# Patient Record
Sex: FEMALE | Race: WHITE | Employment: OTHER | URBAN - METROPOLITAN AREA
[De-identification: names, ages, dates, MRNs, and addresses within clinical notes are randomized per-mention and may not be internally consistent; named-entity substitution may affect disease eponyms.]

---

## 2017-02-02 RX ORDER — CLOPIDOGREL BISULFATE 75 MG/1
TABLET ORAL
Qty: 60 TAB | Refills: 0 | Status: SHIPPED | OUTPATIENT
Start: 2017-02-02 | End: 2017-04-01 | Stop reason: SDUPTHER

## 2017-03-02 RX ORDER — ISOSORBIDE MONONITRATE 60 MG/1
TABLET, EXTENDED RELEASE ORAL
Qty: 60 TAB | Refills: 0 | Status: SHIPPED | OUTPATIENT
Start: 2017-03-02 | End: 2017-05-08 | Stop reason: SDUPTHER

## 2017-03-06 ENCOUNTER — OFFICE VISIT (OUTPATIENT)
Dept: CARDIOLOGY CLINIC | Age: 67
End: 2017-03-06

## 2017-03-06 VITALS
BODY MASS INDEX: 24.17 KG/M2 | HEIGHT: 67 IN | HEART RATE: 74 BPM | WEIGHT: 154 LBS | SYSTOLIC BLOOD PRESSURE: 132 MMHG | RESPIRATION RATE: 16 BRPM | OXYGEN SATURATION: 95 % | DIASTOLIC BLOOD PRESSURE: 72 MMHG

## 2017-03-06 DIAGNOSIS — I25.119 ATHEROSCLEROSIS OF NATIVE CORONARY ARTERY OF NATIVE HEART WITH ANGINA PECTORIS (HCC): Primary | ICD-10-CM

## 2017-03-06 DIAGNOSIS — E10.8 TYPE 1 DIABETES MELLITUS WITH COMPLICATION (HCC): ICD-10-CM

## 2017-03-06 DIAGNOSIS — E03.9 HYPOTHYROIDISM, UNSPECIFIED TYPE: Chronic | ICD-10-CM

## 2017-03-06 DIAGNOSIS — Z95.5 S/P CORONARY ARTERY STENT PLACEMENT: ICD-10-CM

## 2017-03-06 DIAGNOSIS — Z98.62 PERIPHERAL VASCULAR ANGIOPLASTY STATUS: ICD-10-CM

## 2017-03-06 DIAGNOSIS — I10 ESSENTIAL HYPERTENSION: Chronic | ICD-10-CM

## 2017-03-06 DIAGNOSIS — I73.9 PAD (PERIPHERAL ARTERY DISEASE) (HCC): ICD-10-CM

## 2017-03-06 DIAGNOSIS — E78.2 MIXED HYPERLIPIDEMIA: Chronic | ICD-10-CM

## 2017-03-06 RX ORDER — NITROGLYCERIN 0.4 MG/1
0.4 TABLET SUBLINGUAL
Qty: 25 TAB | Refills: 1 | Status: SHIPPED | OUTPATIENT
Start: 2017-03-06

## 2017-03-06 RX ORDER — LOVASTATIN 20 MG/1
20 TABLET ORAL
COMMUNITY
End: 2019-04-08 | Stop reason: ALTCHOICE

## 2017-03-06 NOTE — MR AVS SNAPSHOT
Visit Information Date & Time Provider Department Dept. Phone Encounter #  
 3/6/2017 10:00 AM Di Jain, [address] [facility] [phone] [id number] Follow-up Instructions Return in about 6 months (around 9/6/2017). Upcoming Health Maintenance Date Due Hepatitis C Screening 1950 FOOT EXAM Q1 7/29/1961 EYE EXAM RETINAL OR DILATED Q1 7/29/1961 DTaP/Tdap/Td series (1 - Tdap) 7/29/1972 BREAST CANCER SCRN MAMMOGRAM 7/29/2001 FOBT Q 1 YEAR AGE 50-75 7/29/2001 MICROALBUMIN Q1 6/2/2010 ZOSTER VACCINE AGE 60> 7/29/2011 GLAUCOMA SCREENING Q2Y 7/29/2016 OSTEOPOROSIS SCREENING (DEXA) 7/29/2016 Pneumococcal 65+ Low/Medium Risk (1 of 2 - PCV13) 7/29/2016 MEDICARE YEARLY EXAM 7/29/2016 INFLUENZA AGE 9 TO ADULT 8/1/2016 HEMOGLOBIN A1C Q6M 8/29/2016 LIPID PANEL Q1 2/28/2017 Allergies as of 3/6/2017  Review Complete On: 3/6/2017 By: Jefferson Stewart NP Severity Noted Reaction Type Reactions Pcn [Penicillins] High 05/06/2010   Systemic Anaphylaxis Codeine  05/06/2010    Palpitations Ivp Dye [Fd And C Blue No.1]  05/06/2010    Nausea and Vomiting \"I was told [place] that it would stop my kidney up if I got it again. \" Current Immunizations  Reviewed on 6/11/2014 Name Date Pneumococcal Vaccine (Unspecified Type)  Deferred (Patient Refused) Not reviewed this visit You Were Diagnosed With   
  
 Codes Comments Atherosclerosis of native coronary artery of native heart with angina pectoris ([facility]    -  Primary ICD-10-CM: I25.119 ICD-9-CM: 414.01, 413.9 PAD (peripheral artery disease) (HCC)     ICD-10-CM: I73.9 ICD-9-CM: 443.9 Essential hypertension     ICD-10-CM: I10 
ICD-9-CM: 401.9 Mixed hyperlipidemia     ICD-10-CM: E78.2 ICD-9-CM: 272.2 Hypothyroidism, unspecified type     ICD-10-CM: E03.9 ICD-9-CM: 244.9 S/P coronary artery stent placement     ICD-10-CM: Z95.5 ICD-9-CM: V45.82 Peripheral vascular angioplasty status     ICD-10-CM: Z98.62 
ICD-9-CM: V45.89 Type 1 diabetes mellitus with complication (HCC)     RLY-78-AW: E10.8 ICD-9-CM: 250.91 Vitals BP Pulse Resp Height(growth percentile) Weight(growth percentile) SpO2  
 132/72 (BP 1 Location: Left arm, BP Patient Position: Sitting) 74 16 5' 7\" (1.702 m) 154 lb (69.9 kg) 95% BMI OB Status Smoking Status 24.12 kg/m2 Hysterectomy Former Smoker Vitals History BMI and BSA Data Body Mass Index Body Surface Area  
 24.12 kg/m 2 1.82 m 2 Preferred Pharmacy Pharmacy Name Phone [facility] [address], [address] [place] [address] Avenue [phone] Your Updated Medication List  
  
   
This list is accurate as of: 3/6/17 10:28 AM.  Always use your most recent med list.  
  
  
  
  
 aspirin 325 mg tablet Commonly known as:  ASPIRIN Take 1 Tab by mouth daily. calcium 500 mg Tab Take 1 Tab by mouth daily. CINNAMON PO Take 1,000 mg by mouth daily. clopidogrel 75 mg Tab Commonly known as:  PLAVIX TAKE ONE TABLET BY MOUTH ONCE DAILY HUMALOG SC  
6 Units by SubCUTAneous route three (3) times daily (with meals). * insulin  unit/mL (3 mL) Inpn Commonly known as:  HUMULIN N  
12 Units by SubCUTAneous route daily (with breakfast). * insulin  unit/mL (3 mL) Inpn Commonly known as:  HUMULIN N  
6 Units by SubCUTAneous route as needed (as needed with dinner). isosorbide mononitrate ER 60 mg CR tablet Commonly known as:  IMDUR  
TAKE ONE TABLET BY MOUTH ONCE DAILY  
  
 levothyroxine 50 mcg tablet Commonly known as:  SYNTHROID Take 50 mcg by mouth daily (before breakfast). lisinopril 40 mg tablet Commonly known as:  Corey Damon Take 1 tablet by mouth daily. lovastatin 20 mg tablet Commonly known as:  MEVACOR Take 20 mg by mouth nightly. magnesium oxide 400 mg tablet Commonly known as:  MAG-OX Take 400 mg by mouth daily. metoprolol tartrate 25 mg tablet Commonly known as:  LOPRESSOR  
TAKE ONE-HALF TABLET BY MOUTH TWICE DAILY  
  
 nitroglycerin 0.4 mg SL tablet Commonly known as:  NITROSTAT  
1 Tab by SubLINGual route every five (5) minutes as needed for Chest Pain. oxybutynin 5 mg tablet Commonly known as:  BLQKTNKF Take 5 mg by mouth daily. VITAMIN D3 4,000 unit Cap Generic drug:  cholecalciferol (vitamin D3) Take  by mouth daily. * Notice: This list has 2 medication(s) that are the same as other medications prescribed for you. Read the directions carefully, and ask your doctor or other care provider to review them with you. Prescriptions Sent to Pharmacy Refills  
 nitroglycerin (NITROSTAT) 0.4 mg SL tablet 1 Si Tab by SubLINGual route every five (5) minutes as needed for Chest Pain. Class: Normal  
 Pharmacy: [address] [place], [address] [place] [address] Ph #: [phone] Route: SubLINGual  
  
We Performed the Following AMB POC EKG ROUTINE W/ 12 LEADS, INTER & REP [62902 CPT(R)] Follow-up Instructions Return in about 6 months (around 2017). Introducing \[facility]\"" & [facility]! Dear Ivory Meier: Thank you for requesting a P[employer] account. Our records indicate that you already have an active Paxera account. You can access your account anytime at [email or web address] Did you know that you can access your hospital and ER discharge instructions at any time in Paxera? You can also review all of your test results from your hospital stay or ER visit. Additional Information If you have questions, please visit the Frequently Asked Questions section of the P[employer]era website at [email or web address] Remember, Paxera is NOT to be used for urgent needs. For medical emergencies, dial 911. Now available from your iPhone and Android! Please provide this summary of care documentation to your next provider. Your primary care clinician is listed as Aixa Shaw. If you have any questions after today's visit, please call [phone].

## 2017-03-06 NOTE — PROGRESS NOTES
Subjective/HPI:     Brandy Mcrae is a 73 y.o. female is here for f/u appt. She reports she went to [facility] this December and was able to walk around the park without having to stop d/t leg pain. Very pleased. She is having during stressful situations some discomfort in her chest, feels like she needs to belch but doesn't. She has not noticed it at other times. Denies similarity to symptoms prior to her  procedure. The patient denies shortness of breath, orthopnea, PND, LE edema, palpitations, syncope, presyncope or fatigue. PCP Provider  Francisco Willis MD  Past Medical History:   Diagnosis Date    Arthritis     BILAT HANDS    CAD (coronary artery disease)     high cholesterol; heart murmur    Cancer (HCC)     uterine    Chronic kidney disease     Diabetes ([facility]     Endocrine disease     hypothyroidism    Hypertension     Neurological disorder     TIA    Other ill-defined conditions(799.89)     PVD; benign right breast tumor removed    Peripheral vascular angioplasty status 8/14/2013 8/14/13 s/p angioplasty L RFA    Stroke ([facility]     TIA  X3 NO RESIDUAL    Thyroid disease     HYPOTHYROID      Past Surgical History:   Procedure Laterality Date    BREAST SURGERY PROCEDURE UNLISTED      tumor removed    CARDIAC SURG PROCEDURE UNLIST      CARDIAC STENT  X2    HX ANKLE FRACTURE TX      pins and plates right ankle    HX GYN      hysterectomy    HX OTHER SURGICAL      fem pop bypass on left     Allergies   Allergen Reactions    Pcn [Penicillins] Anaphylaxis    Codeine Palpitations    Ivp Dye [Fd And C Blue No.1] Nausea and Vomiting     \"I was told in [place] that it would stop my kidney up if I got it again. \"      Family History   Problem Relation Age of Onset   [facility] Mother     Hypertension Sister     Hypertension Sister     Heart Disease Brother 72    Stroke Sister      x2 sisters    Diabetes Father     Cancer Father       Current Outpatient Prescriptions Medication Sig    lovastatin (MEVACOR) 20 mg tablet Take 20 mg by mouth nightly.  isosorbide mononitrate ER (IMDUR) 60 mg CR tablet TAKE ONE TABLET BY MOUTH ONCE DAILY    clopidogrel (PLAVIX) 75 mg tab TAKE ONE TABLET BY MOUTH ONCE DAILY    metoprolol tartrate (LOPRESSOR) 25 mg tablet TAKE ONE-HALF TABLET BY MOUTH TWICE DAILY    magnesium oxide (MAG-OX) 400 mg tablet Take 400 mg by mouth daily.  calcium 500 mg tab Take 1 Tab by mouth daily.  nitroglycerin (NITROSTAT) 0.4 mg SL tablet 1 Tab by SubLINGual route every five (5) minutes as needed for Chest Pain.  oxybutynin (DITROPAN) 5 mg tablet Take 5 mg by mouth daily.  lisinopril (PRINIVIL, ZESTRIL) 40 mg tablet Take 1 tablet by mouth daily. (Patient taking differently: Take 25 mg by mouth daily.)    insulin NPH (HUMULIN N) 100 unit/mL (3 mL) pen 12 Units by SubCUTAneous route daily (with breakfast).  insulin NPH (HUMULIN N) 100 unit/mL (3 mL) pen 6 Units by SubCUTAneous route as needed (as needed with dinner).  cholecalciferol, vitamin D3, (VITAMIN D3) 4,000 unit cap Take  by mouth daily.  aspirin (ASPIRIN) 325 mg tablet Take 1 Tab by mouth daily.  levothyroxine (SYNTHROID) 50 mcg tablet Take 50 mcg by mouth daily (before breakfast).  INSULIN LISPRO (HUMALOG SC) 6 Units by SubCUTAneous route three (3) times daily (with meals).  CINNAMON BARK (CINNAMON PO) Take 1,000 mg by mouth daily. No current facility-administered medications for this visit.        Vitals:    03/06/17 0956 03/06/17 1008   BP: 130/74 132/72   Pulse: 74    Resp: 16    SpO2: 95%    Weight: 154 lb (69.9 kg)    Height: 5' 7\" (1.702 m)      Social History     Social History    Marital status:      Spouse name: N/A    Number of children: 11    Years of education: N/A     Occupational History     Not Employed     Social History Main Topics    Smoking status: Former Smoker     Packs/day: 5.00     Years: 30.00     Quit date: 9/1/2000    Smokeless tobacco: Never Used    Alcohol use No    Drug use: No    Sexual activity: Not on file     Other Topics Concern    Not on file     Social History Narrative       I have reviewed the nurses notes, vitals, problem list, allergy list, medical history, family, social history and medications. Review of Symptoms:    General: Pt denies excessive weight gain or loss. Pt is able to conduct ADL's  HEENT: Denies blurred vision, headaches, epistaxis and difficulty swallowing. Respiratory: Denies shortness of breath, SAHNI, wheezing or stridor. Cardiovascular: + precordial pain, denies palpitations, edema or PND  Gastrointestinal: Denies poor appetite, indigestion, abdominal pain or blood in stool  Urinary: Denies dysuria, pyuria  Musculoskeletal: Denies pain or swelling from muscles or joints  Neurologic: Denies tremor, paresthesias, or sensory motor disturbance  Skin: Denies rash, itching or texture change. Psych: Denies depression        Physical Exam:      General: Well developed, in no acute distress, cooperative and alert  HEENT: +left carotid bruits, no JVD, trach is midline. Neck Supple, PEERL, EOM intact. Heart:  Normal S1/S2 negative S3 or S4. Regular, no murmur, gallop or rub.   Respiratory: Clear bilaterally x 4, no wheezing or rales  Abdomen:   Soft, non-tender, no masses, bowel sounds are active.   Extremities:  No edema, normal cap refill, no cyanosis, atraumatic. Neuro: A&Ox3, speech clear, gait stable. Skin: Skin color is normal. No rashes or lesions. Non diaphoretic  Vascular: weak pulse in feet.      Cardiographics    ECG: SR    Results for orders placed or performed during the hospital encounter of 09/19/16   EKG, 12 LEAD, INITIAL   Result Value Ref Range    Ventricular Rate 59 BPM    Atrial Rate 59 BPM    P-R Interval 186 ms    QRS Duration 98 ms    Q-T Interval 436 ms    QTC Calculation (Bezet) 431 ms    Calculated P Axis 72 degrees    Calculated R Axis 44 degrees    Calculated T Axis 70 degrees Diagnosis       Sinus bradycardia  Minor nonspecific st&t changes , new  When compared with ECG of 19-SEP-2016 11:05,    Confirmed by Gurpreet Stevenson MD, Sonam Ghosh ([id number]) on 9/20/2016 4:48:21 PM           Cardiology Labs:  Lab Results   Component Value Date/Time    Cholesterol, total 172 02/29/2016 09:32 AM    HDL Cholesterol 74 02/29/2016 09:32 AM    LDL, calculated 83 02/29/2016 09:32 AM    Triglyceride 76 02/29/2016 09:32 AM    CHOL/HDL Ratio 1.9 09/07/2010 10:21 AM       Lab Results   Component Value Date/Time    Sodium 138 09/20/2016 04:41 AM    Potassium 4.0 09/20/2016 04:41 AM    Chloride 104 09/20/2016 04:41 AM    CO2 26 09/20/2016 04:41 AM    Anion gap 8 09/20/2016 04:41 AM    Glucose 216 09/20/2016 04:41 AM    BUN 21 09/20/2016 04:41 AM    Creatinine 1.06 09/20/2016 04:41 AM    BUN/Creatinine ratio 20 09/20/2016 04:41 AM    GFR est AA >60 09/20/2016 04:41 AM    GFR est non-AA 52 09/20/2016 04:41 AM    Calcium 8.5 09/20/2016 04:41 AM    AST (SGOT) 19 09/13/2016 09:52 AM    Alk. phosphatase 70 09/13/2016 09:52 AM    Protein, total 6.4 09/13/2016 09:52 AM    Albumin 4.1 09/13/2016 09:52 AM    Globulin 3.5 08/14/2013 08:44 AM    A-G Ratio 1.8 09/13/2016 09:52 AM    ALT (SGPT) 9 09/13/2016 09:52 AM           Assessment:     Assessment:     Carie Clayton was seen today for other. Diagnoses and all orders for this visit:    Atherosclerosis of native coronary artery of native heart with angina pectoris (HCC)    PAD (peripheral artery disease) ([facility]    Essential hypertension  -     AMB POC EKG ROUTINE W/ 12 LEADS, INTER & REP    Mixed hyperlipidemia    Hypothyroidism, unspecified type    S/P coronary artery stent placement    Peripheral vascular angioplasty status    Type 1 diabetes mellitus with complication ([facility]        ICD-10-CM ICD-9-CM    1. Atherosclerosis of native coronary artery of native heart with angina pectoris ([facility] I25.119 414.01      413.9    2. PAD (peripheral artery disease) (HCC) I73.9 443.9    3.  Essential hypertension I10 401.9 AMB POC EKG ROUTINE W/ 12 LEADS, INTER & REP   4. Mixed hyperlipidemia E78.2 272.2    5. Hypothyroidism, unspecified type E03.9 244.9    6. S/P coronary artery stent placement Z95.5 V45.82    7. Peripheral vascular angioplasty status Z98.62 V45.89    8. Type 1 diabetes mellitus with complication (HCC) B76.5 250.91      Orders Placed This Encounter    AMB POC EKG ROUTINE W/ 12 LEADS, INTER & REP     Order Specific Question:   Reason for Exam:     Answer:   routine    lovastatin (MEVACOR) 20 mg tablet     Sig: Take 20 mg by mouth nightly. Plan:     CAD s/p LCX ARCHANA 1/2012. S/p re intervention 10/2012. S/p RCA  PCI 09/2016. Pt reporting atypical chest discomfort during stress only, denies similarity to symptoms prior to stent placement. Should intensity change or being to feel similar to previous symptoms call to discuss.       ATHEROSCLEROSIS OF NATIVE ARTERIES OF THE EXTREMITIES WITH INTERMITTENT CLAUDICATION    S/p b/l iliac stents 7/2014.  s/p left SFA laser athetectomy and stent 9/2011, s/p left SFA angioplasty for ISR 1/2012. Significant stenosis Rt. SFA.- S/p left SFA redo laser atherectomy and PTA 10/2012. S/p left SFA angioplasty 8/2013- Cinically asymptomatic. No c/o brain  LE claudication. Continue current tx      HYPERLIPIDEMIA, MIXED    On statin. F/u with Endocrinology.        BENIGN ESSENTIAL HYPERTENSION    Controlled. Carotid bruit- asymptomatic. Carotid dopplers in 2011 without significant disease    Brandt Poster, NP       Pt seen and examined in details. Agree with NP A&P. D/w pt.      Nat Lopez MD

## 2017-03-06 NOTE — PROGRESS NOTES
Chief Complaint   Patient presents with    Other     6 month-chest pain, denies any cardiac symptoms

## 2017-04-03 RX ORDER — CLOPIDOGREL BISULFATE 75 MG/1
TABLET ORAL
Qty: 60 TAB | Refills: 0 | Status: SHIPPED | OUTPATIENT
Start: 2017-04-03 | End: 2017-06-05 | Stop reason: SDUPTHER

## 2017-04-13 ENCOUNTER — TELEPHONE (OUTPATIENT)
Dept: CARDIOLOGY CLINIC | Age: 67
End: 2017-04-13

## 2017-04-13 NOTE — TELEPHONE ENCOUNTER
Pt 's  left message on phone wanting to know if it is okay for pt to take the diet pill Hydroxycut black. Please advise, thank you! Left message to call me back. Left 2nd message to call me back. Left 3rd message to call me back. CALLED FOR 4TH TIME WITH NO ANSWER. DID NOT LEAVE A MESSAGE. WILL CLOSE OUT CHART. IF PT CONTACTS ME WILL TELL HER AT THAT TIME IT IS NOT ADVISED THAT SHE TAKE HYDROXYCUT.

## 2017-04-20 ENCOUNTER — TELEPHONE (OUTPATIENT)
Dept: CARDIOLOGY CLINIC | Age: 67
End: 2017-04-20

## 2017-04-20 NOTE — TELEPHONE ENCOUNTER
Pt returned my call,verified pt with two pt identifiers, told pt it is it is  advised that she not take Hydroxycut given her Hx of heart issues. Advised that she diet and exercise naturally. She verbalized that she understood everything.

## 2017-05-08 RX ORDER — ISOSORBIDE MONONITRATE 60 MG/1
TABLET, EXTENDED RELEASE ORAL
Qty: 60 TAB | Refills: 0 | Status: SHIPPED | OUTPATIENT
Start: 2017-05-08 | End: 2017-07-04 | Stop reason: SDUPTHER

## 2017-06-05 RX ORDER — CLOPIDOGREL BISULFATE 75 MG/1
TABLET ORAL
Qty: 60 TAB | Refills: 0 | Status: SHIPPED | OUTPATIENT
Start: 2017-06-05 | End: 2017-07-17 | Stop reason: SDUPTHER

## 2017-07-05 RX ORDER — ISOSORBIDE MONONITRATE 60 MG/1
TABLET, EXTENDED RELEASE ORAL
Qty: 60 TAB | Refills: 0 | Status: SHIPPED | OUTPATIENT
Start: 2017-07-05 | End: 2017-09-05 | Stop reason: SDUPTHER

## 2017-07-17 RX ORDER — CLOPIDOGREL BISULFATE 75 MG/1
TABLET ORAL
Qty: 60 TAB | Refills: 0 | Status: SHIPPED | OUTPATIENT
Start: 2017-07-17 | End: 2017-09-30 | Stop reason: SDUPTHER

## 2017-09-05 ENCOUNTER — OFFICE VISIT (OUTPATIENT)
Dept: CARDIOLOGY CLINIC | Age: 67
End: 2017-09-05

## 2017-09-05 VITALS
HEART RATE: 64 BPM | SYSTOLIC BLOOD PRESSURE: 150 MMHG | OXYGEN SATURATION: 95 % | RESPIRATION RATE: 16 BRPM | BODY MASS INDEX: 23.89 KG/M2 | DIASTOLIC BLOOD PRESSURE: 70 MMHG | WEIGHT: 152.2 LBS | HEIGHT: 67 IN

## 2017-09-05 DIAGNOSIS — E78.2 MIXED HYPERLIPIDEMIA: Chronic | ICD-10-CM

## 2017-09-05 DIAGNOSIS — I10 ESSENTIAL HYPERTENSION: Chronic | ICD-10-CM

## 2017-09-05 DIAGNOSIS — Z98.62 PERIPHERAL VASCULAR ANGIOPLASTY STATUS: ICD-10-CM

## 2017-09-05 DIAGNOSIS — I73.9 PAD (PERIPHERAL ARTERY DISEASE) (HCC): Primary | ICD-10-CM

## 2017-09-05 DIAGNOSIS — I25.10 ATHEROSCLEROSIS OF NATIVE CORONARY ARTERY OF NATIVE HEART WITHOUT ANGINA PECTORIS: ICD-10-CM

## 2017-09-05 DIAGNOSIS — I70.213 ATHEROSCLEROSIS OF NATIVE ARTERY OF BOTH LOWER EXTREMITIES WITH INTERMITTENT CLAUDICATION (HCC): ICD-10-CM

## 2017-09-05 RX ORDER — ISOSORBIDE MONONITRATE 60 MG/1
TABLET, EXTENDED RELEASE ORAL
Qty: 60 TAB | Refills: 3 | Status: SHIPPED | OUTPATIENT
Start: 2017-09-05 | End: 2019-10-08 | Stop reason: SDUPTHER

## 2017-09-05 NOTE — MR AVS SNAPSHOT
Visit Information Date & Time Provider Department Dept. Phone Encounter #  
 9/5/2017  9:45 AM Jessica Bledsoe, [address] [facility] [phone] [id number] Follow-up Instructions Return in about 6 months (around 3/5/2018). Follow-up and Disposition History Upcoming Health Maintenance Date Due Hepatitis C Screening 1950 FOOT EXAM Q1 7/29/1961 EYE EXAM RETINAL OR DILATED Q1 7/29/1961 DTaP/Tdap/Td series (1 - Tdap) 7/29/1972 BREAST CANCER SCRN MAMMOGRAM 7/29/2001 FOBT Q 1 YEAR AGE 50-75 7/29/2001 MICROALBUMIN Q1 6/2/2010 ZOSTER VACCINE AGE 60> 5/29/2011 GLAUCOMA SCREENING Q2Y 7/29/2016 OSTEOPOROSIS SCREENING (DEXA) 7/29/2016 Pneumococcal 65+ Low/Medium Risk (1 of 2 - PCV13) 7/29/2016 MEDICARE YEARLY EXAM 7/29/2016 HEMOGLOBIN A1C Q6M 8/29/2016 LIPID PANEL Q1 2/28/2017 INFLUENZA AGE 9 TO ADULT 8/1/2017 Allergies as of 9/5/2017  Review Complete On: 9/5/2017 By: Jessica Bledsoe MD  
  
 Severity Noted Reaction Type Reactions Pcn [Penicillins] High 05/06/2010   Systemic Anaphylaxis Codeine  05/06/2010    Palpitations Ivp Dye [Fd And C Blue No.1]  05/06/2010    Nausea and Vomiting \"I was told [place] that it would stop my kidney up if I got it again. \" Current Immunizations  Reviewed on 6/11/2014 Name Date ZZZ-RETIRED (DO NOT USE) Pneumococcal Vaccine (Unspecified Type)  Deferred (Patient Refused) Not reviewed this visit You Were Diagnosed With   
  
 Codes Comments PAD (peripheral artery disease) (HCC)    -  Primary ICD-10-CM: I73.9 ICD-9-CM: 443.9 Essential hypertension     ICD-10-CM: I10 
ICD-9-CM: 401.9 Atherosclerosis of native artery of both lower extremities with intermittent claudication ([facility]     ICD-10-CM: P86.901 ICD-9-CM: 440.21 Mixed hyperlipidemia     ICD-10-CM: E78.2 ICD-9-CM: 272.2 Peripheral vascular angioplasty status     ICD-10-CM: Z98.62 
ICD-9-CM: V45.89 Atherosclerosis of native coronary artery of native heart without angina pectoris     ICD-10-CM: I25.10 ICD-9-CM: 414.01 Vitals BP Pulse Resp Height(growth percentile) Weight(growth percentile) SpO2  
 150/70 (BP Patient Position: Sitting) 64 16 5' 7\" (1.702 m) 152 lb 3.2 oz (69 kg) 95% BMI OB Status Smoking Status 23.84 kg/m2 Hysterectomy Former Smoker BMI and BSA Data Body Mass Index Body Surface Area  
 23.84 kg/m 2 1.81 m 2 Preferred Pharmacy Pharmacy Name Phone [facility] [address] [phone] Your Updated Medication List  
  
   
This list is accurate as of: 9/5/17 10:24 AM.  Always use your most recent med list.  
  
  
  
  
 calcium 500 mg Tab Take 1 Tab by mouth daily. CINNAMON PO Take 1,000 mg by mouth daily. clopidogrel 75 mg Tab Commonly known as:  PLAVIX TAKE ONE TABLET BY MOUTH ONCE DAILY HUMALOG SC  
6 Units by SubCUTAneous route three (3) times daily (with meals). insulin  unit/mL (3 mL) Inpn Commonly known as:  HUMULIN N  
by SubCUTAneous route as needed (as needed with dinner). Sliding scale  
  
 isosorbide mononitrate ER 60 mg CR tablet Commonly known as:  IMDUR  
TAKE ONE TABLET BY MOUTH ONCE DAILY  
  
 levothyroxine 50 mcg tablet Commonly known as:  SYNTHROID Take 50 mcg by mouth daily (before breakfast). lisinopril 40 mg tablet Commonly known as:  Karn Desanctis Take 1 tablet by mouth daily. lovastatin 20 mg tablet Commonly known as:  MEVACOR Take 20 mg by mouth nightly.  
  
 magnesium oxide 400 mg tablet Commonly known as:  MAG-OX Take 400 mg by mouth daily. metoprolol tartrate 25 mg tablet Commonly known as:  LOPRESSOR  
TAKE ONE-HALF TABLET BY MOUTH TWICE DAILY  
  
 nitroglycerin 0.4 mg SL tablet Commonly known as:  NITROSTAT  
1 Tab by SubLINGual route every five (5) minutes as needed for Chest Pain. oxybutynin 5 mg tablet Commonly known as:  HVFIWATW Take 5 mg by mouth daily. VITAMIN D3 4,000 unit Cap Generic drug:  cholecalciferol (vitamin D3) Take  by mouth daily. Prescriptions Sent to Pharmacy Refills  
 isosorbide mononitrate ER (IMDUR) 60 mg CR tablet 3 Sig: TAKE ONE TABLET BY MOUTH ONCE DAILY Class: Normal  
 Pharmacy: [address] #: [phone] We Performed the Following AMB POC EKG ROUTINE W/ 12 LEADS, INTER & REP [88323 CPT(R)] Follow-up Instructions Return in about 6 months (around 3/5/2018). To-Do List   
 09/05/2017 Imaging:  ANKLE BRACHIAL INDEX Introducing [facility] SERVICES! Dear Romy Escoto: Thank you for requesting a [employer]Â­ account. Our records indicate that you already have an active [employer]Â­ account. You can access your account anytime at [email or web address] Did you know that you can access your hospital and ER discharge instructions at any time in [employer]? You can also review all of your test results from your hospital stay or ER visit. Additional Information If you have questions, please visit the Frequently Asked Questions section of the [employer] website at [email or web address] Remember, [employer] is NOT to be used for urgent needs. For medical emergencies, dial 911. Now available from your iPhone and Android! Please provide this summary of care documentation to your next provider. Your primary care clinician is listed as Vijaya Be. If you have any questions after today's visit, please call [phone].

## 2017-09-05 NOTE — PROGRESS NOTES
9/5/2017 10:11 AM      Subjective:     Omar Suárez is here for f/u visit. Main complaint is right LE claudication after walking < 1 block. She denies chest pain, chest pressure/discomfort, dyspnea, palpitations, irregular heart beats, near-syncope, syncope, fatigue, orthopnea, paroxysmal nocturnal dyspnea, exertional chest pressure/discomfort, lower extremity edema. Visit Vitals    /70 (BP Patient Position: Sitting)  Comment: lt/rt/lg    Pulse 64    Resp 16    Ht 5' 7\" (1.702 m)    Wt 152 lb 3.2 oz (69 kg)    SpO2 95%    BMI 23.84 kg/m2     Current Outpatient Prescriptions   Medication Sig    isosorbide mononitrate ER (IMDUR) 60 mg CR tablet TAKE ONE TABLET BY MOUTH ONCE DAILY    clopidogrel (PLAVIX) 75 mg tab TAKE ONE TABLET BY MOUTH ONCE DAILY    lovastatin (MEVACOR) 20 mg tablet Take 20 mg by mouth nightly.  nitroglycerin (NITROSTAT) 0.4 mg SL tablet 1 Tab by SubLINGual route every five (5) minutes as needed for Chest Pain.  metoprolol tartrate (LOPRESSOR) 25 mg tablet TAKE ONE-HALF TABLET BY MOUTH TWICE DAILY    magnesium oxide (MAG-OX) 400 mg tablet Take 400 mg by mouth daily.  calcium 500 mg tab Take 1 Tab by mouth daily.  oxybutynin (DITROPAN) 5 mg tablet Take 5 mg by mouth daily.  lisinopril (PRINIVIL, ZESTRIL) 40 mg tablet Take 1 tablet by mouth daily. (Patient taking differently: Take 25 mg by mouth daily.)    insulin NPH (HUMULIN N) 100 unit/mL (3 mL) pen by SubCUTAneous route as needed (as needed with dinner). Sliding scale    cholecalciferol, vitamin D3, (VITAMIN D3) 4,000 unit cap Take  by mouth daily.  levothyroxine (SYNTHROID) 50 mcg tablet Take 50 mcg by mouth daily (before breakfast).  INSULIN LISPRO (HUMALOG SC) 6 Units by SubCUTAneous route three (3) times daily (with meals).  CINNAMON BARK (CINNAMON PO) Take 1,000 mg by mouth daily. No current facility-administered medications for this visit.           Objective: Visit Vitals    /70 (BP Patient Position: Sitting)    Pulse 64    Resp 16    Ht 5' 7\" (1.702 m)    Wt 152 lb 3.2 oz (69 kg)    SpO2 95%    BMI 23.84 kg/m2       Data Review:    EKG: Normal sinus rhythm, no acute st/t changes    Reviewed and/or ordered active problem list, medication list tests    Past Medical History:   Diagnosis Date    Arthritis     BILAT HANDS    CAD (coronary artery disease)     high cholesterol; heart murmur    Cancer (HCC)     uterine    Chronic kidney disease     Diabetes [facility] [address] [facility]     Endocrine disease     hypothyroidism    Hypertension     Neurological disorder     TIA    Other ill-defined conditions     PVD; benign right breast tumor removed    Peripheral vascular angioplasty status 8/14/2013 8/14/13 s/p angioplasty L RFA    Stroke ([facility] [address]     TIA  X3 NO RESIDUAL    Thyroid disease     HYPOTHYROID      Past Surgical History:   Procedure Laterality Date    BREAST SURGERY PROCEDURE UNLISTED      tumor removed    CARDIAC SURG PROCEDURE UNLIST      CARDIAC STENT  X2    HX ANKLE FRACTURE TX      pins and plates right ankle    HX GYN      hysterectomy    HX OTHER SURGICAL      fem pop bypass on left     Allergies   Allergen Reactions    Pcn [Penicillins] Anaphylaxis    Codeine Palpitations    Ivp Dye [Fd And C Blue No.1] Nausea and Vomiting     \"I was told [place] that it would stop my kidney up if I got it again. \"      Family History   Problem Relation Age of Onset    Cancer Mother     Hypertension Sister     Hypertension Sister     Heart Disease Brother 72    Stroke Sister      x2 sisters    Diabetes Father     Cancer Father       Social History     Social History    Marital status:      Spouse name: N/A    Number of children: 11    Years of education: N/A     Occupational History     Not Employed     Social History Main Topics    Smoking status: Former Smoker     Packs/day: 5.00     Years: 30.00     Quit date: 9/1/2000    Smokeless tobacco: Never Used    Alcohol use No    Drug use: No    Sexual activity: Not on file     Other Topics Concern    Not on file     Social History Narrative         Review of Systems     General: Not Present- Anorexia, Chills, Dietary Changes, Fatigue, Fever, Medication Changes, Night Sweats, Weight Gain > 10lbs. and Weight Loss > 10lbs. .  Skin: Not Present- Bruising and Excessive Sweating. HEENT: Not Present- Headache, Visual Loss and Vertigo. Respiratory: Not Present- Cough, Decreased Exercise Tolerance, Difficulty Breathing, Snoring and Wheezing. Cardiovascular: Not Present- Abnormal Blood Pressure, Chest Pain, Difficulty Breathing On Exertion, Edema, Fainting / Blacking Out, Irregular Heart Beat, Night Cramps, Orthopnea, Palpitations, Paroxysmal Nocturnal Dyspnea, Rapid Heart Rate, Shortness of Breath and Swelling of Extremities. Gastrointestinal: Not Present- Black, Tarry Stool, Bloody Stool, Diarrhea, Hematemesis, Rectal Bleeding and Vomiting. Musculoskeletal: Not Present- Muscle Pain and Muscle Weakness. Neurological: Not Present- Dizziness. Psychiatric: Not Present- Depression. Endocrine: Not Present- Cold Intolerance, Heat Intolerance and Thyroid Problems. Hematology: Not Present- Abnormal Bleeding, Anemia, Blood Clots and Easy Bruising.       Physical Exam   The physical exam findings are as follows:       General   Mental Status - Alert. General Appearance - Not in acute distress. Chest and Lung Exam   Inspection: Accessory muscles - No use of accessory muscles in breathing. Auscultation:   Breath sounds: - Normal.      Cardiovascular   Inspection: Jugular vein - Bilateral - Inspection Normal.  Palpation/Percussion:   Apical Impulse: - Normal.  Auscultation: Rhythm - Regular. Heart Sounds - S1 WNL and S2 WNL. No S3 or S4. Murmurs & Other Heart Sounds: Auscultation of the heart reveals - No Murmurs. Carotid arteries - No Carotid bruit.       Peripheral Vascular   Upper Extremity: Inspection - Bilateral - No Cyanotic nailbeds or Digital clubbing. Lower Extremity:   Palpation: Dorsalis pedis pulse - Bilateral - weak. Posterior tibia pulse - Bilateral - weak. Edema - Bilateral - No edema. Assessment:       ICD-10-CM ICD-9-CM    1. PAD (peripheral artery disease) ([facility]) I73.9 443. 9 ANKLE BRACHIAL INDEX   2. Essential hypertension I10 401.9 AMB POC EKG ROUTINE W/ 12 LEADS, INTER & REP      ANKLE BRACHIAL INDEX   3. Atherosclerosis of native artery of both lower extremities with intermittent claudication ([facility]) I70.213 440.21 ANKLE BRACHIAL INDEX   4. Mixed hyperlipidemia E78.2 272.2    5. Peripheral vascular angioplasty status Z98.62 V45.89 ANKLE BRACHIAL INDEX   6. Atherosclerosis of native coronary artery of native heart without angina pectoris I25.10 414.01        Plan:     CAD s/p LCX ARCHANA 1/2012. S/p re intervention 10/2012. S/p RCA  PCI 09/2016. Stable. Can stop DAPT. Due to h/o PAD will keep her on plavix. Nose bleed stopped after stopping aspirin.         ATHEROSCLEROSIS OF NATIVE ARTERIES OF THE EXTREMITIES WITH INTERMITTENT CLAUDICATION    S/p b/l iliac stents 7/2014.  s/p left SFA laser athetectomy and stent 9/2011, s/p left SFA angioplasty for ISR 1/2012. Significant stenosis Rt. SFA.- S/p left SFA redo laser atherectomy and PTA 10/2012. S/p left SFA angioplasty 8/2013    Now having right LE claudication. Check VENECIA.       HYPERLIPIDEMIA, MIXED    On statin.        BENIGN ESSENTIAL HYPERTENSION    Controlled.      Carotid bruit- asymptomatic.  Carotid dopplers in 2011 without significant disease

## 2017-09-05 NOTE — PROGRESS NOTES
Patient C/O right leg pain and cramping relieved with rest.Patient has been taking  ASA every other day due to nose bleeds.

## 2017-09-08 RX ORDER — ISOSORBIDE MONONITRATE 60 MG/1
TABLET, EXTENDED RELEASE ORAL
Qty: 60 TAB | Refills: 0 | Status: SHIPPED | OUTPATIENT
Start: 2017-09-08 | End: 2017-10-24 | Stop reason: SDUPTHER

## 2017-09-18 ENCOUNTER — OFFICE VISIT (OUTPATIENT)
Dept: CARDIOLOGY CLINIC | Age: 67
End: 2017-09-18

## 2017-09-18 ENCOUNTER — TELEPHONE (OUTPATIENT)
Dept: CARDIOLOGY CLINIC | Age: 67
End: 2017-09-18

## 2017-09-18 DIAGNOSIS — I73.9 PAD (PERIPHERAL ARTERY DISEASE) (HCC): Primary | ICD-10-CM

## 2017-09-18 DIAGNOSIS — Z98.62 PERIPHERAL VASCULAR ANGIOPLASTY STATUS: ICD-10-CM

## 2017-09-18 NOTE — TELEPHONE ENCOUNTER
Verified patient with two identifiers. Pt informed of VENECIA study results. Arterial doppler scheduled per Dr Vincente Sicard.

## 2017-09-30 RX ORDER — CLOPIDOGREL BISULFATE 75 MG/1
TABLET ORAL
Qty: 60 TAB | Refills: 0 | Status: SHIPPED | OUTPATIENT
Start: 2017-09-30 | End: 2017-11-29 | Stop reason: SDUPTHER

## 2017-10-09 ENCOUNTER — CLINICAL SUPPORT (OUTPATIENT)
Dept: CARDIOLOGY CLINIC | Age: 67
End: 2017-10-09

## 2017-10-09 DIAGNOSIS — I10 ESSENTIAL HYPERTENSION: Primary | Chronic | ICD-10-CM

## 2017-10-09 DIAGNOSIS — I70.213 ATHEROSCLEROSIS OF NATIVE ARTERY OF BOTH LOWER EXTREMITIES WITH INTERMITTENT CLAUDICATION (HCC): ICD-10-CM

## 2017-10-09 DIAGNOSIS — Z98.62 PERIPHERAL VASCULAR ANGIOPLASTY STATUS: ICD-10-CM

## 2017-10-09 NOTE — PROCEDURES
[facility]  *** FINAL REPORT ***    Name: Nubia Méndez  MRN: [id number]       Outpatient  : 1950  HIS Order #: [id number]  [id number] [facility] Visit #: [id number]  Date: 09 Oct 2017    TYPE OF TEST: Extremity Arterial Duplex    REASON FOR TEST  Claudication (right side)                            Right                     Left  Artery               PSV   Finding             PSV   Finding  ------------------  -----  ---------------    -----  ---------------  External iliac:  Common femoral:     165.0  Mild stenosis      117.0  Mild stenosis  Profunda femoris:    79.0  Mild stenosis       89.0  Mild stenosis  Proximal SFA:       161.0                     238.0  Mid SFA:            320.0  >75% stenosis      129.0  >50% stenosis  Distal SFA:          83.0                      72.0  Popliteal:          109.0  Mild stenosis       97.0  Mild stenosis  Anterior tibial:     69.0  Mild stenosis       64.0  Mild stenosis  Posterior tibial:          Occluded                  Occluded    Pressures               Right     Left               -----     -----     Brachial:           DP:           PT:            VENECIA:            Toe: INTERPRETATION/FINDINGS  Right leg :  1. <50% stenosis of the common femoral, profunda femoris, popliteal  (ak), anterior tibial and peroneal arteries. 2. >75% stenosis of the superficial femoral artery. 3. Occlusion of the posterior tibial artery. 4. A monophasic signal is demonstrated in the anterior tibial and  peroneal arteries. Left leg :  1. <50% stenosis of the common femoral, profunda femoris, popliteal  (ak), anterior tibial and peroneal arteries. 2. >50% stenosis of the superficial femoral artery. 3. Occlusion of the posterior tibial artery. 4. A monophasic signal is demonstrated in the anterior tibial and  peroneal arteries. ADDITIONAL COMMENTS    I have personally reviewed the data relevant to the interpretation of  this  study.     TECHNOLOGIST: Catarino Holcomb ISAAC Garcia  Signed: 10/09/2017 03:33 PM    PHYSICIAN: Aby Hurtado.  Estela Murrell MD  Signed: 10/10/2017 05:07 PM

## 2017-10-24 ENCOUNTER — HOSPITAL ENCOUNTER (OUTPATIENT)
Dept: LAB | Age: 67
Discharge: HOME | End: 2017-10-24
Payer: MEDICARE

## 2017-10-24 ENCOUNTER — OFFICE VISIT (OUTPATIENT)
Dept: CARDIOLOGY CLINIC | Age: 67
End: 2017-10-24

## 2017-10-24 VITALS
WEIGHT: 150.9 LBS | OXYGEN SATURATION: 95 % | SYSTOLIC BLOOD PRESSURE: 112 MMHG | HEIGHT: 67 IN | DIASTOLIC BLOOD PRESSURE: 58 MMHG | BODY MASS INDEX: 23.69 KG/M2 | RESPIRATION RATE: 16 BRPM | HEART RATE: 80 BPM

## 2017-10-24 DIAGNOSIS — I73.9 PAD (PERIPHERAL ARTERY DISEASE) (HCC): Primary | ICD-10-CM

## 2017-10-24 DIAGNOSIS — Z95.5 S/P CORONARY ARTERY STENT PLACEMENT: ICD-10-CM

## 2017-10-24 DIAGNOSIS — E78.2 MIXED HYPERLIPIDEMIA: Chronic | ICD-10-CM

## 2017-10-24 DIAGNOSIS — I25.10 ATHEROSCLEROSIS OF NATIVE CORONARY ARTERY OF NATIVE HEART WITHOUT ANGINA PECTORIS: ICD-10-CM

## 2017-10-24 DIAGNOSIS — I10 ESSENTIAL HYPERTENSION: Chronic | ICD-10-CM

## 2017-10-24 DIAGNOSIS — I70.213 ATHEROSCLEROSIS OF NATIVE ARTERY OF BOTH LOWER EXTREMITIES WITH INTERMITTENT CLAUDICATION (HCC): ICD-10-CM

## 2017-10-24 PROCEDURE — 36415 COLL VENOUS BLD VENIPUNCTURE: CPT

## 2017-10-24 PROCEDURE — 85610 PROTHROMBIN TIME: CPT

## 2017-10-24 PROCEDURE — 85027 COMPLETE CBC AUTOMATED: CPT

## 2017-10-24 PROCEDURE — 80053 COMPREHEN METABOLIC PANEL: CPT

## 2017-10-24 NOTE — MR AVS SNAPSHOT
Visit Information Date & Time Provider Department Dept. Phone Encounter #  
 10/24/2017  2:45 PM Josemanuel Hartmann, [address] [place] [address] [facility] [phone] [id number] Your Appointments 3/6/2018  9:45 AM  
6 MONTH with Josemanuel Hartmann MD  
[facility] [address] [facility]) Appt Note: Dr. Evan Ballesteros [address]  
[phone] [address] [facility] [address] Upcoming Health Maintenance Date Due Hepatitis C Screening 1950 FOOT EXAM Q1 7/29/1961 EYE EXAM RETINAL OR DILATED Q1 7/29/1961 DTaP/Tdap/Td series (1 - Tdap) 7/29/1972 BREAST CANCER SCRN MAMMOGRAM 7/29/2001 FOBT Q 1 YEAR AGE 50-75 7/29/2001 MICROALBUMIN Q1 6/2/2010 ZOSTER VACCINE AGE 60> 5/29/2011 GLAUCOMA SCREENING Q2Y 7/29/2016 OSTEOPOROSIS SCREENING (DEXA) 7/29/2016 Pneumococcal 65+ Low/Medium Risk (1 of 2 - PCV13) 7/29/2016 MEDICARE YEARLY EXAM 7/29/2016 HEMOGLOBIN A1C Q6M 8/29/2016 LIPID PANEL Q1 2/28/2017 INFLUENZA AGE 9 TO ADULT 8/1/2017 Allergies as of 10/24/2017  Review Complete On: 10/24/2017 By: Josemanuel Hartmann MD  
  
 Severity Noted Reaction Type Reactions Pcn [Penicillins] High 05/06/2010   Systemic Anaphylaxis Codeine  05/06/2010    Palpitations Ivp Dye [Fd And C Blue No.1]  05/06/2010    Nausea and Vomiting \"I was told in [place] that it would stop my kidney up if I got it again. \" Current Immunizations  Reviewed on 6/11/2014 Name Date ZZZ-RETIRED (DO NOT USE) Pneumococcal Vaccine (Unspecified Type)  Deferred (Patient Refused) Not reviewed this visit You Were Diagnosed With   
  
 Codes Comments PAD (peripheral artery disease) (HCC)    -  Primary ICD-10-CM: I73.9 ICD-9-CM: 443.9 Atherosclerosis of native artery of both lower extremities with intermittent claudication ([facility] [address]     ICD-10-CM: G91.419 ICD-9-CM: 440.21 S/P coronary artery stent placement     ICD-10-CM: Z95.5 ICD-9-CM: V45.82 Atherosclerosis of native coronary artery of native heart without angina pectoris     ICD-10-CM: I25.10 ICD-9-CM: 414.01 Mixed hyperlipidemia     ICD-10-CM: E78.2 ICD-9-CM: 272.2 Essential hypertension     ICD-10-CM: I10 
ICD-9-CM: 401.9 Vitals BP Pulse Resp Height(growth percentile) Weight(growth percentile) SpO2  
 112/58 (BP 1 Location: Left arm, BP Patient Position: Standing) 80 16 5' 7\" (1.702 m) 150 lb 14.4 oz (68.4 kg) 95% BMI OB Status Smoking Status 23.63 kg/m2 Hysterectomy Former Smoker Vitals History BMI and BSA Data Body Mass Index Body Surface Area  
 23.63 kg/m 2 1.8 m 2 Preferred Pharmacy Pharmacy Name Phone [facility] [address] [phone] Your Updated Medication List  
  
   
This list is accurate as of: 10/24/17  3:19 PM.  Always use your most recent med list.  
  
  
  
  
 calcium 500 mg Tab Take 1 Tab by mouth daily. CINNAMON PO Take 1,000 mg by mouth daily. clopidogrel 75 mg Tab Commonly known as:  PLAVIX TAKE ONE TABLET BY MOUTH ONCE DAILY HUMALOG SC  
6 Units by SubCUTAneous route three (3) times daily (with meals). insulin  unit/mL (3 mL) Inpn Commonly known as:  HUMULIN N  
by SubCUTAneous route as needed (as needed with dinner). Sliding scale  
  
 isosorbide mononitrate ER 60 mg CR tablet Commonly known as:  IMDUR  
TAKE ONE TABLET BY MOUTH ONCE DAILY  
  
 levothyroxine 50 mcg tablet Commonly known as:  SYNTHROID Take 50 mcg by mouth daily (before breakfast). lisinopril 40 mg tablet Commonly known as:  Samanta Maria Del Rosario Take 1 tablet by mouth daily. lovastatin 20 mg tablet Commonly known as:  MEVACOR Take 20 mg by mouth nightly.  
  
 magnesium oxide 400 mg tablet Commonly known as:  MAG-OX Take 400 mg by mouth daily. metoprolol tartrate 25 mg tablet Commonly known as:  LOPRESSOR  
TAKE ONE-HALF TABLET BY MOUTH TWICE DAILY  
  
 nitroglycerin 0.4 mg SL tablet Commonly known as:  NITROSTAT  
1 Tab by SubLINGual route every five (5) minutes as needed for Chest Pain. oxybutynin 5 mg tablet Commonly known as:  YMAIOAKU Take 5 mg by mouth daily. VITAMIN D3 4,000 unit Cap Generic drug:  cholecalciferol (vitamin D3) Take  by mouth daily. We Performed the Following CBC W/O DIFF [39355 CPT(R)] METABOLIC PANEL, COMPREHENSIVE [90400 CPT(R)] PROTHROMBIN TIME + INR [35486 CPT(R)] To-Do List   
 10/24/2017 Imaging:  IR ANGIO EXT LOWER BI [facility] SERVICES! Dear Yashira Mai: Thank you for requesting a AYLIEN account. Our records indicate that you already have an active AYLIEN account. You can access your account anytime at [email or web address] Did you know that you can access your hospital and ER discharge instructions at any time in [employer]YLIEN? You can also review all of your test results from your hospital stay or ER visit. Additional Information If you have questions, please visit the Frequently Asked Questions section of the AYLIEN website at [email or web address] Remember, AYLIEN is NOT to be used for urgent needs. For medical emergencies, dial 911. Now available from your iPhone and Android! Please provide this summary of care documentation to your next provider. Your primary care clinician is listed as Renato Olivares. If you have any questions after today's visit, please call [phone].

## 2017-10-24 NOTE — PROGRESS NOTES
10/24/2017 3:15 PM      Subjective:     Mery Figueroa is here for f/u. Continue to have limiting claudication of right LE. On non invasive evaluation significant stenosis noted. She denies chest pain, chest pressure/discomfort, dyspnea, palpitations, irregular heart beats, near-syncope, syncope, fatigue, orthopnea, paroxysmal nocturnal dyspnea, lower extremity edema. Visit Vitals    /58 (BP 1 Location: Left arm, BP Patient Position: Standing)    Pulse 80    Resp 16    Ht 5' 7\" (1.702 m)    Wt 150 lb 14.4 oz (68.4 kg)    SpO2 95%    BMI 23.63 kg/m2     Current Outpatient Prescriptions   Medication Sig    clopidogrel (PLAVIX) 75 mg tab TAKE ONE TABLET BY MOUTH ONCE DAILY    isosorbide mononitrate ER (IMDUR) 60 mg CR tablet TAKE ONE TABLET BY MOUTH ONCE DAILY    lovastatin (MEVACOR) 20 mg tablet Take 20 mg by mouth nightly.  nitroglycerin (NITROSTAT) 0.4 mg SL tablet 1 Tab by SubLINGual route every five (5) minutes as needed for Chest Pain.  metoprolol tartrate (LOPRESSOR) 25 mg tablet TAKE ONE-HALF TABLET BY MOUTH TWICE DAILY    magnesium oxide (MAG-OX) 400 mg tablet Take 400 mg by mouth daily.  calcium 500 mg tab Take 1 Tab by mouth daily.  oxybutynin (DITROPAN) 5 mg tablet Take 5 mg by mouth daily.  lisinopril (PRINIVIL, ZESTRIL) 40 mg tablet Take 1 tablet by mouth daily. (Patient taking differently: Take 25 mg by mouth daily.)    insulin NPH (HUMULIN N) 100 unit/mL (3 mL) pen by SubCUTAneous route as needed (as needed with dinner). Sliding scale    cholecalciferol, vitamin D3, (VITAMIN D3) 4,000 unit cap Take  by mouth daily.  levothyroxine (SYNTHROID) 50 mcg tablet Take 50 mcg by mouth daily (before breakfast).  INSULIN LISPRO (HUMALOG SC) 6 Units by SubCUTAneous route three (3) times daily (with meals).  CINNAMON BARK (CINNAMON PO) Take 1,000 mg by mouth daily. No current facility-administered medications for this visit. Objective:      Visit Vitals    /58 (BP 1 Location: Left arm, BP Patient Position: Standing)    Pulse 80    Resp 16    Ht 5' 7\" (1.702 m)    Wt 150 lb 14.4 oz (68.4 kg)    SpO2 95%    BMI 23.63 kg/m2       Data Review:     Reviewed and/or ordered active problem list, medication list tests    Past Medical History:   Diagnosis Date    Arthritis     BILAT HANDS    CAD (coronary artery disease)     high cholesterol; heart murmur    Cancer (HCC)     uterine    Chronic kidney disease     Diabetes ([facility] [address]     Endocrine disease     hypothyroidism    Hypertension     Neurological disorder     TIA    Other ill-defined conditions(799.89)     PVD; benign right breast tumor removed    Peripheral vascular angioplasty status 8/14/2013 8/14/13 s/p angioplasty L RFA    Stroke ([facility] [address]     TIA  X3 NO RESIDUAL    Thyroid disease     HYPOTHYROID      Past Surgical History:   Procedure Laterality Date    BREAST SURGERY PROCEDURE UNLISTED      tumor removed    CARDIAC SURG PROCEDURE UNLIST      CARDIAC STENT  X2    HX ANKLE FRACTURE TX      pins and plates right ankle    HX GYN      hysterectomy    HX OTHER SURGICAL      fem pop bypass on left     Allergies   Allergen Reactions    Pcn [Penicillins] Anaphylaxis    Codeine Palpitations    Ivp Dye [Fd And C Blue No.1] Nausea and Vomiting     \"I was told in [place] that it would stop my kidney up if I got it again. \"      Family History   Problem Relation Age of Onset    Cancer Mother     Hypertension Sister     Hypertension Sister     Heart Disease Brother 72    Stroke Sister      x2 sisters    Diabetes Father     Cancer Father       Social History     Social History    Marital status:      Spouse name: N/A    Number of children: 11    Years of education: N/A     Occupational History     Not Employed     Social History Main Topics    Smoking status: Former Smoker     Packs/day: 5.00     Years: 30.00     Quit date: 9/1/2000    Smokeless tobacco: Never Used    Alcohol use No    Drug use: No    Sexual activity: Not on file     Other Topics Concern    Not on file     Social History Narrative         Review of Systems     General: Not Present- Anorexia, Chills, Dietary Changes, Fatigue, Fever, Medication Changes, Night Sweats, Weight Gain > 10lbs. and Weight Loss > 10lbs. .  Skin: Not Present- Bruising and Excessive Sweating. HEENT: Not Present- Headache, Visual Loss and Vertigo. Respiratory: Not Present- Cough, Decreased Exercise Tolerance, Difficulty Breathing, Snoring and Wheezing. Cardiovascular: Not Present- Abnormal Blood Pressure, Chest Pain, Difficulty Breathing On Exertion, Edema, Fainting / Blacking Out, Irregular Heart Beat, Orthopnea, Palpitations, Paroxysmal Nocturnal Dyspnea, Rapid Heart Rate, Shortness of Breath and Swelling of Extremities. Gastrointestinal: Not Present- Black, Tarry Stool, Bloody Stool, Diarrhea, Hematemesis, Rectal Bleeding and Vomiting. Musculoskeletal: Not Present- Muscle Pain and Muscle Weakness. Neurological: Not Present- Dizziness. Psychiatric: Not Present- Depression. Endocrine: Not Present- Cold Intolerance, Heat Intolerance and Thyroid Problems. Hematology: Not Present- Abnormal Bleeding, Anemia, Blood Clots and Easy Bruising.       Physical Exam   The physical exam findings are as follows:       General   Mental Status - Alert. General Appearance - Not in acute distress. Chest and Lung Exam   Inspection: Accessory muscles - No use of accessory muscles in breathing. Auscultation:   Breath sounds: - Normal.      Cardiovascular   Inspection: Jugular vein - Bilateral - Inspection Normal.  Palpation/Percussion:   Apical Impulse: - Normal.  Auscultation: Rhythm - Regular. Heart Sounds - S1 WNL and S2 WNL. No S3 or S4. Murmurs & Other Heart Sounds: Auscultation of the heart reveals - No Murmurs. Carotid arteries - No Carotid bruit.       Peripheral Vascular   Upper Extremity: Inspection - Bilateral - No Cyanotic nailbeds or Digital clubbing. Lower Extremity:   Palpation: Dorsalis pedis pulse - Bilateral - NP. Posterior tibia pulse - Bilateral - NP. Edema - Bilateral - No edema. Assessment:       ICD-10-CM ICD-9-CM    1. PAD (peripheral artery disease) ([facility]) I73.9 443.9 CBC W/O DIFF      METABOLIC PANEL, COMPREHENSIVE      PROTHROMBIN TIME + INR      IR ANGIO EXT LOWER BI   2. Atherosclerosis of native artery of both lower extremities with intermittent claudication ([facility]) I70.213 440.21 CBC W/O DIFF      METABOLIC PANEL, COMPREHENSIVE      PROTHROMBIN TIME + INR      IR ANGIO EXT LOWER BI   3. S/P coronary artery stent placement Z95.5 V45.82 CBC W/O DIFF      METABOLIC PANEL, COMPREHENSIVE      PROTHROMBIN TIME + INR      IR ANGIO EXT LOWER BI   4. Atherosclerosis of native coronary artery of native heart without angina pectoris I25.10 414.01 CBC W/O DIFF      METABOLIC PANEL, COMPREHENSIVE      PROTHROMBIN TIME + INR      IR ANGIO EXT LOWER BI   5. Mixed hyperlipidemia E78.2 272.2 CBC W/O DIFF      METABOLIC PANEL, COMPREHENSIVE      PROTHROMBIN TIME + INR      IR ANGIO EXT LOWER BI   6. Essential hypertension I10 401.9 CBC W/O DIFF      METABOLIC PANEL, COMPREHENSIVE      PROTHROMBIN TIME + INR      IR ANGIO EXT LOWER BI       Plan:     ATHEROSCLEROSIS OF NATIVE ARTERIES OF THE EXTREMITIES WITH INTERMITTENT CLAUDICATION    S/p b/l iliac stents 7/2014.  s/p left SFA laser athetectomy and stent 9/2011, s/p left SFA angioplasty for ISR 1/2012. Significant stenosis Rt. SFA.- S/p left SFA redo laser atherectomy and PTA 10/2012. S/p left SFA angioplasty 8/2013     Now having right LE claudication. Non invasive evaluation noted. Anthony class 3. Recommend LE angio. I discussed the risks/benefits/alternatives of the procedure with the patient. The patient understands and agrees to proceed. CAD s/p LCX ARCHANA 1/2012. S/p re intervention 10/2012. S/p RCA  PCI 09/2016. Stable. Can stop DAPT.  Due to h/o PAD will keep her on plavix. Nose bleed stopped after stopping aspirin.           HYPERLIPIDEMIA, MIXED    On statin.        BENIGN ESSENTIAL HYPERTENSION    Controlled.       Carotid bruit- asymptomatic.  Carotid dopplers in 2011 without significant disease

## 2017-10-24 NOTE — PROGRESS NOTES
Chief Complaint   Patient presents with    Other     vacular consult to discuss le arterial doppler result-dizziness

## 2017-10-25 LAB
ALBUMIN SERPL-MCNC: 4.4 G/DL (ref 3.6–4.8)
ALBUMIN/GLOB SERPL: 1.9 {RATIO} (ref 1.2–2.2)
ALP SERPL-CCNC: 84 IU/L (ref 39–117)
ALT SERPL-CCNC: 11 IU/L (ref 0–32)
AST SERPL-CCNC: 20 IU/L (ref 0–40)
BILIRUB SERPL-MCNC: 0.2 MG/DL (ref 0–1.2)
BUN SERPL-MCNC: 21 MG/DL (ref 8–27)
BUN/CREAT SERPL: 21 (ref 12–28)
CALCIUM SERPL-MCNC: 9.4 MG/DL (ref 8.7–10.3)
CHLORIDE SERPL-SCNC: 101 MMOL/L (ref 96–106)
CO2 SERPL-SCNC: 28 MMOL/L (ref 18–29)
CREAT SERPL-MCNC: 1.01 MG/DL (ref 0.57–1)
ERYTHROCYTE [DISTWIDTH] IN BLOOD BY AUTOMATED COUNT: 13.3 % (ref 12.3–15.4)
GFR SERPLBLD CREATININE-BSD FMLA CKD-EPI: 58 ML/MIN/1.73
GFR SERPLBLD CREATININE-BSD FMLA CKD-EPI: 67 ML/MIN/1.73
GLOBULIN SER CALC-MCNC: 2.3 G/DL (ref 1.5–4.5)
GLUCOSE SERPL-MCNC: 143 MG/DL (ref 65–99)
HCT VFR BLD AUTO: 37.7 % (ref 34–46.6)
HGB BLD-MCNC: 12.6 G/DL (ref 11.1–15.9)
INR PPP: 1 (ref 0.8–1.2)
INTERPRETATION: NORMAL
MCH RBC QN AUTO: 30.1 PG (ref 26.6–33)
MCHC RBC AUTO-ENTMCNC: 33.4 G/DL (ref 31.5–35.7)
MCV RBC AUTO: 90 FL (ref 79–97)
PLATELET # BLD AUTO: 187 X10E3/UL (ref 150–379)
POTASSIUM SERPL-SCNC: 5.2 MMOL/L (ref 3.5–5.2)
PROT SERPL-MCNC: 6.7 G/DL (ref 6–8.5)
PROTHROMBIN TIME: 10.6 SEC (ref 9.1–12)
RBC # BLD AUTO: 4.19 X10E6/UL (ref 3.77–5.28)
SODIUM SERPL-SCNC: 144 MMOL/L (ref 134–144)
WBC # BLD AUTO: 4.4 X10E3/UL (ref 3.4–10.8)

## 2017-10-30 ENCOUNTER — HOSPITAL ENCOUNTER (OUTPATIENT)
Dept: CARDIAC CATH/INVASIVE PROCEDURES | Age: 67
Discharge: HOME | End: 2017-10-30
Attending: INTERNAL MEDICINE | Admitting: INTERNAL MEDICINE
Payer: MEDICARE

## 2017-10-30 VITALS
HEIGHT: 66 IN | BODY MASS INDEX: 22.5 KG/M2 | WEIGHT: 140 LBS | OXYGEN SATURATION: 95 % | DIASTOLIC BLOOD PRESSURE: 53 MMHG | TEMPERATURE: 96.9 F | HEART RATE: 83 BPM | RESPIRATION RATE: 14 BRPM | SYSTOLIC BLOOD PRESSURE: 145 MMHG

## 2017-10-30 DIAGNOSIS — I25.10 ATHEROSCLEROSIS OF NATIVE CORONARY ARTERY OF NATIVE HEART WITHOUT ANGINA PECTORIS: ICD-10-CM

## 2017-10-30 DIAGNOSIS — Z95.5 S/P CORONARY ARTERY STENT PLACEMENT: ICD-10-CM

## 2017-10-30 DIAGNOSIS — I10 ESSENTIAL HYPERTENSION: Chronic | ICD-10-CM

## 2017-10-30 DIAGNOSIS — E78.2 MIXED HYPERLIPIDEMIA: Chronic | ICD-10-CM

## 2017-10-30 DIAGNOSIS — I70.213 ATHEROSCLEROSIS OF NATIVE ARTERY OF BOTH LOWER EXTREMITIES WITH INTERMITTENT CLAUDICATION (HCC): ICD-10-CM

## 2017-10-30 DIAGNOSIS — I73.9 PAD (PERIPHERAL ARTERY DISEASE) (HCC): ICD-10-CM

## 2017-10-30 LAB
ACT BLD: 114 SECS (ref 79–138)
GLUCOSE BLD STRIP.AUTO-MCNC: 256 MG/DL (ref 65–100)
GLUCOSE BLD STRIP.AUTO-MCNC: 284 MG/DL (ref 65–100)
SERVICE CMNT-IMP: ABNORMAL
SERVICE CMNT-IMP: ABNORMAL

## 2017-10-30 PROCEDURE — C1894 INTRO/SHEATH, NON-LASER: HCPCS

## 2017-10-30 PROCEDURE — 75716 ARTERY X-RAYS ARMS/LEGS: CPT

## 2017-10-30 PROCEDURE — 74011250636 HC RX REV CODE- 250/636: Performed by: INTERNAL MEDICINE

## 2017-10-30 PROCEDURE — C1884 EMBOLIZATION PROTECT SYST: HCPCS

## 2017-10-30 PROCEDURE — C1887 CATHETER, GUIDING: HCPCS

## 2017-10-30 PROCEDURE — 82962 GLUCOSE BLOOD TEST: CPT

## 2017-10-30 PROCEDURE — 99152 MOD SED SAME PHYS/QHP 5/>YRS: CPT

## 2017-10-30 PROCEDURE — 74011250636 HC RX REV CODE- 250/636

## 2017-10-30 PROCEDURE — C1714 CATH, TRANS ATHERECTOMY, DIR: HCPCS

## 2017-10-30 PROCEDURE — 85347 COAGULATION TIME ACTIVATED: CPT

## 2017-10-30 PROCEDURE — C1769 GUIDE WIRE: HCPCS

## 2017-10-30 PROCEDURE — C1725 CATH, TRANSLUMIN NON-LASER: HCPCS

## 2017-10-30 PROCEDURE — C2623 CATH, TRANSLUMIN, DRUG-COAT: HCPCS

## 2017-10-30 PROCEDURE — 77030029065 HC DRSG HEMO QCLOT ZMED -B

## 2017-10-30 PROCEDURE — 77030019697 HC SYR ANGI INFL MRTM -B

## 2017-10-30 PROCEDURE — 77030010852 HC CATH NB ADVNTG COOK -B

## 2017-10-30 PROCEDURE — 74011636320 HC RX REV CODE- 636/320

## 2017-10-30 PROCEDURE — 77030021533 HC CATH ANGI DX PRFMA MRTM -A

## 2017-10-30 PROCEDURE — 99153 MOD SED SAME PHYS/QHP EA: CPT

## 2017-10-30 PROCEDURE — 74011000250 HC RX REV CODE- 250

## 2017-10-30 RX ORDER — MIDAZOLAM HYDROCHLORIDE 1 MG/ML
INJECTION, SOLUTION INTRAMUSCULAR; INTRAVENOUS
Status: COMPLETED
Start: 2017-10-30 | End: 2017-10-30

## 2017-10-30 RX ORDER — SODIUM CHLORIDE 9 MG/ML
100 INJECTION, SOLUTION INTRAVENOUS CONTINUOUS
Status: DISCONTINUED | OUTPATIENT
Start: 2017-10-30 | End: 2017-10-31 | Stop reason: HOSPADM

## 2017-10-30 RX ORDER — DIPHENHYDRAMINE HYDROCHLORIDE 50 MG/ML
25-50 INJECTION, SOLUTION INTRAMUSCULAR; INTRAVENOUS ONCE
Status: COMPLETED | OUTPATIENT
Start: 2017-10-30 | End: 2017-10-30

## 2017-10-30 RX ORDER — LIDOCAINE HYDROCHLORIDE 10 MG/ML
INJECTION INFILTRATION; PERINEURAL
Status: COMPLETED
Start: 2017-10-30 | End: 2017-10-30

## 2017-10-30 RX ORDER — FENTANYL CITRATE 50 UG/ML
INJECTION, SOLUTION INTRAMUSCULAR; INTRAVENOUS
Status: COMPLETED
Start: 2017-10-30 | End: 2017-10-30

## 2017-10-30 RX ORDER — HYDROCORTISONE SODIUM SUCCINATE 100 MG/2ML
INJECTION, POWDER, FOR SOLUTION INTRAMUSCULAR; INTRAVENOUS
Status: COMPLETED
Start: 2017-10-30 | End: 2017-10-30

## 2017-10-30 RX ORDER — HEPARIN SODIUM 200 [USP'U]/100ML
500 INJECTION, SOLUTION INTRAVENOUS ONCE
Status: COMPLETED | OUTPATIENT
Start: 2017-10-30 | End: 2017-10-30

## 2017-10-30 RX ORDER — IODIXANOL 320 MG/ML
INJECTION, SOLUTION INTRAVASCULAR
Status: COMPLETED
Start: 2017-10-30 | End: 2017-10-30

## 2017-10-30 RX ORDER — DIPHENHYDRAMINE HYDROCHLORIDE 50 MG/ML
INJECTION, SOLUTION INTRAMUSCULAR; INTRAVENOUS
Status: COMPLETED
Start: 2017-10-30 | End: 2017-10-30

## 2017-10-30 RX ORDER — MIDAZOLAM HYDROCHLORIDE 1 MG/ML
.5-2 INJECTION, SOLUTION INTRAMUSCULAR; INTRAVENOUS
Status: DISCONTINUED | OUTPATIENT
Start: 2017-10-30 | End: 2017-10-30

## 2017-10-30 RX ORDER — FENTANYL CITRATE 50 UG/ML
25-50 INJECTION, SOLUTION INTRAMUSCULAR; INTRAVENOUS
Status: DISCONTINUED | OUTPATIENT
Start: 2017-10-30 | End: 2017-10-30

## 2017-10-30 RX ORDER — HEPARIN SODIUM 1000 [USP'U]/ML
INJECTION, SOLUTION INTRAVENOUS; SUBCUTANEOUS
Status: COMPLETED
Start: 2017-10-30 | End: 2017-10-30

## 2017-10-30 RX ORDER — HYDROCORTISONE SODIUM SUCCINATE 100 MG/2ML
100 INJECTION, POWDER, FOR SOLUTION INTRAMUSCULAR; INTRAVENOUS
Status: COMPLETED | OUTPATIENT
Start: 2017-10-30 | End: 2017-10-30

## 2017-10-30 RX ORDER — LIDOCAINE HYDROCHLORIDE 10 MG/ML
1-20 INJECTION INFILTRATION; PERINEURAL
Status: DISCONTINUED | OUTPATIENT
Start: 2017-10-30 | End: 2017-10-30

## 2017-10-30 RX ORDER — PROTAMINE SULFATE 10 MG/ML
40 INJECTION, SOLUTION INTRAVENOUS
Status: COMPLETED | OUTPATIENT
Start: 2017-10-30 | End: 2017-10-30

## 2017-10-30 RX ORDER — HEPARIN SODIUM 1000 [USP'U]/ML
1000-10000 INJECTION, SOLUTION INTRAVENOUS; SUBCUTANEOUS
Status: DISCONTINUED | OUTPATIENT
Start: 2017-10-30 | End: 2017-10-30

## 2017-10-30 RX ORDER — PROTAMINE SULFATE 10 MG/ML
INJECTION, SOLUTION INTRAVENOUS
Status: DISCONTINUED
Start: 2017-10-30 | End: 2017-10-30

## 2017-10-30 RX ORDER — HEPARIN SODIUM 200 [USP'U]/100ML
INJECTION, SOLUTION INTRAVENOUS
Status: COMPLETED
Start: 2017-10-30 | End: 2017-10-30

## 2017-10-30 RX ADMIN — HEPARIN SODIUM 1000 UNITS: 200 INJECTION, SOLUTION INTRAVENOUS at 14:53

## 2017-10-30 RX ADMIN — MIDAZOLAM HYDROCHLORIDE 1 MG: 1 INJECTION, SOLUTION INTRAMUSCULAR; INTRAVENOUS at 14:37

## 2017-10-30 RX ADMIN — MIDAZOLAM HYDROCHLORIDE 1.5 MG: 1 INJECTION, SOLUTION INTRAMUSCULAR; INTRAVENOUS at 15:05

## 2017-10-30 RX ADMIN — LIDOCAINE HYDROCHLORIDE 13 ML: 10 INJECTION INFILTRATION; PERINEURAL at 15:10

## 2017-10-30 RX ADMIN — LIDOCAINE HYDROCHLORIDE 13 ML: 10 INJECTION, SOLUTION INFILTRATION; PERINEURAL at 15:10

## 2017-10-30 RX ADMIN — HEPARIN SODIUM 3000 UNITS: 1000 INJECTION, SOLUTION INTRAVENOUS; SUBCUTANEOUS at 15:53

## 2017-10-30 RX ADMIN — LIDOCAINE HYDROCHLORIDE 7 ML: 10 INJECTION INFILTRATION; PERINEURAL at 14:45

## 2017-10-30 RX ADMIN — NITROGLYCERIN 200 MCG: 5 INJECTION, SOLUTION INTRAVENOUS at 15:59

## 2017-10-30 RX ADMIN — IODIXANOL 40 ML: 320 INJECTION, SOLUTION INTRAVASCULAR at 16:05

## 2017-10-30 RX ADMIN — MIDAZOLAM HYDROCHLORIDE 1 MG: 1 INJECTION, SOLUTION INTRAMUSCULAR; INTRAVENOUS at 15:59

## 2017-10-30 RX ADMIN — DIPHENHYDRAMINE HYDROCHLORIDE 50 MG: 50 INJECTION, SOLUTION INTRAMUSCULAR; INTRAVENOUS at 12:58

## 2017-10-30 RX ADMIN — IODIXANOL 24 ML: 320 INJECTION, SOLUTION INTRAVASCULAR at 14:51

## 2017-10-30 RX ADMIN — IODIXANOL 80 ML: 320 INJECTION, SOLUTION INTRAVASCULAR at 15:37

## 2017-10-30 RX ADMIN — FENTANYL CITRATE 25 MCG: 50 INJECTION, SOLUTION INTRAMUSCULAR; INTRAVENOUS at 15:09

## 2017-10-30 RX ADMIN — HYDROCORTISONE SODIUM SUCCINATE 100 MG: 100 INJECTION, POWDER, FOR SOLUTION INTRAMUSCULAR; INTRAVENOUS at 12:58

## 2017-10-30 RX ADMIN — FENTANYL CITRATE 25 MCG: 50 INJECTION, SOLUTION INTRAMUSCULAR; INTRAVENOUS at 15:59

## 2017-10-30 RX ADMIN — FENTANYL CITRATE 25 MCG: 50 INJECTION, SOLUTION INTRAMUSCULAR; INTRAVENOUS at 14:37

## 2017-10-30 RX ADMIN — MIDAZOLAM HYDROCHLORIDE 0.5 MG: 1 INJECTION, SOLUTION INTRAMUSCULAR; INTRAVENOUS at 15:09

## 2017-10-30 RX ADMIN — MIDAZOLAM HYDROCHLORIDE 1 MG: 1 INJECTION, SOLUTION INTRAMUSCULAR; INTRAVENOUS at 14:46

## 2017-10-30 RX ADMIN — HEPARIN SODIUM 6000 UNITS: 1000 INJECTION, SOLUTION INTRAVENOUS; SUBCUTANEOUS at 15:30

## 2017-10-30 RX ADMIN — LIDOCAINE HYDROCHLORIDE 7 ML: 10 INJECTION, SOLUTION INFILTRATION; PERINEURAL at 14:45

## 2017-10-30 RX ADMIN — FENTANYL CITRATE 25 MCG: 50 INJECTION, SOLUTION INTRAMUSCULAR; INTRAVENOUS at 14:56

## 2017-10-30 RX ADMIN — FENTANYL CITRATE 25 MCG: 50 INJECTION, SOLUTION INTRAMUSCULAR; INTRAVENOUS at 15:05

## 2017-10-30 RX ADMIN — PROTAMINE SULFATE 40 MG: 10 INJECTION, SOLUTION INTRAVENOUS at 16:03

## 2017-10-30 NOTE — DISCHARGE INSTRUCTIONS
[address] [place] [address]  [phone]      CARDIOLOGY DISCHARGE INSTRUCTIONS      Patient ID:  Yuriy Mehta  [id number]  79 y.o.  1950    Admit Date: 10/30/2017    Discharge Date: 10/30/2017     Admitting Physician: Anastasiia Gunter MD     Discharge Physician: Anastasiia Gunter MD    Admission Diagnoses:   PAD (peripheral artery disease) [facility]) [I73.9]  Atherosclerosis of native artery of both lower extremities with intermittent claudication ([address] [I70.213]  S/P coronary artery stent placement [Z95.5]  Atherosclerosis of native coronary artery of native heart without angina pectoris [I25.10]  Mixed hyperlipidemia [E78.2]  Essential hypertension [I10]    Discharge Diagnoses: Active Problems:    * No active hospital problems. *      Discharge Condition: Good    Cardiology Procedures this Admission:  Rt. SFA atherectomy and DCB PTA    Disposition: home    Reference discharge instructions provided by nursing for diet and activity. Signed: Anastasiia Gunter MD  10/30/2017  4:15 PM  PERIPHERAL CATHETERIZATION/PCI DISCHARGE INSTRUCTIONS    It is normal to feel tired the first couple days. Take it easy and follow the physicians instructions. CHECK THE CATHETER INSERTION SITE DAILY:    You may shower 24 hours after the procedure, remove the bandage during showering. Wash with soap and water and pat dry. Gentle cleaning of the site with soap and water is sufficient, cover with a dry clean dressing or bandage. Do not apply creams or powders to the area. Do not sit in a bathtub or pool of water for 7 days or until wound has completely healed. Temporary bruising and discomfort is normal and may last a few weeks. You may have a  formation of a small lump at the site which may last up to 6 weeks.     CALL THE PHYSICIANS:    If the site becomes red, swollen or feels warm to the touch  If there is bleeding or drainage or if there is unusual pain at the groin or down the leg. If there is any bleeding, lie down, apply pressure or have someone apply pressure with a clean cloth until the bleeding stops. If the bleeding continues, call 911 to be transported to the hospital.  DO NOT DRIVE YOURSELF, [address] ACTIVITY:    For the first 24-48 hours or as instructed by the physician:  No lifting, pushing or pulling over 10 pounds and no straining the insertion site. Do not life grocery bags or the garbage can, do not run the vacuum  or  for 7 days. Start with short walks as in the hospital and gradually increase as tolerated each day. It is recommended to walk 30 minutes 5-7 days per week. Follow your physicians instructions on activity. Avoid walking outside in extremes of heat or cold. Walk inside when it is cold and windy or hot and humid. Things to keep in mind:  No driving for at least 24 hours, or as designated by your physician. Limit the number of times you go up and down the stairs  Take rests and pace yourself with activity. Be careful and do not strain with bowel movements. MEDICATIONS:    Take all medications as prescribed  Call your physician if you have any questions  Keep an updated list of your medications with you at all times and give a list to your physician and pharmacist      AFTER CARE:    Follow up with your physician as instructed. Follow a heart healthy diet with proper portion control, daily stress management, daily exercise, blood pressure and cholesterol control , and smoking cessation.

## 2017-10-30 NOTE — PROGRESS NOTES
SHEATH PULL NOTE:    Patient informed of procedure with questions answered with review. Sheath site prepped with Chloraprep swab. 7 fr sheath in RFA pulled by Avani Simon RN. Hand hold and quick clot, with manual compression to site. No bleeding, no hematoma, no pain at site. Hemostasis obtained with hand hold/manual compression at site. Patient tolerated well. No change in status. Handhold for 15 minutes. No change at site. Dry sterile dressing applied to site. No bleeding, no hematoma, no pain/discomfort at site. Groin instructions provided with review. Continue to monitor procedure site and patient status. *Advised patient to keep head flat and extremity flat to decrease risk of bleeding. *Recommended that patient not drink for ONE HOUR post sheath pull completion. *Recommended that patient not eat for TWO HOURS post sheath pull completion. *Instructed patient on rationale for delay of PO products to decrease risk for aspiration and if additional treatment to procedure site is required. Patient verbalized understanding of instructions with review.

## 2017-10-30 NOTE — PROGRESS NOTES
SHEATH PULL NOTE:    Patient informed of procedure with questions answered with review. Sheath site prepped with Chloraprep swab. 5 fr sheath in LFA pulled by Anisa Lizama RN. Hand hold and quick clot, with manual compression to site. No bleeding, no hematoma, no pain at site. Hemostasis obtained with hand hold/manual compression at site. Patient tolerated well. No change in status. Handhold for 15 minutes. No change at site. Dry sterile dressing applied to site. No bleeding, no hematoma, no pain/discomfort at site. Groin instructions provided with review. Continue to monitor procedure site and patient status. *Advised patient to keep head flat and extremity flat to decrease risk of bleeding. *Recommended that patient not drink for ONE HOUR post sheath pull completion. *Recommended that patient not eat for TWO HOURS post sheath pull completion. *Instructed patient on rationale for delay of PO products to decrease risk for aspiration and if additional treatment to procedure site is required. Patient verbalized understanding of instructions with review.

## 2017-10-30 NOTE — PROGRESS NOTES
Cardiac Cath Lab Recovery Arrival Note:      Cesar Seymour arrived to Cardiac Cath Lab, Recovery Area. Staff introduced to patient. Patient identifiers verified with NAME and DATE OF BIRTH. Procedure verified with patient. Consent forms reviewed and signed by patient or authorized representative and verified. Allergies verified. Patient and family oriented to department. Patient and family informed of procedure and plan of care. Questions answered with review. Patient prepped for procedure, per orders from physician, prior to arrival.    Patient on cardiac monitor, non-invasive blood pressure, SPO2 monitor. On room air. Patient is A&Ox 3. Patient reports no c/o. Patient in stretcher, in low position, with side rails up, call bell within reach, patient instructed to call if assistance as needed. Patient prep in: [address], [place] [address]. Patient family has pager # none  Family in: [facility] waiting area.    Prep by: Lauren Garrett, SREEKANTH and Mak Isbell RN

## 2017-10-30 NOTE — IP AVS SNAPSHOT
[address] [facility] 
[phone] Patient: Gianfranco Mancera MRN: [id number] KKK:[id number] About your hospitalization You were admitted on:  October 30, 2017 You last received care in the:  [facility] 2 INTRVNTNL CARDIO You were discharged on:  October 30, 2017 Why you were hospitalized Your primary diagnosis was:  Not on File Things You Need To Do (next 8 weeks) Follow up with Scarlett Menchaca MD  
  
Phone:  [phone] Where:  [address] [place], [address] Tuesday Nov 14, 2017 Vascular Consult with Monik Lord MD at  3:15 PM  
Where:  [facility] [address] [place] [address]) Discharge Orders None A check peewee indicates which time of day the medication should be taken. My Medications TAKE these medications as instructed Instructions Each Dose to Equal  
 Morning Noon Evening Bedtime  
 calcium 500 mg Tab Your last dose was: Your next dose is: Take 1 Tab by mouth daily. 1 Tab CINNAMON PO Your last dose was: Your next dose is: Take 1,000 mg by mouth daily. 1000 mg  
    
   
   
   
  
 clopidogrel 75 mg Tab Commonly known as:  PLAVIX Your last dose was: Your next dose is: TAKE ONE TABLET BY MOUTH ONCE DAILY HUMALOG SC Your last dose was: Your next dose is:    
   
   
 6 Units by SubCUTAneous route three (3) times daily (with meals). 6 Units  
    
   
   
   
  
 insulin  unit/mL (3 mL) Inpn Commonly known as:  HUMULIN N Your last dose was: Your next dose is:    
   
   
 by SubCUTAneous route as needed (as needed with dinner). Sliding scale  
     
   
   
   
  
 isosorbide mononitrate ER 60 mg CR tablet Commonly known as:  IMDUR  
   
 Your last dose was: Your next dose is: TAKE ONE TABLET BY MOUTH ONCE DAILY  
     
   
   
   
  
 levothyroxine 50 mcg tablet Commonly known as:  SYNTHROID Your last dose was: Your next dose is: Take 50 mcg by mouth daily (before breakfast). 50 mcg  
    
   
   
   
  
 lisinopril 40 mg tablet Commonly known as:  Moncho Shutters Your last dose was: Your next dose is: Take 1 tablet by mouth daily. 40 mg  
    
   
   
   
  
 lovastatin 20 mg tablet Commonly known as:  MEVACOR Your last dose was: Your next dose is: Take 20 mg by mouth nightly. 20 mg  
    
   
   
   
  
 magnesium oxide 400 mg tablet Commonly known as:  MAG-OX Your last dose was: Your next dose is: Take 400 mg by mouth daily. 400 mg  
    
   
   
   
  
 metoprolol tartrate 25 mg tablet Commonly known as:  LOPRESSOR Your last dose was: Your next dose is: TAKE ONE-HALF TABLET BY MOUTH TWICE DAILY  
     
   
   
   
  
 nitroglycerin 0.4 mg SL tablet Commonly known as:  NITROSTAT Your last dose was: Your next dose is:    
   
   
 1 Tab by SubLINGual route every five (5) minutes as needed for Chest Pain. 0.4 mg  
    
   
   
   
  
 oxybutynin 5 mg tablet Commonly known as:  ZGCJHZDQ Your last dose was: Your next dose is: Take 5 mg by mouth daily. 5 mg VITAMIN D3 4,000 unit Cap Generic drug:  cholecalciferol (vitamin D3) Your last dose was: Your next dose is: Take  by mouth daily. Discharge Instructions [address]  [phone] CARDIOLOGY DISCHARGE INSTRUCTIONS Patient ID: 
Davi Pineda [id number] 
16 y.o. 
1950 Admit Date: 10/30/2017 Discharge Date: 10/30/2017 Admitting Physician: Justin Wilson MD  
 
Discharge Physician: Justin Wilson MD 
 
Admission Diagnoses:  
PAD (peripheral artery disease) ([facility] [I73.9] Atherosclerosis of native artery of both lower extremities with intermittent claudication ([facility] [I70.213] S/P coronary artery stent placement [Z95.5] Atherosclerosis of native coronary artery of native heart without angina pectoris [I25.10] Mixed hyperlipidemia [E78.2] Essential hypertension [I10] Discharge Diagnoses: Active Problems: * No active hospital problems. * Discharge Condition: Good Cardiology Procedures this Admission:  Rt. SFA atherectomy and DCB PTA Disposition: home Reference discharge instructions provided by nursing for diet and activity. Signed: Justin Wilson MD 
10/30/2017 4:15 PM 
PERIPHERAL CATHETERIZATION/PCI DISCHARGE INSTRUCTIONS It is normal to feel tired the first couple days. Take it easy and follow the physicians instructions. CHECK THE CATHETER INSERTION SITE DAILY: 
 
You may shower 24 hours after the procedure, remove the bandage during showering. Wash with soap and water and pat dry. Gentle cleaning of the site with soap and water is sufficient, cover with a dry clean dressing or bandage. Do not apply creams or powders to the area. Do not sit in a bathtub or pool of water for 7 days or until wound has completely healed. Temporary bruising and discomfort is normal and may last a few weeks. You may have a  formation of a small lump at the site which may last up to 6 weeks. CALL THE PHYSICIANS: 
 
If the site becomes red, swollen or feels warm to the touch If there is bleeding or drainage or if there is unusual pain at the groin or down the leg. If there is any bleeding, lie down, apply pressure or have someone apply pressure with a clean cloth until the bleeding stops.   
If the bleeding continues, call 911 to be transported to the hospital. 
 DO NOT DRIVE YOURSELF, OR HAVE ANYONE ELSE DRIVE YOU  CALL [phone]. ACTIVITY: 
 
For the first 24-48 hours or as instructed by the physician: No lifting, pushing or pulling over 10 pounds and no straining the insertion site. Do not life grocery bags or the garbage can, do not run the vacuum  or  for 7 days. Start with short walks as in the hospital and gradually increase as tolerated each day. It is recommended to walk 30 minutes 5-7 days per week. Follow your physicians instructions on activity. Avoid walking outside in extremes of heat or cold. Walk inside when it is cold and windy or hot and humid. Things to keep in mind: 
No driving for at least 24 hours, or as designated by your physician. Limit the number of times you go up and down the stairs Take rests and pace yourself with activity. Be careful and do not strain with bowel movements. MEDICATIONS: 
 
Take all medications as prescribed Call your physician if you have any questions Keep an updated list of your medications with you at all times and give a list to your physician and pharmacist 
 
 
AFTER CARE: 
 
Follow up with your physician as instructed. Follow a heart healthy diet with proper portion control, daily stress management, daily exercise, blood pressure and cholesterol control , and smoking cessation. Introducing [facility] & HEALTH SERVICES! Dear Epifania Hashimoto: Thank you for requesting a [employer] account. Our records indicate that you already have an active [employer] account. You can access your account anytime at [email or web address] Did you know that you can access your hospital and ER discharge instructions at any time in [employer]? You can also review all of your test results from your hospital stay or ER visit. Additional Information If you have questions, please visit the Frequently Asked Questions section of the [employer] website at [email or web address] Remember, [email or web address] is NOT to be used for urgent needs. For medical emergencies, dial 911. Now available from your iPhone and Android! Providers Seen During Your Hospitalization Provider Specialty Primary office phone Monik Lord MD Cardiology [phone] Your Primary Care Physician (PCP) Primary Care Physician Office Phone Office Fax Joshua Galeana [phone] You are allergic to the following Allergen Reactions Pcn (Penicillins) Anaphylaxis Codeine Palpitations Ivp Dye (Fd And C Blue No.1) Nausea and Vomiting \"I was told in [place] that it would stop my kidney up if I got it again. \" Recent Documentation Height Weight Breastfeeding? BMI OB Status Smoking Status 1.676 m 63.5 kg No 22.6 kg/m2 Hysterectomy Former Smoker Emergency Contacts Name Discharge Info Relation Home Work Mobile Jamarcus Lorenzana DISCHARGE CAREGIVER [3] Spouse [3] [phone] Patient Belongings The following personal items are in your possession at time of discharge: 
  Dental Appliances: None  Visual Aid: Glasses      Home Medications: None   Jewelry: None  Clothing: At bedside    Other Valuables: None  Personal Items Sent to Safe: none Please provide this summary of care documentation to your next provider. Signatures-by signing, you are acknowledging that this After Visit Summary has been reviewed with you and you have received a copy. Patient Signature:  ____________________________________________________________ Date:  ____________________________________________________________  
  
[facility] Provider Signature:  ____________________________________________________________ Date:  ____________________________________________________________

## 2017-10-31 LAB — ACT BLD: 219 SECS (ref 79–138)

## 2017-10-31 NOTE — PROGRESS NOTES
Patient ambulating in hallway without difficulty. Bilateral groin sites remain C/D/I.  VSS. Tolerated PO intake. Patient voiding without difficulty.

## 2017-10-31 NOTE — PROGRESS NOTES
Discharge instructions reviewed with patient and spouse. Opportunity for questions and clarification was provided. VSS. Patient wheeled down for discharge home with .

## 2017-10-31 NOTE — PROCEDURES
[address]   CORONARY ANGIOGRAPHY       Name:  Claria Meigs   MR#:  [id number]   :  1950   Account #:  [de-identified]        Date of Adm:  10/30/2017       DATE OF PROCEDURE: 10/30/2017     PROCEDURE    1. Abdominal aortogram.    2. Bilateral lower extremity angiography. 3. First order catheterization from an antegrade direction from right   common femoral into right superficial femoral artery. 4. Spider filter placement right popliteal artery. 5. Right superficial femoral artery excision atherectomy and drug-  coated balloon dilatation. 6. Moderate sedation for 1 hour 30 minutes. ESTIMATED BLOOD LOSS: Minimal.    SPECIMENS REMOVED: None. FINDINGS    1. Abdominal aorta: Mild atherosclerosis, no evidence of any aortic   aneurysm or aortic stenosis. Bilateral renal arteries   are angiographically patent. 2. Right common iliac artery: Mild disease. Common iliac artery stent is   angiographically apparent. 3. Right external iliac artery: Mild disease. 4. Right internal iliac artery: Mild disease. 5. Right common femoral artery: No significant stenosis. 6. Right profunda femoris artery: Mild disease. 7. Right superficial femoral artery: An area of about 20% to 30%   stenosis in the proximal section and the mid SFA has 80% focal   stenosis. Distally mild SFA disease. 8. Right popliteal artery: Mild disease. 9. Right infrapopliteal vessels: Right anterior tibial artery is    angiographically patent in the proximal segment, distally   anterior tibial artery appears to be occluded, however, one vessel distal run off. Post   tibial artery is patent. 10. Left common iliac artery: Mild disease. Left common iliac artery stent   is angiographically patent. 11. Left external iliac artery: Mild disease. Ext iliac artery: Stent is angiographically apparent.     13. Left internal iliac artery: Mild disease. 14. Left common femoral artery: Mild disease. 15. Left profunda femoris artery: Mild disease. 16. Left superficial femoral artery: Mild disease. Previously placed   stents are angiographically patent with mild in-stent restenosis. An   area of about focal 40% to 50% stenosis is noted in the mid SFA   between the previously placed SFA stents. 17. Left popliteal artery: Mild disease. 18. Left infrapopliteal vessels: Posterior tibial, tibioperoneal trunk   is angiographically patent. Left anterior tibial artery   appears to be 100% occluded. One vessel distal runoff in the left plantar  arch is noted. BRIEF PROCEDURE NOTE: The left groin was prepped. Left femoral   artery access was obtained using ultrasound guidance and fluoro   guidance. A 5-Latvian femoral artery sheath was placed. A 5-Latvian   pigtail guide was advanced to the abdominal aorta. Abdominal   aortogram was performed. Dedicated angiogram of left LE was   performed through the left common femoral artery sheath. Afterwards,   micropuncture wire and micropuncture sheath was used to access   right common femoral artery in antegrade fashion. A dedicated   angiogram of right lower extremity  was performed. The patient   was given IV heparin for anticoagulation. 25 cm 7-Latvian  sheath   was placed over a Versicore wire in right common femoral artery. Long   prowater used to cross lesion. A   #5 spider filer was placed into the popliteal artery. Afterwards, LM [place]   catheter was used for excisional atherectomy of the mid SFA lesion. A   couple of runs were performed. Afterwards 5 x 20  balloon was used   for predilatation. Afterwards 5 x 60 lutonix balloon was placed across the   lesion and was inflated for 2 minutes at 6 mmHg. Final imaging that   was performed revealed excellent angiographic result with residual   stenosis of 0% and excellent flow. No distal embolization.  Patient was   given Protamine for reversal of anticoagulation. COMPLICATIONS: None. ANESTHESIA: IV conscious sedation.          Kim Austin MD      [facility] / Agustin Smith   D:  10/30/2017   16:11   T:  10/31/2017   13:28   Job #:  [id number]

## 2017-11-14 ENCOUNTER — OFFICE VISIT (OUTPATIENT)
Dept: CARDIOLOGY CLINIC | Age: 67
End: 2017-11-14

## 2017-11-14 VITALS
WEIGHT: 152.7 LBS | BODY MASS INDEX: 24.54 KG/M2 | OXYGEN SATURATION: 95 % | DIASTOLIC BLOOD PRESSURE: 60 MMHG | RESPIRATION RATE: 16 BRPM | HEART RATE: 68 BPM | HEIGHT: 66 IN | SYSTOLIC BLOOD PRESSURE: 150 MMHG

## 2017-11-14 DIAGNOSIS — Z98.62 PERIPHERAL VASCULAR ANGIOPLASTY STATUS: ICD-10-CM

## 2017-11-14 DIAGNOSIS — I73.9 PAD (PERIPHERAL ARTERY DISEASE) (HCC): Primary | ICD-10-CM

## 2017-11-14 DIAGNOSIS — Z98.61 S/P PTCA (PERCUTANEOUS TRANSLUMINAL CORONARY ANGIOPLASTY): Chronic | ICD-10-CM

## 2017-11-14 DIAGNOSIS — I10 ESSENTIAL HYPERTENSION: Chronic | ICD-10-CM

## 2017-11-14 DIAGNOSIS — I25.10 ATHEROSCLEROSIS OF NATIVE CORONARY ARTERY OF NATIVE HEART WITHOUT ANGINA PECTORIS: ICD-10-CM

## 2017-11-14 DIAGNOSIS — E78.2 MIXED HYPERLIPIDEMIA: Chronic | ICD-10-CM

## 2017-11-14 NOTE — PROGRESS NOTES
Chief Complaint   Patient presents with    Other     vascular consult-2 wk hospital f/u-legs feel better

## 2017-11-14 NOTE — PROGRESS NOTES
11/14/2017 3:13 PM      Subjective:     Doreen Uribe is here for f/u visit after undergoing right SFA intervention. Feels much better in legs. No LE symptoms now. She denies chest pain, chest pressure/discomfort, dyspnea, palpitations, irregular heart beats. Visit Vitals    /60 (BP 1 Location: Left arm, BP Patient Position: Sitting)    Pulse 68    Resp 16    Ht 5' 6\" (1.676 m)    Wt 152 lb 11.2 oz (69.3 kg)    SpO2 95%    BMI 24.65 kg/m2     Current Outpatient Prescriptions   Medication Sig    clopidogrel (PLAVIX) 75 mg tab TAKE ONE TABLET BY MOUTH ONCE DAILY    isosorbide mononitrate ER (IMDUR) 60 mg CR tablet TAKE ONE TABLET BY MOUTH ONCE DAILY    lovastatin (MEVACOR) 20 mg tablet Take 20 mg by mouth nightly.  nitroglycerin (NITROSTAT) 0.4 mg SL tablet 1 Tab by SubLINGual route every five (5) minutes as needed for Chest Pain.  metoprolol tartrate (LOPRESSOR) 25 mg tablet TAKE ONE-HALF TABLET BY MOUTH TWICE DAILY    magnesium oxide (MAG-OX) 400 mg tablet Take 400 mg by mouth daily.  calcium 500 mg tab Take 1 Tab by mouth daily.  oxybutynin (DITROPAN) 5 mg tablet Take 5 mg by mouth three (3) times daily.  lisinopril (PRINIVIL, ZESTRIL) 40 mg tablet Take 1 tablet by mouth daily.  insulin NPH (HUMULIN N) 100 unit/mL (3 mL) pen by SubCUTAneous route as needed (as needed with dinner). Sliding scale    cholecalciferol, vitamin D3, (VITAMIN D3) 4,000 unit cap Take  by mouth daily.  levothyroxine (SYNTHROID) 50 mcg tablet Take 50 mcg by mouth daily (before breakfast).  INSULIN LISPRO (HUMALOG SC) 6 Units by SubCUTAneous route three (3) times daily (with meals).  CINNAMON BARK (CINNAMON PO) Take 1,000 mg by mouth daily. No current facility-administered medications for this visit.           Objective:      Visit Vitals    /60 (BP 1 Location: Left arm, BP Patient Position: Sitting)    Pulse 68    Resp 16    Ht 5' 6\" (1.676 m)    Wt 152 lb 11.2 oz (69.3 kg)    SpO2 95%    BMI 24.65 kg/m2       Data Review:     Reviewed and/or ordered active problem list, medication list tests    Past Medical History:   Diagnosis Date    Arthritis     BILAT HANDS    CAD (coronary artery disease)     high cholesterol; heart murmur    Cancer (HCC)     uterine    Chronic kidney disease     [facility]     Endocrine disease     hypothyroidism    Hypertension     Neurological disorder     TIA    Other ill-defined conditions(799.89)     PVD; benign right breast tumor removed    Peripheral vascular angioplasty status 8/14/2013 8/14/13 s/p angioplasty L RFA    Stroke ([facility]     TIA  X3 NO RESIDUAL    Thyroid disease     HYPOTHYROID      Past Surgical History:   Procedure Laterality Date    BREAST SURGERY PROCEDURE UNLISTED      tumor removed    CARDIAC SURG PROCEDURE UNLIST      CARDIAC STENT  X2    HX ANKLE FRACTURE TX      pins and plates right ankle    HX GYN      hysterectomy    HX OTHER SURGICAL      fem pop bypass on left     Allergies   Allergen Reactions    Pcn [Penicillins] Anaphylaxis    Codeine Palpitations    Ivp Dye [Fd And C Blue No.1] Nausea and Vomiting     \"I was told in [place] that it would stop my kidney up if I got it again. \"      Family History   Problem Relation Age of Onset   [place] Cancer Mother     Hypertension Sister     Hypertension Sister     Heart Disease Brother 72    Stroke Sister      x2 sisters    Diabetes Father     Cancer Father       Social History     Social History    Marital status:      Spouse name: N/A    Number of children: 11    Years of education: N/A     Occupational History     Not Employed     Social History Main Topics    Smoking status: Former Smoker     Packs/day: 5.00     Years: 30.00     Quit date: 9/1/2000    Smokeless tobacco: Never Used    Alcohol use No    Drug use: No    Sexual activity: Not on file     Other Topics Concern    Not on file     Social History Narrative Review of Systems     General: Not Present- Anorexia, Chills, Dietary Changes, Fever, Medication Changes, Night Sweats, Weight Gain > 10lbs. and Weight Loss > 10lbs. .  Skin: Not Present- Bruising and Excessive Sweating. HEENT: Not Present- Headache, Visual Loss and Vertigo. Respiratory: Not Present- Cough, Decreased Exercise Tolerance, Difficulty Breathing, Snoring and Wheezing. Cardiovascular: Not Present- Abnormal Blood Pressure, Chest Pain, Claudications, Difficulty Breathing On Exertion, Edema, Fainting / Blacking Out, Irregular Heart Beat, Night Cramps, Orthopnea, Palpitations, Paroxysmal Nocturnal Dyspnea, Rapid Heart Rate, Shortness of Breath and Swelling of Extremities. Gastrointestinal: Not Present- Black, Tarry Stool, Bloody Stool, Diarrhea, Hematemesis, Rectal Bleeding and Vomiting. Musculoskeletal: Not Present- Muscle Pain and Muscle Weakness. Neurological: Not Present- Dizziness. Psychiatric: Not Present- Depression. Endocrine: Not Present- Cold Intolerance, Heat Intolerance and Thyroid Problems. Hematology: Not Present- Abnormal Bleeding, Anemia, Blood Clots and Easy Bruising.       Physical Exam   The physical exam findings are as follows:       General   Mental Status - Alert. General Appearance - Not in acute distress. Chest and Lung Exam   Inspection: Accessory muscles - No use of accessory muscles in breathing. Auscultation:   Breath sounds: - Normal.      Cardiovascular   Inspection: Jugular vein - Bilateral - Inspection Normal.  Palpation/Percussion:   Apical Impulse: - Normal.  Auscultation: Rhythm - Regular. Heart Sounds - S1 WNL and S2 WNL. No S3 or S4. Murmurs & Other Heart Sounds: Auscultation of the heart reveals - No Murmurs. Carotid arteries - No Carotid bruit. Peripheral Vascular   Upper Extremity: Inspection - Bilateral - No Cyanotic nailbeds or Digital clubbing.   Lower Extremity:   Palpation: Dorsalis pedis pulse - Bilateral - weak Posterior tibia pulse - Bilateral - weak. Edema - Bilateral - No edema. Assessment:       ICD-10-CM ICD-9-CM    1. PAD (peripheral artery disease) ([facility]) I73.9 443.9    2. Atherosclerosis of native coronary artery of native heart without angina pectoris I25.10 414.01    3. Mixed hyperlipidemia E78.2 272.2    4. Essential hypertension I10 401.9    5. S/P PTCA (percutaneous transluminal coronary angioplasty) Z98.61 V45.82    6. Peripheral vascular angioplasty status Z98.62 V45.89        Plan:     ATHEROSCLEROSIS OF NATIVE ARTERIES OF THE EXTREMITIES WITH INTERMITTENT CLAUDICATION    S/p right SFA atherectomy and DCB PTA. Now feels much better. Continue current meds. S/p b/l iliac stents 7/2014.    s/p left SFA laser athetectomy and stent 9/2011  s/p left SFA angioplasty for ISR 1/2012.   s/p left SFA redo laser atherectomy and PTA 10/2012  S/p left SFA angioplasty 8/2013      CAD s/p LCX ARCHANA 1/2012. S/p re intervention 10/2012. S/p RCA  PCI 09/2016. Stable.    Continue current meds.       HYPERLIPIDEMIA, MIXED    On statin.        BENIGN ESSENTIAL HYPERTENSION    Controlled.

## 2017-11-14 NOTE — MR AVS SNAPSHOT
Visit Information Date & Time Provider Department Dept. Phone Encounter #  
 11/14/2017  3:15 PM Aakash Roca, [address] [facility] [phone] [id number] Follow-up Instructions Return in about 6 months (around 5/14/2018). Follow-up and Disposition History Your Appointments 3/6/2018  9:45 AM  
6 MONTH with Aakash Roca MD  
[facility] Appt Note: Dr. Shelley [address]  
[phone] [address] [facility] Upcoming Health Maintenance Date Due Hepatitis C Screening 1950 FOOT EXAM Q1 7/29/1961 EYE EXAM RETINAL OR DILATED Q1 7/29/1961 DTaP/Tdap/Td series (1 - Tdap) 7/29/1972 BREAST CANCER SCRN MAMMOGRAM 7/29/2001 FOBT Q 1 YEAR AGE 50-75 7/29/2001 MICROALBUMIN Q1 6/2/2010 ZOSTER VACCINE AGE 60> 5/29/2011 GLAUCOMA SCREENING Q2Y 7/29/2016 OSTEOPOROSIS SCREENING (DEXA) 7/29/2016 Pneumococcal 65+ Low/Medium Risk (1 of 2 - PCV13) 7/29/2016 MEDICARE YEARLY EXAM 7/29/2016 HEMOGLOBIN A1C Q6M 8/29/2016 LIPID PANEL Q1 2/28/2017 Influenza Age 5 to Adult 8/1/2017 Allergies as of 11/14/2017  Review Complete On: 11/14/2017 By: Aakash Roca MD  
  
 Severity Noted Reaction Type Reactions Pcn [Penicillins] High 05/06/2010   Systemic Anaphylaxis Codeine  05/06/2010    Palpitations Ivp Dye [Fd And C Blue No.1]  05/06/2010    Nausea and Vomiting \"I was told in [place] that it would stop my kidney up if I got it again. \" Current Immunizations  Reviewed on 6/11/2014 Name Date ZZZ-RETIRED (DO NOT USE) Pneumococcal Vaccine (Unspecified Type)  Deferred (Patient Refused) Not reviewed this visit You Were Diagnosed With   
  
 Codes Comments PAD (peripheral artery disease) (HCC)    -  Primary ICD-10-CM: I73.9 ICD-9-CM: 443.9 Atherosclerosis of native coronary artery of native heart without angina pectoris     ICD-10-CM: I25.10 ICD-9-CM: 414.01 Mixed hyperlipidemia     ICD-10-CM: E78.2 ICD-9-CM: 272.2 Essential hypertension     ICD-10-CM: I10 
ICD-9-CM: 401.9 S/P PTCA (percutaneous transluminal coronary angioplasty)     ICD-10-CM: Z98.61 ICD-9-CM: V45.82 Peripheral vascular angioplasty status     ICD-10-CM: Z98.62 
ICD-9-CM: V45.89 Vitals BP Pulse Resp Height(growth percentile) Weight(growth percentile) SpO2  
 150/60 (BP 1 Location: Left arm, BP Patient Position: Sitting) 68 16 5' 6\" (1.676 m) 152 lb 11.2 oz (69.3 kg) 95% BMI OB Status Smoking Status 24.65 kg/m2 Hysterectomy Former Smoker Vitals History BMI and BSA Data Body Mass Index Body Surface Area  
 24.65 kg/m 2 1.8 m 2 Preferred Pharmacy Pharmacy Name Phone [facility] [address] [phone] Your Updated Medication List  
  
   
This list is accurate as of: 11/14/17  3:19 PM.  Always use your most recent med list.  
  
  
  
  
 calcium 500 mg Tab Take 1 Tab by mouth daily. CINNAMON PO Take 1,000 mg by mouth daily. clopidogrel 75 mg Tab Commonly known as:  PLAVIX TAKE ONE TABLET BY MOUTH ONCE DAILY HUMALOG SC  
6 Units by SubCUTAneous route three (3) times daily (with meals). insulin  unit/mL (3 mL) Inpn Commonly known as:  HUMULIN N  
by SubCUTAneous route as needed (as needed with dinner). Sliding scale  
  
 isosorbide mononitrate ER 60 mg CR tablet Commonly known as:  IMDUR  
TAKE ONE TABLET BY MOUTH ONCE DAILY  
  
 levothyroxine 50 mcg tablet Commonly known as:  SYNTHROID Take 50 mcg by mouth daily (before breakfast). lisinopril 40 mg tablet Commonly known as:  Farideh Primmer Take 1 tablet by mouth daily. lovastatin 20 mg tablet Commonly known as:  MEVACOR  
 Take 20 mg by mouth nightly.  
  
 magnesium oxide 400 mg tablet Commonly known as:  MAG-OX Take 400 mg by mouth daily. metoprolol tartrate 25 mg tablet Commonly known as:  LOPRESSOR  
TAKE ONE-HALF TABLET BY MOUTH TWICE DAILY  
  
 nitroglycerin 0.4 mg SL tablet Commonly known as:  NITROSTAT  
1 Tab by SubLINGual route every five (5) minutes as needed for Chest Pain. oxybutynin 5 mg tablet Commonly known as:  NATMSXEY Take 5 mg by mouth three (3) times daily. VITAMIN D3 4,000 unit Cap Generic drug:  cholecalciferol (vitamin D3) Take  by mouth daily. Follow-up Instructions Return in about 6 months (around 5/14/2018). Introducing [facility] SERVICES! Dear Epifania Hashimoto: Thank you for requesting a [employer] account. Our records indicate that you already have an active [employer] account. You can access your account anytime at [email or web address] Did you know that you can access your hospital and ER discharge instructions at any time in [employer]? You can also review all of your test results from your hospital stay or ER visit. Additional Information If you have questions, please visit the Frequently Asked Questions section of the [employer] website at [email or web address] Remember, [employer] is NOT to be used for urgent needs. For medical emergencies, dial 911. Now available from your iPhone and Android! Please provide this summary of care documentation to your next provider. Your primary care clinician is listed as Yasmine Vega. If you have any questions after today's visit, please call [phone].

## 2017-11-30 RX ORDER — CLOPIDOGREL BISULFATE 75 MG/1
TABLET ORAL
Qty: 60 TAB | Refills: 0 | Status: SHIPPED | OUTPATIENT
Start: 2017-11-30 | End: 2018-01-13 | Stop reason: SDUPTHER

## 2018-01-14 RX ORDER — CLOPIDOGREL BISULFATE 75 MG/1
TABLET ORAL
Qty: 60 TAB | Refills: 0 | Status: SHIPPED | OUTPATIENT
Start: 2018-01-14 | End: 2018-04-04 | Stop reason: SDUPTHER

## 2018-01-22 RX ORDER — ISOSORBIDE MONONITRATE 60 MG/1
TABLET, EXTENDED RELEASE ORAL
Qty: 60 TAB | Refills: 0 | Status: SHIPPED | OUTPATIENT
Start: 2018-01-22 | End: 2018-03-26 | Stop reason: SDUPTHER

## 2018-03-26 RX ORDER — ISOSORBIDE MONONITRATE 60 MG/1
TABLET, EXTENDED RELEASE ORAL
Qty: 60 TAB | Refills: 0 | Status: SHIPPED | OUTPATIENT
Start: 2018-03-26 | End: 2018-05-15 | Stop reason: SDUPTHER

## 2018-04-03 ENCOUNTER — TELEPHONE (OUTPATIENT)
Dept: CARDIOLOGY CLINIC | Age: 68
End: 2018-04-03

## 2018-04-03 NOTE — TELEPHONE ENCOUNTER
Returned  Liz Hurley call, on HIPPA form, and left message advising I do not know what pharmacy to send it to-as [facility] closed. Advised to call back so I can send in refill. Received fax request from [employer] on [facility] and updated the pharmacy in our system. Sent the refill for Plavix to NP for approval and to be sent to pharmacy. Called pt,verified pt with two pt identifiers, told pt that I had received the fax from [facility] and sent to NP for approval. Advised that she sent to the pharmacy and for her to call the pharmacy as to when it will be ready. She verbalized that she understood everything.

## 2018-04-03 NOTE — TELEPHONE ENCOUNTER
Pt's  called about her Plavix. He went to [facility] to pick it up but its not there. Pt is completely out of meds  Please call pt to advise when submited. Pharmacy faxed request yesterday.      Thanks      Willy Mcneal

## 2018-04-04 RX ORDER — CLOPIDOGREL BISULFATE 75 MG/1
TABLET ORAL
Qty: 90 TAB | Refills: 0 | Status: SHIPPED | OUTPATIENT
Start: 2018-04-04 | End: 2018-06-29 | Stop reason: SDUPTHER

## 2018-05-15 ENCOUNTER — OFFICE VISIT (OUTPATIENT)
Dept: CARDIOLOGY CLINIC | Age: 68
End: 2018-05-15

## 2018-05-15 VITALS
DIASTOLIC BLOOD PRESSURE: 56 MMHG | BODY MASS INDEX: 24.28 KG/M2 | HEART RATE: 78 BPM | OXYGEN SATURATION: 95 % | HEIGHT: 66 IN | RESPIRATION RATE: 16 BRPM | SYSTOLIC BLOOD PRESSURE: 118 MMHG | WEIGHT: 151.1 LBS

## 2018-05-15 DIAGNOSIS — Z98.61 S/P PTCA (PERCUTANEOUS TRANSLUMINAL CORONARY ANGIOPLASTY): Chronic | ICD-10-CM

## 2018-05-15 DIAGNOSIS — E78.5 HYPERLIPIDEMIA, UNSPECIFIED HYPERLIPIDEMIA TYPE: Primary | Chronic | ICD-10-CM

## 2018-05-15 DIAGNOSIS — I10 ESSENTIAL HYPERTENSION: Chronic | ICD-10-CM

## 2018-05-15 DIAGNOSIS — I25.10 ATHEROSCLEROSIS OF NATIVE CORONARY ARTERY OF NATIVE HEART WITHOUT ANGINA PECTORIS: ICD-10-CM

## 2018-05-15 DIAGNOSIS — I73.9 PAD (PERIPHERAL ARTERY DISEASE) (HCC): ICD-10-CM

## 2018-05-15 DIAGNOSIS — Z95.5 S/P CORONARY ARTERY STENT PLACEMENT: ICD-10-CM

## 2018-05-15 RX ORDER — INSULIN ASPART 100 [IU]/ML
INJECTION, SOLUTION INTRAVENOUS; SUBCUTANEOUS
Refills: 3 | COMMUNITY
Start: 2018-03-14 | End: 2019-10-08 | Stop reason: ALTCHOICE

## 2018-05-15 RX ORDER — LISINOPRIL 20 MG/1
TABLET ORAL DAILY
COMMUNITY
Start: 2018-05-09 | End: 2019-12-23

## 2018-05-15 RX ORDER — TROSPIUM CHLORIDE ER 60 MG/1
CAPSULE ORAL DAILY
COMMUNITY
Start: 2018-05-10 | End: 2019-10-08

## 2018-05-15 NOTE — PROGRESS NOTES
1. Have you been to the ER, urgent care clinic since your last visit? Hospitalized since your last visit? No    2. Have you seen or consulted any other health care providers outside of the [address] [place] [address] since your last visit? Include any pap smears or colon screening.  No    Chief Complaint   Patient presents with    Cholesterol Problem     6 mo f/u    Hypertension     \"    Rapid Heart Rate     pt reports occasional fast heart rate

## 2018-05-15 NOTE — PROGRESS NOTES
5/15/2018 9:35 AM      Subjective:     Eren Zaidi is here for f/u visit. She denies chest pain, chest pressure/discomfort, dyspnea, palpitations, irregular heart beats, near-syncope, syncope, fatigue, orthopnea, paroxysmal nocturnal dyspnea, exertional chest pressure/discomfort, claudication, lower extremity edema, tachypnea. Visit Vitals    /56 (BP 1 Location: Left arm, BP Patient Position: Sitting)    Pulse 78    Resp 16    Ht 5' 6\" (1.676 m)    Wt 151 lb 1.6 oz (68.5 kg)    SpO2 95%    BMI 24.39 kg/m2     Current Outpatient Prescriptions   Medication Sig    NOVOLOG FLEXPEN U-100 INSULIN 100 unit/mL inpn 10 units once a day    trospium (SANCTURA XL) 60 mg capsule     lisinopril (PRINIVIL, ZESTRIL) 20 mg tablet     clopidogrel (PLAVIX) 75 mg tab TAKE ONE TABLET BY MOUTH ONCE DAILY    isosorbide mononitrate ER (IMDUR) 60 mg CR tablet TAKE ONE TABLET BY MOUTH ONCE DAILY    lovastatin (MEVACOR) 20 mg tablet Take 20 mg by mouth nightly.  nitroglycerin (NITROSTAT) 0.4 mg SL tablet 1 Tab by SubLINGual route every five (5) minutes as needed for Chest Pain.  metoprolol tartrate (LOPRESSOR) 25 mg tablet TAKE ONE-HALF TABLET BY MOUTH TWICE DAILY    magnesium oxide (MAG-OX) 400 mg tablet Take 400 mg by mouth daily.  calcium 500 mg tab Take 1 Tab by mouth daily.  oxybutynin (DITROPAN) 5 mg tablet Take 5 mg by mouth three (3) times daily.  insulin NPH (HUMULIN N) 100 unit/mL (3 mL) pen by SubCUTAneous route as needed (as needed with dinner). Sliding scale    cholecalciferol, vitamin D3, (VITAMIN D3) 4,000 unit cap Take  by mouth daily.  levothyroxine (SYNTHROID) 50 mcg tablet Take 50 mcg by mouth daily (before breakfast).  CINNAMON BARK (CINNAMON PO) Take 1,000 mg by mouth daily. No current facility-administered medications for this visit.           Objective:      Visit Vitals    /56 (BP 1 Location: Left arm, BP Patient Position: Sitting)    Pulse 78    Resp 16    Ht 5' 6\" (1.676 m)    Wt 151 lb 1.6 oz (68.5 kg)    SpO2 95%    BMI 24.39 kg/m2       Data Review:     EKG: Normal sinus rhythm, no acute st/t changes    Reviewed and/or ordered active problem list, medication list tests    Past Medical History:   Diagnosis Date    Arthritis     BILAT HANDS    CAD (coronary artery disease)     high cholesterol; heart murmur    Cancer (HCC)     uterine    Chronic kidney disease     Diabetes ([address]     Endocrine disease     hypothyroidism    Hypertension     Neurological disorder     TIA    Other ill-defined conditions(799.89)     PVD; benign right breast tumor removed    Peripheral vascular angioplasty status 8/14/2013 8/14/13 s/p angioplasty L RFA    Stroke ([address]     TIA  X3 NO RESIDUAL    Thyroid disease     HYPOTHYROID      Past Surgical History:   Procedure Laterality Date    BREAST SURGERY PROCEDURE UNLISTED      tumor removed    CARDIAC SURG PROCEDURE UNLIST      CARDIAC STENT  X2    HX ANKLE FRACTURE TX      pins and plates right ankle    HX GYN      hysterectomy    HX OTHER SURGICAL      fem pop bypass on left     Allergies   Allergen Reactions    Pcn [Penicillins] Anaphylaxis    Codeine Palpitations    Ivp Dye [Fd And C Blue No.1] Nausea and Vomiting     \"I was told in [place] that it would stop my kidney up if I got it again. \"      Family History   Problem Relation Age of Onset    Cancer Mother     Hypertension Sister     Hypertension Sister     Heart Disease Brother 72    Stroke Sister      x2 sisters    Diabetes Father     Cancer Father       Social History     Social History    Marital status:      Spouse name: N/A    Number of children: 11    Years of education: N/A     Occupational History     Not Employed     Social History Main Topics    Smoking status: Former Smoker     Packs/day: 5.00     Years: 30.00     Quit date: 9/1/2000    Smokeless tobacco: Never Used    Alcohol use No    Drug use: No    Sexual activity: Not on file     Other Topics Concern    Not on file     Social History Narrative         Review of Systems     General: Not Present- Anorexia, Chills, Dietary Changes, Fever, Medication Changes, Night Sweats, Weight Gain > 10lbs. and Weight Loss > 10lbs. .  Skin: Not Present- Bruising and Excessive Sweating. HEENT: Not Present- Headache, Visual Loss and Vertigo. Respiratory: Not Present- Cough, Decreased Exercise Tolerance, Difficulty Breathing, Snoring and Wheezing. Cardiovascular: Not Present- Abnormal Blood Pressure, Chest Pain, Claudications, Difficulty Breathing On Exertion, Edema, Fainting / Blacking Out, Irregular Heart Beat, Night Cramps, Orthopnea, Palpitations, Paroxysmal Nocturnal Dyspnea, Rapid Heart Rate, Shortness of Breath and Swelling of Extremities. Gastrointestinal: Not Present- Black, Tarry Stool, Bloody Stool, Diarrhea, Hematemesis, Rectal Bleeding and Vomiting. Musculoskeletal: Not Present- Muscle Pain and Muscle Weakness. Neurological: Not Present- Dizziness. Psychiatric: Not Present- Depression. Endocrine: Not Present- Cold Intolerance, Heat Intolerance and Thyroid Problems. Hematology: Not Present- Abnormal Bleeding, Anemia, Blood Clots and Easy Bruising.       Physical Exam   The physical exam findings are as follows:       General   Mental Status - Alert. General Appearance - Not in acute distress. Chest and Lung Exam   Inspection: Accessory muscles - No use of accessory muscles in breathing. Auscultation:   Breath sounds: - Normal.      Cardiovascular   Inspection: Jugular vein - Bilateral - Inspection Normal.  Palpation/Percussion:   Apical Impulse: - Normal.  Auscultation: Rhythm - Regular. Heart Sounds - S1 WNL and S2 WNL. No S3 or S4. Murmurs & Other Heart Sounds: Auscultation of the heart reveals - No Murmurs. Carotid arteries - No Carotid bruit.       Peripheral Vascular   Upper Extremity: Inspection - Bilateral - No Cyanotic nailbeds or Digital clubbing. Lower Extremity:   Palpation: Dorsalis pedis pulse - Bilateral - weak. Posterior tibia pulse - Bilateral - weak. Edema - Bilateral - No edema. Assessment:       ICD-10-CM ICD-9-CM    1. Hyperlipidemia, unspecified hyperlipidemia type E78.5 272.4 AMB POC EKG ROUTINE W/ 12 LEADS, INTER & REP   2. Essential hypertension I10 401.9    3. Atherosclerosis of native coronary artery of native heart without angina pectoris I25.10 414.01    4. S/P PTCA (percutaneous transluminal coronary angioplasty) Z98.61 V45.82    5. PAD (peripheral artery disease) ([facility]) I73.9 443.9    6. S/P coronary artery stent placement Z95.5 V45.82        Plan:     ATHEROSCLEROSIS OF NATIVE ARTERIES OF THE EXTREMITIES WITH INTERMITTENT CLAUDICATION    S/p right SFA atherectomy and DCB PTA 10/2017. stable. Continue current meds. S/p b/l iliac stents 7/2014.    s/p left SFA laser athetectomy and stent 9/2011  s/p left SFA angioplasty for ISR 1/2012.   s/p left SFA redo laser atherectomy and PTA 10/2012  S/p left SFA angioplasty 8/2013      CAD s/p LCX ARCHANA 1/2012. S/p re intervention 10/2012. S/p RCA  PCI 09/2016. Stable. Continue current meds.       HYPERLIPIDEMIA, MIXED    On statin. Will get copy of recent labs from endo office.       BENIGN ESSENTIAL HYPERTENSION    Controlled.

## 2018-05-15 NOTE — MR AVS SNAPSHOT
[address] [facility]. 
[phone] Patient: Jermaine Lawler MRN: [id number] QMP:1/99/8005 Visit Information Date & Time Provider Department Dept. Phone Encounter #  
 5/15/2018  9:00 AM Fabien Ceballos [facility] [phone] [id number] Follow-up Instructions Return in about 6 months (around 11/15/2018). Upcoming Health Maintenance Date Due Hepatitis C Screening 1950 FOOT EXAM Q1 7/29/1961 EYE EXAM RETINAL OR DILATED Q1 7/29/1961 DTaP/Tdap/Td series (1 - Tdap) 7/29/1972 BREAST CANCER SCRN MAMMOGRAM 7/29/2001 FOBT Q 1 YEAR AGE 50-75 7/29/2001 MICROALBUMIN Q1 6/2/2010 ZOSTER VACCINE AGE 60> 5/29/2011 GLAUCOMA SCREENING Q2Y 7/29/2016 Bone Densitometry (Dexa) Screening 7/29/2016 Pneumococcal 65+ Low/Medium Risk (1 of 2 - PCV13) 7/29/2016 HEMOGLOBIN A1C Q6M 8/29/2016 LIPID PANEL Q1 2/28/2017 MEDICARE YEARLY EXAM 3/14/2018 Influenza Age 5 to Adult 8/1/2018 Allergies as of 5/15/2018  Review Complete On: 5/15/2018 By: Dorian Bob MD  
  
 Severity Noted Reaction Type Reactions Pcn [Penicillins] High 05/06/2010   Systemic Anaphylaxis Codeine  05/06/2010    Palpitations Ivp Dye [Fd And C Blue No.1]  05/06/2010    Nausea and Vomiting \"I was told in [place] that it would stop my kidney up if I got it again. \" Current Immunizations  Reviewed on 6/11/2014 Name Date ZZZ-RETIRED (DO NOT USE) Pneumococcal Vaccine (Unspecified Type)  Deferred (Patient Refused) Not reviewed this visit You Were Diagnosed With   
  
 Codes Comments Hyperlipidemia, unspecified hyperlipidemia type    -  Primary ICD-10-CM: E78.5 ICD-9-CM: 272.4 Essential hypertension     ICD-10-CM: I10 
ICD-9-CM: 401.9 Atherosclerosis of native coronary artery of native heart without angina pectoris     ICD-10-CM: I25.10 ICD-9-CM: 414.01 S/P PTCA (percutaneous transluminal coronary angioplasty)     ICD-10-CM: Z98.61 ICD-9-CM: V45.82 PAD (peripheral artery disease) ([facility])     ICD-10-CM: I73.9 ICD-9-CM: 443.9 S/P coronary artery stent placement     ICD-10-CM: Z95.5 ICD-9-CM: V45.82 Vitals BP Pulse Resp Height(growth percentile) Weight(growth percentile) SpO2  
 118/56 (BP 1 Location: Left arm, BP Patient Position: Sitting) 78 16 5' 6\" (1.676 m) 151 lb 1.6 oz (68.5 kg) 95% BMI OB Status Smoking Status 24.39 kg/m2 Hysterectomy Former Smoker Vitals History BMI and BSA Data Body Mass Index Body Surface Area  
 24.39 kg/m 2 1.79 m 2 Preferred Pharmacy Pharmacy Name Phone [facility] [phone] Your Updated Medication List  
  
   
This list is accurate as of 5/15/18  9:36 AM.  Always use your most recent med list.  
  
  
  
  
 calcium 500 mg Tab Take 1 Tab by mouth daily. CINNAMON PO Take 1,000 mg by mouth daily. clopidogrel 75 mg Tab Commonly known as:  PLAVIX TAKE ONE TABLET BY MOUTH ONCE DAILY  
  
 insulin  unit/mL (3 mL) Inpn Commonly known as:  HUMULIN N  
by SubCUTAneous route as needed (as needed with dinner). Sliding scale  
  
 isosorbide mononitrate ER 60 mg CR tablet Commonly known as:  IMDUR  
TAKE ONE TABLET BY MOUTH ONCE DAILY  
  
 levothyroxine 50 mcg tablet Commonly known as:  SYNTHROID Take 50 mcg by mouth daily (before breakfast). lisinopril 20 mg tablet Commonly known as:  PRINIVIL, ZESTRIL  
  
 lovastatin 20 mg tablet Commonly known as:  MEVACOR Take 20 mg by mouth nightly.  
  
 magnesium oxide 400 mg tablet Commonly known as:  MAG-OX Take 400 mg by mouth daily. metoprolol tartrate 25 mg tablet Commonly known as:  LOPRESSOR  
TAKE ONE-HALF TABLET BY MOUTH TWICE DAILY  
  
 nitroglycerin 0.4 mg SL tablet Commonly known as:  NITROSTAT  
1 Tab by SubLINGual route every five (5) minutes as needed for Chest Pain. NovoLOG Flexpen U-100 Insulin 100 unit/mL Inpn Generic drug:  insulin aspart U-100  
10 units once a day  
  
 oxybutynin 5 mg tablet Commonly known as:  MYUPHVPG Take 5 mg by mouth three (3) times daily. trospium 60 mg capsule Commonly known as:  SANCTURA XL  
  
 VITAMIN D3 4,000 unit Cap Generic drug:  cholecalciferol (vitamin D3) Take  by mouth daily. We Performed the Following AMB POC EKG ROUTINE W/ 12 LEADS, INTER & REP [82556 CPT(R)] Follow-up Instructions Return in about 6 months (around 11/15/2018). Introducing [facility]! Dear Sherry Wheeler: Thank you for requesting a [employer] account. Our records indicate that you already have an active [employer] account. You can access your account anytime at [email or web address] Did you know that you can access your hospital and ER discharge instructions at any time in [employer]? You can also review all of your test results from your hospital stay or ER visit. Additional Information If you have questions, please visit the Frequently Asked Questions section of the [employer] website at [email or web address] Remember, [employer] is NOT to be used for urgent needs. For medical emergencies, dial 911. Now available from your iPhone and Android! Please provide this summary of care documentation to your next provider. Your primary care clinician is listed as Aravind Banks. If you have any questions after today's visit, please call [phone].

## 2018-05-17 ENCOUNTER — TELEPHONE (OUTPATIENT)
Dept: CARDIOLOGY CLINIC | Age: 68
End: 2018-05-17

## 2018-05-17 NOTE — TELEPHONE ENCOUNTER
----- Message from Jason Stearns MD sent at 5/15/2018  9:36 AM EDT -----  Can you get copy of her recent labs from her Endo Dr. Chucho Ashton office. Called  office to get recent labs faxed to our office. She advised that she would get that faxed over to me. Received labs from [facility] office and placed in 's mail box for review.

## 2018-05-25 ENCOUNTER — TELEPHONE (OUTPATIENT)
Dept: CARDIOLOGY CLINIC | Age: 68
End: 2018-05-25

## 2018-05-25 RX ORDER — ISOSORBIDE MONONITRATE 60 MG/1
TABLET, EXTENDED RELEASE ORAL
Qty: 60 TAB | Refills: 0 | Status: SHIPPED | OUTPATIENT
Start: 2018-05-25 | End: 2018-07-25 | Stop reason: SDUPTHER

## 2018-05-25 NOTE — TELEPHONE ENCOUNTER
----- Message from Silvina Hernandez MD sent at 5/22/2018 12:58 PM EDT -----  I have reviewed labs. Check with her if her statin dose was adjusted after recent blood work. LDL is still elevated. If no changes were made then we may have to add zetia    Called pt,verified pt with two pt identifiers, told pt that  reviewed her recent labs from 's office and her LDL was elevated. Pt advised that she has not followed up with  yet. She advised she has an appt in June. Pt advised she would rather f/u with  before adding or changing her medication. Pt wanted to know if she should double up on her cholesterol medication until then, advised pt DO NOT DOUBLE UP ON ANY MEDICATION. Pt advised she would call back and let us know what  says in regards to medications. She verbalized that she understood everything.

## 2018-07-02 RX ORDER — CLOPIDOGREL BISULFATE 75 MG/1
TABLET ORAL
Qty: 90 TAB | Refills: 0 | Status: SHIPPED | OUTPATIENT
Start: 2018-07-02 | End: 2018-10-02 | Stop reason: SDUPTHER

## 2018-07-30 RX ORDER — ISOSORBIDE MONONITRATE 60 MG/1
TABLET, EXTENDED RELEASE ORAL
Qty: 60 TAB | Refills: 0 | Status: SHIPPED | OUTPATIENT
Start: 2018-07-30 | End: 2018-10-02 | Stop reason: SDUPTHER

## 2018-10-02 RX ORDER — ISOSORBIDE MONONITRATE 60 MG/1
TABLET, EXTENDED RELEASE ORAL
Qty: 60 TAB | Refills: 0 | Status: SHIPPED | OUTPATIENT
Start: 2018-10-02 | End: 2018-11-13 | Stop reason: SDUPTHER

## 2018-11-13 ENCOUNTER — OFFICE VISIT (OUTPATIENT)
Dept: CARDIOLOGY CLINIC | Age: 68
End: 2018-11-13

## 2018-11-13 VITALS
SYSTOLIC BLOOD PRESSURE: 140 MMHG | DIASTOLIC BLOOD PRESSURE: 58 MMHG | HEART RATE: 85 BPM | WEIGHT: 153 LBS | OXYGEN SATURATION: 95 % | HEIGHT: 66 IN | BODY MASS INDEX: 24.59 KG/M2

## 2018-11-13 DIAGNOSIS — Z98.62 PERIPHERAL VASCULAR ANGIOPLASTY STATUS: ICD-10-CM

## 2018-11-13 DIAGNOSIS — E78.2 MIXED HYPERLIPIDEMIA: ICD-10-CM

## 2018-11-13 DIAGNOSIS — I10 ESSENTIAL HYPERTENSION: Chronic | ICD-10-CM

## 2018-11-13 DIAGNOSIS — I25.10 ATHEROSCLEROSIS OF NATIVE CORONARY ARTERY OF NATIVE HEART WITHOUT ANGINA PECTORIS: ICD-10-CM

## 2018-11-13 DIAGNOSIS — Z98.61 S/P PTCA (PERCUTANEOUS TRANSLUMINAL CORONARY ANGIOPLASTY): ICD-10-CM

## 2018-11-13 DIAGNOSIS — Z95.5 S/P CORONARY ARTERY STENT PLACEMENT: ICD-10-CM

## 2018-11-13 DIAGNOSIS — I73.9 PAD (PERIPHERAL ARTERY DISEASE) (HCC): ICD-10-CM

## 2018-11-13 DIAGNOSIS — I25.10 ASHD (ARTERIOSCLEROTIC HEART DISEASE): Primary | ICD-10-CM

## 2018-11-13 RX ORDER — INSULIN LISPRO 100 [IU]/ML
INJECTION, SOLUTION INTRAVENOUS; SUBCUTANEOUS
COMMUNITY

## 2018-11-13 RX ORDER — GABAPENTIN 300 MG/1
300 CAPSULE ORAL 3 TIMES DAILY
COMMUNITY
Start: 2018-11-01

## 2018-11-13 NOTE — PROGRESS NOTES
Cade Shin DNP, ANP-BC  Subjective/HPI:     Annalise Cespedes is a 80 y.o. female is here for routine f/u. She reports after prolonged amounts of walking she has neuropathic diabetic peripheral neuropathy discomfort in her legs, denies burning or cramping sensations. Denies dyspnea on exertion or exertional chest pain. PCP Provider  Mer Ceron MD  Past Medical History:   Diagnosis Date    Arthritis     BILAT HANDS    CAD (coronary artery disease)     high cholesterol; heart murmur    Cancer ([address]     uterine    Chronic kidney disease     Diabetes ([address]     Endocrine disease     hypothyroidism    Hypertension     Neurological disorder     TIA    Other ill-defined conditions(799.89)     PVD; benign right breast tumor removed    Peripheral vascular angioplasty status 8/14/2013 8/14/13 s/p angioplasty L RFA    Stroke ([address]     TIA  X3 NO RESIDUAL    Thyroid disease     HYPOTHYROID      Past Surgical History:   Procedure Laterality Date    BREAST SURGERY PROCEDURE UNLISTED      tumor removed    CARDIAC SURG PROCEDURE UNLIST      CARDIAC STENT  X2    HX ANKLE FRACTURE TX      pins and plates right ankle    HX GYN      hysterectomy    HX OTHER SURGICAL      fem pop bypass on left     Allergies   Allergen Reactions    Pcn [Penicillins] Anaphylaxis    Codeine Palpitations    Ivp Dye [Fd And C Blue No.1] Nausea and Vomiting     \"I was told in [place] that it would stop my kidney up if I got it again. \"      Family History   Problem Relation Age of Onset   [facility] Mother     Hypertension Sister     Hypertension Sister     Heart Disease Brother 72    Stroke Sister         x2 sisters    Diabetes Father     Cancer Father       Current Outpatient Medications   Medication Sig    gabapentin (NEURONTIN) 300 mg capsule two (2) times a day.  insulin lispro (HUMALOG U-100 INSULIN) 100 unit/mL injection by SubCUTAneous route.  INSULIN PUMP    clopidogrel (PLAVIX) 75 mg tab TAKE 1 TABLET BY MOUTH ONCE DAILY    trospium (SANCTURA XL) 60 mg capsule daily.  lisinopril (PRINIVIL, ZESTRIL) 20 mg tablet daily.  isosorbide mononitrate ER (IMDUR) 60 mg CR tablet TAKE ONE TABLET BY MOUTH ONCE DAILY    lovastatin (MEVACOR) 20 mg tablet Take 20 mg by mouth nightly.  nitroglycerin (NITROSTAT) 0.4 mg SL tablet 1 Tab by SubLINGual route every five (5) minutes as needed for Chest Pain.  metoprolol tartrate (LOPRESSOR) 25 mg tablet TAKE ONE-HALF TABLET BY MOUTH TWICE DAILY    magnesium oxide (MAG-OX) 400 mg tablet Take 400 mg by mouth daily.  calcium 500 mg tab Take 1 Tab by mouth daily.  oxybutynin (DITROPAN) 5 mg tablet Take 5 mg by mouth three (3) times daily.  cholecalciferol, vitamin D3, (VITAMIN D3) 4,000 unit cap Take  by mouth daily.  levothyroxine (SYNTHROID) 50 mcg tablet Take 50 mcg by mouth daily (before breakfast).  CINNAMON BARK (CINNAMON PO) Take 1,000 mg by mouth daily.  NOVOLOG FLEXPEN U-100 INSULIN 100 unit/mL inpn 10 units once a day    insulin NPH (HUMULIN N) 100 unit/mL (3 mL) pen by SubCUTAneous route as needed (as needed with dinner). Sliding scale     No current facility-administered medications for this visit.        Vitals:    11/13/18 0949 11/13/18 0956   BP: 144/60 140/58   Pulse: 85    SpO2: 95%    Weight: 153 lb (69.4 kg)    Height: 5' 6\" (1.676 m)      Social History     Socioeconomic History    Marital status:      Spouse name: Not on file    Number of children: 5    Years of education: Not on file    Highest education level: Not on file   Social Needs    Financial resource strain: Not on file    Food insecurity - worry: Not on file    Food insecurity - inability: Not on file   "[employer] needs - medical: Not on file   [employer] needs - non-medical: Not on file   Occupational History     Employer: NOT EMPLOYED   Tobacco Use    Smoking status: Former Smoker     Packs/day: 5.00     Years: 30.00     Pack years: 150.00     Last attempt to quit: 2000     Years since quittin.2    Smokeless tobacco: Never Used   Substance and Sexual Activity    Alcohol use: No     Alcohol/week: 0.0 oz    Drug use: No    Sexual activity: Not on file   Other Topics Concern    Not on file   Social History Narrative    Not on file       I have reviewed the nurses notes, vitals, problem list, allergy list, medical history, family, social history and medications. Review of Symptoms:    General: Pt denies excessive weight gain or loss. Pt is able to conduct ADL's  HEENT: Denies blurred vision, headaches, epistaxis and difficulty swallowing. Respiratory: Denies shortness of breath, SAHNI, wheezing or stridor. Cardiovascular: Denies precordial pain, palpitations, edema or PND  Gastrointestinal: Denies poor appetite, indigestion, abdominal pain or blood in stool  Musculoskeletal: Diffuse lower extremity pain  Neurologic: Denies tremor, + bilateral feet neuropathy  Skin: Denies rash, itching or texture change. Physical Exam:      General: Well developed, in no acute distress, cooperative and alert  HEENT: No carotid bruits, no JVD, trach is midline. Neck Supple, PEERL, EOM intact. Heart:  Normal S1/S2 negative S3 or S4. Regular, no murmur, gallop or rub.   Respiratory: Clear bilaterally x 4, no wheezing or rales  Abdomen:   Soft, non-tender, no masses, bowel sounds are active.   Extremities:  No edema, normal cap refill, no cyanosis, atraumatic. Neuro: A&Ox3, speech clear, gait stable. Skin: Skin color is normal. No rashes or lesions. Non diaphoretic  Vascular: Barely palpable right dorsalis pedis, no posterior tibialis on right.   Left no palpable dorsalis pedis or posterior tibialis pulse, +1 popliteal.    Cardiographics    ECG: Sinus rhythm  Results for orders placed or performed during the hospital encounter of 16   EKG, 12 LEAD, INITIAL   Result Value Ref Range    Ventricular Rate 59 BPM    Atrial Rate 59 BPM    P-R Interval 186 ms QRS Duration 98 ms    Q-T Interval 436 ms    QTC Calculation (Bezet) 431 ms    Calculated P Axis 72 degrees    Calculated R Axis 44 degrees    Calculated T Axis 70 degrees    Diagnosis       Sinus bradycardia  Minor nonspecific st&t changes , new  When compared with ECG of 19-SEP-2016 11:05,    Confirmed by Fady Hollis MD, Juan Lopez ([id number]) on 9/20/2016 4:48:21 PM           Cardiology Labs:  Lab Results   Component Value Date/Time    Cholesterol, total 172 02/29/2016 09:32 AM    HDL Cholesterol 74 02/29/2016 09:32 AM    LDL, calculated 83 02/29/2016 09:32 AM    Triglyceride 76 02/29/2016 09:32 AM    CHOL/HDL Ratio 1.9 09/07/2010 10:21 AM       Lab Results   Component Value Date/Time    Sodium 144 10/24/2017 03:41 PM    Potassium 5.2 10/24/2017 03:41 PM    Chloride 101 10/24/2017 03:41 PM    CO2 28 10/24/2017 03:41 PM    Anion gap 8 09/20/2016 04:41 AM    Glucose 143 (H) 10/24/2017 03:41 PM    BUN 21 10/24/2017 03:41 PM    Creatinine 1.01 (H) 10/24/2017 03:41 PM    BUN/Creatinine ratio 21 10/24/2017 03:41 PM    GFR est AA 67 10/24/2017 03:41 PM    GFR est non-AA 58 (L) 10/24/2017 03:41 PM    Calcium 9.4 10/24/2017 03:41 PM    Bilirubin, total 0.2 10/24/2017 03:41 PM    AST (SGOT) 20 10/24/2017 03:41 PM    Alk. phosphatase 84 10/24/2017 03:41 PM    Protein, total 6.7 10/24/2017 03:41 PM    Albumin 4.4 10/24/2017 03:41 PM    Globulin 3.5 08/14/2013 08:44 AM    A-G Ratio 1.9 10/24/2017 03:41 PM    ALT (SGPT) 11 10/24/2017 03:41 PM           Assessment:     Assessment:     Diagnoses and all orders for this visit:    1. ASHD (arteriosclerotic heart disease)    2. Essential hypertension  -     AMB POC EKG ROUTINE W/ 12 LEADS, INTER & REP    3. Peripheral vascular angioplasty status    4. S/P PTCA (percutaneous transluminal coronary angioplasty)    5. S/P coronary artery stent placement    6. Mixed hyperlipidemia        ICD-10-CM ICD-9-CM    1. ASHD (arteriosclerotic heart disease) I25.10 414.00    2.  Essential hypertension I10 401.9 AMB POC EKG ROUTINE W/ 12 LEADS, INTER & REP   3. Peripheral vascular angioplasty status Z98.62 V45.89    4. S/P PTCA (percutaneous transluminal coronary angioplasty) Z98.61 V45.82    5. S/P coronary artery stent placement Z95.5 V45.82    6. Mixed hyperlipidemia E78.2 272.2      Orders Placed This Encounter    AMB POC EKG ROUTINE W/ 12 LEADS, INTER & REP     Order Specific Question:   Reason for Exam:     Answer:   ROUTINE    gabapentin (NEURONTIN) 300 mg capsule     Sig: two (2) times a day.  insulin lispro (HUMALOG U-100 INSULIN) 100 unit/mL injection     Sig: by SubCUTAneous route. INSULIN PUMP        Plan:       ATHEROSCLEROSIS OF NATIVE ARTERIES OF THE EXTREMITIES WITH INTERMITTENT CLAUDICATION    S/p right SFA atherectomy and DCB PTA 10/2017. S/p b/l iliac stents 7/2014.    s/p left SFA laser athetectomy and stent 9/2011  s/p left SFA angioplasty for ISR 1/2012.   s/p left SFA redo laser atherectomy and PTA 10/2012  S/p left SFA angioplasty 8/2013      CAD s/p LCX ARCHANA 1/2012. S/p re intervention 10/2012. S/p RCA  PCI 09/2016. Stable. Continue current meds.       HYPERLIPIDEMIA, MIXED    On statin. We will get recent lipid panel drawn 2 weeks ago. Over the summer her LDL was 156 she will be a PSK 9 therapy candidate.       BENIGN ESSENTIAL HYPERTENSION    Controlled. Jamie Sawant NP       11/21/2018: Lipid panel from Endocrine reviewed. LDL 90 HDL 70. Normal CMP, normal CBC, A1c 8%    This note was created using voice recognition software. Despite editing, there may be syntax errors. Patient seen and examined by me with nurse practitioner. Heber Torres personally performed all components of the history, physical, and medical decision making and agree with the assessment and plan with minor modifications as noted. PVD: now having recurrent symptoms. Check VENECIA. Refer to rehab. D/w pt. F/u in 3 months. Due to easy bruising will continue with plavix and avoid aspirin/xarelto combo. Muriel Wright MD

## 2018-11-13 NOTE — PROGRESS NOTES
Chief Complaint   Patient presents with    Shortness of Breath     6 MO. F/U    Chest Pain     C/O LT. SIDED CHEST PAIN     1. Have you been to the ER, urgent care clinic since your last visit? Hospitalized since your last visit? NO    2. Have you seen or consulted any other health care providers outside of the [address] [place] [address] since your last visit? Include any pap smears or colon screening.  NO

## 2018-11-16 ENCOUNTER — TELEPHONE (OUTPATIENT)
Dept: CARDIOLOGY CLINIC | Age: 68
End: 2018-11-16

## 2018-11-16 NOTE — TELEPHONE ENCOUNTER
----- Message from Marciano Woody NP sent at 11/13/2018 10:33 AM EST -----  Regarding: Labs  Myesha, continue call endocrinology [facility] get her recent labs. Specifically lipid panel. Most likely will be starting her on [address] [place] OFFICE BUT THE OFFICE IS CLOSED. WILL TRY ON Monday. Called  office to get recent labs on pt faxed to us. She advised she would get that faxed over. Received recent labs from [facility]. Will give to Marciano Woody for review.

## 2018-11-20 ENCOUNTER — DOCUMENTATION ONLY (OUTPATIENT)
Dept: CARDIOLOGY CLINIC | Age: 68
End: 2018-11-20

## 2018-11-20 NOTE — PROGRESS NOTES
Received PAD order form from [facility], had  sign and faxed back to cardiac PAD rehab at [phone] and received fax confirmation.

## 2018-12-12 ENCOUNTER — HOSPITAL ENCOUNTER (OUTPATIENT)
Dept: CARDIAC REHAB | Age: 68
Discharge: HOME | End: 2018-12-12
Payer: MEDICARE

## 2018-12-12 VITALS — HEIGHT: 67 IN | WEIGHT: 154.8 LBS | BODY MASS INDEX: 24.3 KG/M2

## 2018-12-12 PROCEDURE — 93668 PERIPHERAL VASCULAR REHAB: CPT

## 2018-12-12 NOTE — CARDIO/PULMONARY
The six-item Vascular Quality of Life (VascuQoL-6) health-related quality of life (HRQoL) instrument      Underline the best answer:      Because of the poor circulation in my legs, the range of activities that I would have liked to do in the past two weeks has been    1. Severely limitedmost activities not done  2. Very limited  3. Very slightly limited  4. Not limited at [facility] done all the activities that I wanted to       During the past two weeks, my legs felt tired or weak    1. All of the time  2. Some of the time  3. A little of the time  4. None of the time     During the past two weeks, because of the poor circulation in my legs, my ability to walk has been    1. Totally limited, couldn't walk at all  2. Very limited  3. A little limited  4. Not at all limited     During the past two weeks, I have been concerned about having poor circulation in my legs    1. All of the time  2. Some of the time  3. A little of the time  4. None of the time    During the past two weeks, because of the poor circulation in my legs, my ability to participate in social activities has been  1. Totally limited, couldn't socialize at all  2. Very limited  3. A little limited  4. Not at all limited     During the past two weeks, when I have had pain in the leg (or foot) it has given me    1. A great deal of discomfort or distress  2. A moderate amount of discomfort or distress  3. Very little discomfort or distress  4.  No discomfort or distress    Total ____8_________

## 2018-12-12 NOTE — CARDIO/PULMONARY
Cardiopulmonary Rehab Orientation:      Patient is a 80year old patient of Dr. Darío Zambrano who presents to rehab for bilateral PAD. History includes CAD, PCI, prior upper leg stents,TIA, Type I Diabetes, angina, and dizziness. Pt's VS were as follows: BP on R arm 131/62,  L arm 135/63, HR 72, oxygen saturation 95% RA, Lungs are clear to auscultation. Pt denies cough. No apparent lower extremity edema noted. Bilateral lower legs are warm, dry and intact. Bilateral feet warm, dry, and intact with toes slightly cool to touch. Foot pain is rated as a 5/10 on the right and a 4/10 on the left. BMI 24.3    Heart rhythm on monitor showed NS. Smoking history assessed and patient is a former smoker. Quit in 2000  Pt immunizations, medications, and allergies were reviewed and are up to date. Limitations: Patient has dizziness from side effects of medications and an orthopedic problem with right foot that causes her pain when walking distances. Patient completed the Modified [place]- Mac protocol. She reached stage 5 which is a speed of 2.0 MPH and elevation of 2.0. Her intermittent claudication time was 9:40; pain rating of 2 per protocol. No other s/s reported during test.    Pt reported goals are:  1. Walk on the TM 5 days per week for 30 min  2. Maintain HH diet and be consistent with intake of fruits and vegetables to 5 servings daily  3. Learn coping mechanisms for short temper and stress     PSYCHOSOCIAL:  Patient has a supportive  and five children that are also part of her life. She admits to having a short temper but states she does not have any substantial stress. She enjoys reading for relaxation and would like to work on coping mechanisms for anger.

## 2018-12-12 NOTE — CARDIO/PULMONARY
Koehler-Mac Protocol, 2 minute stages. Stage: Speed (MPH): Elevation (%): Pain:   1 2.0 0.0    2 2.0 2.0    3 2.0 3.0    4 2.0 6.0    5 2.0 8.0    6 2.0 10.0    7 2.0 12.0    8 2.0 14.0      Koehler-Mac modified Protocol, 2 minute stages.   Stage: Speed (MPH): Elevation (%): Pain:   1 0.5 0    2 1.0 0    3 1.5 0    4 2.0 0    5 2.0 2    6 2.0 4    7 2.0 6    8 2.0 8    9 2.0 10      Comments:      Initial IC time:             Peak IC           Total walk time      Lungs                  Edema                     Cough                    Ht                      Wt                         Waist                        Hips

## 2018-12-13 RX ORDER — ISOSORBIDE MONONITRATE 60 MG/1
TABLET, EXTENDED RELEASE ORAL
Qty: 60 TAB | Refills: 0 | Status: SHIPPED | OUTPATIENT
Start: 2018-12-13 | End: 2019-02-20 | Stop reason: SDUPTHER

## 2018-12-14 ENCOUNTER — HOSPITAL ENCOUNTER (OUTPATIENT)
Dept: CARDIAC REHAB | Age: 68
Discharge: HOME | End: 2018-12-14
Payer: MEDICARE

## 2018-12-14 VITALS — BODY MASS INDEX: 24.5 KG/M2 | WEIGHT: 156.4 LBS

## 2018-12-14 PROCEDURE — 93668 PERIPHERAL VASCULAR REHAB: CPT

## 2018-12-20 ENCOUNTER — HOSPITAL ENCOUNTER (OUTPATIENT)
Dept: CARDIAC REHAB | Age: 68
Discharge: HOME | End: 2018-12-20
Payer: MEDICARE

## 2018-12-20 VITALS — BODY MASS INDEX: 24.28 KG/M2 | WEIGHT: 155 LBS

## 2018-12-20 PROCEDURE — 93668 PERIPHERAL VASCULAR REHAB: CPT

## 2018-12-21 ENCOUNTER — APPOINTMENT (OUTPATIENT)
Dept: CARDIAC REHAB | Age: 68
End: 2018-12-21
Payer: MEDICARE

## 2018-12-27 ENCOUNTER — HOSPITAL ENCOUNTER (OUTPATIENT)
Dept: CARDIAC REHAB | Age: 68
Discharge: HOME | End: 2018-12-27
Payer: MEDICARE

## 2018-12-27 VITALS — BODY MASS INDEX: 23.93 KG/M2 | WEIGHT: 152.8 LBS

## 2018-12-27 PROCEDURE — 93668 PERIPHERAL VASCULAR REHAB: CPT

## 2018-12-31 ENCOUNTER — HOSPITAL ENCOUNTER (OUTPATIENT)
Dept: CARDIAC REHAB | Age: 68
Discharge: HOME | End: 2018-12-31
Payer: MEDICARE

## 2018-12-31 VITALS — BODY MASS INDEX: 24.03 KG/M2 | WEIGHT: 153.4 LBS

## 2018-12-31 PROCEDURE — 93668 PERIPHERAL VASCULAR REHAB: CPT

## 2019-01-02 ENCOUNTER — HOSPITAL ENCOUNTER (OUTPATIENT)
Dept: CARDIAC REHAB | Age: 69
Discharge: HOME | End: 2019-01-02
Payer: MEDICARE

## 2019-01-02 VITALS — BODY MASS INDEX: 24.06 KG/M2 | WEIGHT: 153.6 LBS

## 2019-01-02 PROCEDURE — 93668 PERIPHERAL VASCULAR REHAB: CPT

## 2019-01-07 ENCOUNTER — HOSPITAL ENCOUNTER (OUTPATIENT)
Dept: CARDIAC REHAB | Age: 69
Discharge: HOME | End: 2019-01-07
Payer: MEDICARE

## 2019-01-07 VITALS — WEIGHT: 153.6 LBS | BODY MASS INDEX: 24.06 KG/M2

## 2019-01-07 PROCEDURE — 93668 PERIPHERAL VASCULAR REHAB: CPT

## 2019-01-09 ENCOUNTER — HOSPITAL ENCOUNTER (OUTPATIENT)
Dept: CARDIAC REHAB | Age: 69
Discharge: HOME | End: 2019-01-09
Payer: MEDICARE

## 2019-01-09 VITALS — WEIGHT: 154.8 LBS | BODY MASS INDEX: 24.25 KG/M2

## 2019-01-09 PROCEDURE — 93668 PERIPHERAL VASCULAR REHAB: CPT

## 2019-01-11 ENCOUNTER — HOSPITAL ENCOUNTER (OUTPATIENT)
Dept: CARDIAC REHAB | Age: 69
Discharge: HOME | End: 2019-01-11
Payer: MEDICARE

## 2019-01-11 ENCOUNTER — HOSPITAL ENCOUNTER (OUTPATIENT)
Dept: GENERAL RADIOLOGY | Age: 69
Discharge: HOME | End: 2019-01-11
Payer: MEDICARE

## 2019-01-11 DIAGNOSIS — R10.9 ABDOMINAL PAIN: ICD-10-CM

## 2019-01-11 PROCEDURE — 93668 PERIPHERAL VASCULAR REHAB: CPT

## 2019-01-11 PROCEDURE — 74022 RADEX COMPL AQT ABD SERIES: CPT

## 2019-01-16 ENCOUNTER — HOSPITAL ENCOUNTER (OUTPATIENT)
Dept: CARDIAC REHAB | Age: 69
End: 2019-01-16
Payer: MEDICARE

## 2019-01-21 ENCOUNTER — HOSPITAL ENCOUNTER (OUTPATIENT)
Dept: CARDIAC REHAB | Age: 69
Discharge: HOME | End: 2019-01-21
Payer: MEDICARE

## 2019-01-21 VITALS — WEIGHT: 154 LBS | BODY MASS INDEX: 24.12 KG/M2

## 2019-01-21 PROCEDURE — 93668 PERIPHERAL VASCULAR REHAB: CPT

## 2019-01-28 ENCOUNTER — HOSPITAL ENCOUNTER (OUTPATIENT)
Dept: CARDIAC REHAB | Age: 69
Discharge: HOME | End: 2019-01-28
Payer: MEDICARE

## 2019-01-28 VITALS — WEIGHT: 154 LBS | BODY MASS INDEX: 24.12 KG/M2

## 2019-01-28 PROCEDURE — 93668 PERIPHERAL VASCULAR REHAB: CPT

## 2019-01-30 ENCOUNTER — HOSPITAL ENCOUNTER (OUTPATIENT)
Dept: CARDIAC REHAB | Age: 69
Discharge: HOME | End: 2019-01-30
Payer: MEDICARE

## 2019-01-30 PROCEDURE — 93668 PERIPHERAL VASCULAR REHAB: CPT

## 2019-02-06 ENCOUNTER — HOSPITAL ENCOUNTER (OUTPATIENT)
Dept: CARDIAC REHAB | Age: 69
Discharge: HOME | End: 2019-02-06
Payer: MEDICARE

## 2019-02-06 VITALS — WEIGHT: 155.4 LBS | BODY MASS INDEX: 24.34 KG/M2

## 2019-02-06 PROCEDURE — 93668 PERIPHERAL VASCULAR REHAB: CPT

## 2019-02-12 RX ORDER — CLOPIDOGREL BISULFATE 75 MG/1
TABLET ORAL
Qty: 90 TAB | Refills: 0 | Status: SHIPPED | OUTPATIENT
Start: 2019-02-12 | End: 2019-08-17 | Stop reason: SDUPTHER

## 2019-02-13 ENCOUNTER — DOCUMENTATION ONLY (OUTPATIENT)
Dept: CARDIOLOGY CLINIC | Age: 69
End: 2019-02-13

## 2019-02-20 RX ORDER — ISOSORBIDE MONONITRATE 60 MG/1
TABLET, EXTENDED RELEASE ORAL
Qty: 60 TAB | Refills: 0 | Status: SHIPPED | OUTPATIENT
Start: 2019-02-20 | End: 2019-04-08 | Stop reason: SDUPTHER

## 2019-02-26 ENCOUNTER — HOSPITAL ENCOUNTER (OUTPATIENT)
Dept: MAMMOGRAPHY | Age: 69
Discharge: HOME | End: 2019-02-26
Payer: MEDICARE

## 2019-02-26 ENCOUNTER — HOSPITAL ENCOUNTER (OUTPATIENT)
Dept: BONE DENSITY | Age: 69
Discharge: HOME | End: 2019-02-26
Payer: MEDICARE

## 2019-02-26 DIAGNOSIS — M81.0 AGE-RELATED OSTEOPOROSIS WITHOUT CURRENT PATHOLOGICAL FRACTURE: ICD-10-CM

## 2019-02-26 DIAGNOSIS — Z12.39 SCREENING BREAST EXAMINATION: ICD-10-CM

## 2019-02-26 PROCEDURE — 77067 SCR MAMMO BI INCL CAD: CPT

## 2019-02-26 PROCEDURE — 77080 DXA BONE DENSITY AXIAL: CPT

## 2019-04-08 ENCOUNTER — OFFICE VISIT (OUTPATIENT)
Dept: CARDIOLOGY CLINIC | Age: 69
End: 2019-04-08

## 2019-04-08 VITALS
HEART RATE: 78 BPM | DIASTOLIC BLOOD PRESSURE: 64 MMHG | OXYGEN SATURATION: 93 % | RESPIRATION RATE: 16 BRPM | BODY MASS INDEX: 23.2 KG/M2 | WEIGHT: 147.8 LBS | HEIGHT: 67 IN | SYSTOLIC BLOOD PRESSURE: 118 MMHG

## 2019-04-08 DIAGNOSIS — I25.10 ATHEROSCLEROSIS OF NATIVE CORONARY ARTERY OF NATIVE HEART WITHOUT ANGINA PECTORIS: Primary | ICD-10-CM

## 2019-04-08 DIAGNOSIS — Z95.5 S/P CORONARY ARTERY STENT PLACEMENT: ICD-10-CM

## 2019-04-08 DIAGNOSIS — I73.9 PAD (PERIPHERAL ARTERY DISEASE) (HCC): ICD-10-CM

## 2019-04-08 DIAGNOSIS — E78.5 HYPERLIPIDEMIA, UNSPECIFIED HYPERLIPIDEMIA TYPE: Chronic | ICD-10-CM

## 2019-04-08 DIAGNOSIS — I10 ESSENTIAL HYPERTENSION: Chronic | ICD-10-CM

## 2019-04-08 RX ORDER — SIMVASTATIN 40 MG/1
40 TABLET, FILM COATED ORAL DAILY
COMMUNITY
Start: 2019-01-29

## 2019-04-08 RX ORDER — ALENDRONATE SODIUM 10 MG/1
10 TABLET ORAL DAILY
Status: ON HOLD | COMMUNITY
Start: 2019-03-18 | End: 2021-07-09

## 2019-04-08 NOTE — PROGRESS NOTES
Yvon Pierce DNP, ANP-BC  Subjective/HPI:     Niall Dalton is a 80 y.o. female is here for routine f/u. The patient denies chest pain/ resting shortness of breath, orthopnea, PND, LE edema, palpitations, syncope, presyncope or fatigue. SInce last visit had collapse of right foot arch, current in brace, this limited her PAD exercise program at [address] She is able to walk w/o difficulty. Has intermittent SAHNI quickly resolves with rest; non limiting and not progressive since last visit. PCP Provider  Je Acuna MD  Past Medical History:   Diagnosis Date    Arthritis     BILAT HANDS    CAD (coronary artery disease)     high cholesterol; heart murmur    Cancer ([address]     uterine    Chronic kidney disease     Diabetes ([facility]     Endocrine disease     hypothyroidism    Hypertension     Neurological disorder     TIA    Other ill-defined conditions(799.89)     PVD; benign right breast tumor removed    Peripheral vascular angioplasty status 8/14/2013 8/14/13 s/p angioplasty L RFA    Stroke ([facility]     TIA  X3 NO RESIDUAL    Thyroid disease     HYPOTHYROID      Past Surgical History:   Procedure Laterality Date    BREAST SURGERY PROCEDURE UNLISTED      tumor removed    CARDIAC SURG PROCEDURE UNLIST      CARDIAC STENT  X2    HX ANKLE FRACTURE TX      pins and plates right ankle    HX BREAST BIOPSY Right     negative  1998    HX GYN      hysterectomy    HX OTHER SURGICAL      fem pop bypass on left     Allergies   Allergen Reactions    Pcn [Penicillins] Anaphylaxis    Codeine Palpitations    Ivp Dye [Fd And C Blue No.1] Nausea and Vomiting     \"I was told in [facility] that it would stop my kidney up if I got it again. \"      Family History   Problem Relation Age of Onset   Regine Cancer Mother     Hypertension Sister     Hypertension Sister     Heart Disease Brother 72    Stroke Sister         x2 sisters    Diabetes Father     Cancer Father       Current Outpatient Medications   Medication Sig    simvastatin (ZOCOR) 40 mg tablet Take 40 mg by mouth daily.  clopidogrel (PLAVIX) 75 mg tab TAKE 1 TABLET BY MOUTH ONCE DAILY    gabapentin (NEURONTIN) 300 mg capsule two (2) times a day.  insulin lispro (HUMALOG U-100 INSULIN) 100 unit/mL injection by SubCUTAneous route. INSULIN PUMP    trospium (SANCTURA XL) 60 mg capsule daily.  lisinopril (PRINIVIL, ZESTRIL) 20 mg tablet daily.  isosorbide mononitrate ER (IMDUR) 60 mg CR tablet TAKE ONE TABLET BY MOUTH ONCE DAILY    nitroglycerin (NITROSTAT) 0.4 mg SL tablet 1 Tab by SubLINGual route every five (5) minutes as needed for Chest Pain.  metoprolol tartrate (LOPRESSOR) 25 mg tablet TAKE ONE-HALF TABLET BY MOUTH TWICE DAILY    magnesium oxide (MAG-OX) 400 mg tablet Take 400 mg by mouth daily.  calcium 500 mg tab Take 1 Tab by mouth daily.  oxybutynin (DITROPAN) 5 mg tablet Take 5 mg by mouth three (3) times daily.  cholecalciferol, vitamin D3, (VITAMIN D3) 4,000 unit cap Take  by mouth daily.  levothyroxine (SYNTHROID) 50 mcg tablet Take 50 mcg by mouth daily (before breakfast).  CINNAMON BARK (CINNAMON PO) Take 1,000 mg by mouth daily.  alendronate (FOSAMAX) 10 mg tablet Take 10 mg by mouth daily.  NOVOLOG FLEXPEN U-100 INSULIN 100 unit/mL inpn 10 units once a day    lovastatin (MEVACOR) 20 mg tablet Take 20 mg by mouth nightly.  insulin NPH (HUMULIN N) 100 unit/mL (3 mL) pen by SubCUTAneous route as needed (as needed with dinner). Sliding scale     No current facility-administered medications for this visit.        Vitals:    04/08/19 1038 04/08/19 1050   BP: 120/62 118/64   Pulse: 78    Resp: 16    SpO2: 93%    Weight: 147 lb 12.8 oz (67 kg)    Height: 5' 7\" (1.702 m)      Social History     Socioeconomic History    Marital status:      Spouse name: Nancy Barnett    Number of children: 5    Years of education: Not on file    Highest education level: Not on file   Occupational History     Employer: NOT EMPLOYED   Social Needs    Financial resource strain: Somewhat hard    Food insecurity:     Worry: Never true     Inability: Never true    Transportation needs:     Medical: Not on file     Non-medical: Not on file   Tobacco Use    Smoking status: Former Smoker     Packs/day: 5.00     Years: 30.00     Pack years: 150.00     Last attempt to quit: 2000     Years since quittin.6    Smokeless tobacco: Never Used   Substance and Sexual Activity    Alcohol use: No     Alcohol/week: 0.0 oz    Drug use: No    Sexual activity: Not on file   Lifestyle    Physical activity:     Days per week: Not on file     Minutes per session: Not on file    Stress: Not on file   Relationships    Social connections:     Talks on phone: Not on file     Gets together: Not on file     Attends Scientologist service: Not on file     Active member of club or organization: Not on file     Attends meetings of clubs or organizations: Not on file     Relationship status: Not on file    Intimate partner violence:     Fear of current or ex partner: Not on file     Emotionally abused: Not on file     Physically abused: Not on file     Forced sexual activity: Not on file   Other Topics Concern     Service Not Asked    Blood Transfusions Not Asked    Caffeine Concern Not Asked    Occupational Exposure Not Asked   Skipper Sarna Hazards Not Asked    Sleep Concern Not Asked    Stress Concern Not Asked    Weight Concern Not Asked    Special Diet Not Asked    Back Care Not Asked    Exercise Not Asked    Bike Helmet Not Asked    Seat Belt Not Asked    Self-Exams Not Asked   Social History Narrative    Not on file       I have reviewed the nurses notes, vitals, problem list, allergy list, medical history, family, social history and medications. Review of Symptoms:    General: Pt denies excessive weight gain or loss.  Pt is able to conduct ADL's  HEENT: Denies blurred vision, headaches, epistaxis and difficulty swallowing. Respiratory: Denies shortness of breath, + mild SAHNI, no wheezing or stridor. Cardiovascular: Denies precordial pain, palpitations, edema or PND  Gastrointestinal: Denies poor appetite, indigestion, abdominal pain or blood in stool  Musculoskeletal: Denies pain or swelling from muscles or joints  Neurologic: Denies tremor, paresthesias, or sensory motor disturbance  Skin: Denies rash, itching or texture change. Physical Exam:      General: Well developed, in no acute distress, cooperative and alert  HEENT: No carotid bruits, no JVD, trach is midline. Neck Supple, PEERL, EOM intact. Heart:  Normal S1/S2 negative S3 or S4. Regular, no murmur, gallop or rub.   Respiratory: Clear bilaterally x 4, no wheezing or rales  Abdomen:   Soft, non-tender, no masses, bowel sounds are active.   Extremities:  No edema, normal cap refill, no cyanosis, atraumatic. Neuro: A&Ox3, speech clear, gait stable. Skin: Skin color is normal. No rashes or lesions.  Non diaphoretic  Vascular: 2+ pulses symmetric bilateral radial, +1 left PT,  +2 Rt popliteal (unable to remove brace off of ankle)     Cardiographics    ECG: NSR   Results for orders placed or performed during the hospital encounter of 09/19/16   EKG, 12 LEAD, INITIAL   Result Value Ref Range    Ventricular Rate 59 BPM    Atrial Rate 59 BPM    P-R Interval 186 ms    QRS Duration 98 ms    Q-T Interval 436 ms    QTC Calculation (Bezet) 431 ms    Calculated P Axis 72 degrees    Calculated R Axis 44 degrees    Calculated T Axis 70 degrees    Diagnosis       Sinus bradycardia  Minor nonspecific st&t changes , new  When compared with ECG of 19-SEP-2016 11:05,    Confirmed by Prudence Cortes MD, Landon Perez ([id number]) on 9/20/2016 4:48:21 PM           Cardiology Labs:  Lab Results   Component Value Date/Time    Cholesterol, total 172 02/29/2016 09:32 AM    HDL Cholesterol 74 02/29/2016 09:32 AM    LDL, calculated 83 02/29/2016 09:32 AM    Triglyceride 76 02/29/2016 09:32 AM CHOL/HDL Ratio 1.9 09/07/2010 10:21 AM       Lab Results   Component Value Date/Time    Sodium 144 10/24/2017 03:41 PM    Potassium 5.2 10/24/2017 03:41 PM    Chloride 101 10/24/2017 03:41 PM    CO2 28 10/24/2017 03:41 PM    Anion gap 8 09/20/2016 04:41 AM    Glucose 143 (H) 10/24/2017 03:41 PM    BUN 21 10/24/2017 03:41 PM    Creatinine 1.01 (H) 10/24/2017 03:41 PM    BUN/Creatinine ratio 21 10/24/2017 03:41 PM    GFR est AA 67 10/24/2017 03:41 PM    GFR est non-AA 58 (L) 10/24/2017 03:41 PM    Calcium 9.4 10/24/2017 03:41 PM    Bilirubin, total 0.2 10/24/2017 03:41 PM    AST (SGOT) 20 10/24/2017 03:41 PM    Alk. phosphatase 84 10/24/2017 03:41 PM    Protein, total 6.7 10/24/2017 03:41 PM    Albumin 4.4 10/24/2017 03:41 PM    Globulin 3.5 08/14/2013 08:44 AM    A-G Ratio 1.9 10/24/2017 03:41 PM    ALT (SGPT) 11 10/24/2017 03:41 PM           Assessment:     Assessment:     Diagnoses and all orders for this visit:    1. Atherosclerosis of native coronary artery of native heart without angina pectoris    2. Essential hypertension  -     AMB POC EKG ROUTINE W/ 12 LEADS, INTER & REP    3. PAD (peripheral artery disease) ([facility] [address]    4. S/P coronary artery stent placement    5. Hyperlipidemia, unspecified hyperlipidemia type        ICD-10-CM ICD-9-CM    1. Atherosclerosis of native coronary artery of native heart without angina pectoris I25.10 414.01    2. Essential hypertension I10 401.9 AMB POC EKG ROUTINE W/ 12 LEADS, INTER & REP   3. PAD (peripheral artery disease) (HCC) I73.9 443.9    4. S/P coronary artery stent placement Z95.5 V45.82    5. Hyperlipidemia, unspecified hyperlipidemia type E78.5 272.4      Orders Placed This Encounter    AMB POC EKG ROUTINE W/ 12 LEADS, INTER & REP     Order Specific Question:   Reason for Exam:     Answer:   ROUTINE    alendronate (FOSAMAX) 10 mg tablet     Sig: Take 10 mg by mouth daily.  simvastatin (ZOCOR) 40 mg tablet     Sig: Take 40 mg by mouth daily. Plan:     1. ATHEROSCLEROSIS OF NATIVE ARTERIES OF THE EXTREMITIES WITH INTERMITTENT CLAUDICATION: At present asymptomatic. Once cleared by Ortho, recommend getting back to walking at least 1 mile daily (plans to walk her dogs, will be getting custom made brace for right foot)    S/p right SFA atherectomy and DCB PTA 10/2017.    S/p b/l iliac stents 7/2014.    s/p left SFA laser athetectomy and stent 9/2011  s/p left SFA angioplasty for ISR 1/2012.   s/p left SFA redo laser atherectomy and PTA 10/2012  S/p left SFA angioplasty 8/2013      2. CAD s/p LCX ARCHANA 1/2012. S/p re intervention 10/2012. S/p RCA  PCI 09/2016. Stable. Continue current meds.       3. HYPERLIPIDEMIA, MIXED    On statin. 2/2019 LDL 65 Continue Zocor 40mg       4. BENIGN ESSENTIAL HYPERTENSION    Controlled. Continue current medications     5. DM: A1c 7.1%    Skyla Mir NP    This note was created using voice recognition software.  Despite editing, there may be syntax errors.        Patient seen and examined by me with nurse practitioner. Cheo Azar personally performed all components of the history, physical, and medical decision making and agree with the assessment and plan with minor modifications as noted.     Po Jeong MD

## 2019-04-08 NOTE — PROGRESS NOTES
1. Have you been to the ER, urgent care clinic since your last visit? Hospitalized since your last visit? NO    2. Have you seen or consulted any other health care providers outside of the [address] [place] [address] since your last visit? Include any pap smears or colon screening. NO    3 MONTH FOLLOW UP.  C/O SLIGHT SOB.

## 2019-05-08 RX ORDER — ISOSORBIDE MONONITRATE 60 MG/1
TABLET, EXTENDED RELEASE ORAL
Qty: 60 TAB | Refills: 0 | Status: SHIPPED | OUTPATIENT
Start: 2019-05-08 | End: 2019-07-17 | Stop reason: SDUPTHER

## 2019-07-17 RX ORDER — ISOSORBIDE MONONITRATE 60 MG/1
TABLET, EXTENDED RELEASE ORAL
Qty: 60 TAB | Refills: 0 | Status: SHIPPED | OUTPATIENT
Start: 2019-07-17 | End: 2019-09-17 | Stop reason: SDUPTHER

## 2019-07-30 ENCOUNTER — TELEPHONE (OUTPATIENT)
Dept: CARDIOLOGY CLINIC | Age: 69
End: 2019-07-30

## 2019-07-30 NOTE — TELEPHONE ENCOUNTER
Pts  called about paperwork he brought in last week to have Dr. Molina David sign off on, please give him a call regarding the paperwork    thanks

## 2019-07-30 NOTE — TELEPHONE ENCOUNTER
Pt is having tooth extraction. She is on Plavix, can she stop medication, if so how many days before? Does she need an antibiotic before? I have the paper on my desk for you signature, thanks. Message   Received: Yesterday   Message Contents   MD Cassidy Ace LPN   Caller: Unspecified (Yesterday,  1:34 PM)             Yes, 5 days. No.          Faxed dental clearance to ATTN: [facility] at [phone] stating that pt can stop her Plavix 5 days before her tooth extraction. Pt does not need an antibiotic before extraction per . Received fax confirmation.

## 2019-08-18 RX ORDER — CLOPIDOGREL BISULFATE 75 MG/1
TABLET ORAL
Qty: 90 TAB | Refills: 0 | Status: SHIPPED | OUTPATIENT
Start: 2019-08-18 | End: 2020-03-11 | Stop reason: SDUPTHER

## 2019-09-18 RX ORDER — ISOSORBIDE MONONITRATE 60 MG/1
TABLET, EXTENDED RELEASE ORAL
Qty: 60 TAB | Refills: 0 | Status: SHIPPED | OUTPATIENT
Start: 2019-09-18 | End: 2019-11-21 | Stop reason: SDUPTHER

## 2019-09-27 ENCOUNTER — TELEPHONE (OUTPATIENT)
Dept: CARDIOLOGY CLINIC | Age: 69
End: 2019-09-27

## 2019-09-27 NOTE — TELEPHONE ENCOUNTER
Noted, pt was last seen on 4/8/19 and to f/u in 6 months. Her next appt on 10/8/19 and will need to be addressed then. Called pt ,verified pt with two pt identifiers, advised pt I had received a call regarding her dizziness at her PCP office. Pt had an appt yesterday and they advised to let us know. Pt is having the same dizziness she was having at her last visit on 4/8/19 with . She notes 1 incident where she did have to stop walking and get on floor because she thought she was going to pass out. She has not passed out yet. She is having no other concerning symptoms. She does have an appt on 10/8/19 with  to address the dizziness then. Advised pt is she does pass out , or any other concerning symptoms start then she needs to go to the ER. Pt verbalized understanding.

## 2019-10-08 ENCOUNTER — OFFICE VISIT (OUTPATIENT)
Dept: CARDIOLOGY CLINIC | Age: 69
End: 2019-10-08

## 2019-10-08 VITALS
DIASTOLIC BLOOD PRESSURE: 60 MMHG | OXYGEN SATURATION: 95 % | WEIGHT: 152.8 LBS | SYSTOLIC BLOOD PRESSURE: 140 MMHG | HEIGHT: 67 IN | HEART RATE: 70 BPM | BODY MASS INDEX: 23.98 KG/M2 | RESPIRATION RATE: 16 BRPM

## 2019-10-08 DIAGNOSIS — I10 ESSENTIAL HYPERTENSION: Chronic | ICD-10-CM

## 2019-10-08 DIAGNOSIS — I73.9 PAD (PERIPHERAL ARTERY DISEASE) (HCC): ICD-10-CM

## 2019-10-08 DIAGNOSIS — I25.10 ATHEROSCLEROSIS OF NATIVE CORONARY ARTERY OF NATIVE HEART WITHOUT ANGINA PECTORIS: Primary | ICD-10-CM

## 2019-10-08 DIAGNOSIS — Z95.5 S/P CORONARY ARTERY STENT PLACEMENT: ICD-10-CM

## 2019-10-08 NOTE — PROGRESS NOTES
1. Have you been to the ER, urgent care clinic since your last visit? Hospitalized since your last visit? No    2. Have you seen or consulted any other health care providers outside of the [address] [place] [address] since your last visit? Include any pap smears or colon screening. PCP 9/2019    Chief Complaint   Patient presents with    Follow-up     Dizziness    She is taking another medication for bladder frequency not sure of name.

## 2019-10-08 NOTE — PROGRESS NOTES
Yoli Sarabia DNP, ANP-BC  Subjective/HPI:     Zoya Steven is a 77 y.o. female is here for routine f/u. Ms. Marium Hollingsworth reports she has been having increasing levels of fatigue generalized weakness, lack of energy and willingness to get up and do things. She has been seen by primary care without any significant findings according to patient, has hypothyroid labs regularly followed by endocrine. She denies limiting dyspnea on exertion, denies exertional chest pain. She has a history of PAD, denies ambulatory leg pain. History of  of the RCA opened in 2017, she reports the symptoms are atypical to her previous presentation 2 years ago. Currently on insulin pump, with waxing and waning glucose levels presently in office 259 with only having a handful of chips this morning along with a few sips of diet Pepsi.     PCP Provider  Alonzo Valencia MD  Past Medical History:   Diagnosis Date    Arthritis     BILAT HANDS    CAD (coronary artery disease)     high cholesterol; heart murmur    Cancer ([address]     uterine    Chronic kidney disease     Diabetes ([address]     Endocrine disease     hypothyroidism    Hypertension     Neurological disorder     TIA    Other ill-defined conditions(799.89)     PVD; benign right breast tumor removed    Peripheral vascular angioplasty status 8/14/2013 8/14/13 s/p angioplasty L RFA    Stroke ([address]     TIA  X3 NO RESIDUAL    Thyroid disease     HYPOTHYROID      Past Surgical History:   Procedure Laterality Date    BREAST SURGERY PROCEDURE UNLISTED      tumor removed    CARDIAC SURG PROCEDURE UNLIST      CARDIAC STENT  X2    HX ANKLE FRACTURE TX      pins and plates right ankle    HX BREAST BIOPSY Right     negative  1998    HX GYN      hysterectomy    HX OTHER SURGICAL      fem pop bypass on left     Allergies   Allergen Reactions    Pcn [Penicillins] Anaphylaxis    Codeine Palpitations    Ivp Dye [Fd And C Blue No.1] Nausea and Vomiting     \"I was told [facility] that it would stop my kidney up if I got it again. \"      Family History   Problem Relation Age of Onset    Cancer Mother     Hypertension Sister     Hypertension Sister     Heart Disease Brother 72    Stroke Sister         x2 sisters    Diabetes Father     Cancer Father       Current Outpatient Medications   Medication Sig    isosorbide mononitrate ER (IMDUR) 60 mg CR tablet TAKE 1 TABLET BY MOUTH ONCE DAILY    clopidogrel (PLAVIX) 75 mg tab TAKE 1 TABLET BY MOUTH ONCE DAILY    alendronate (FOSAMAX) 10 mg tablet Take 10 mg by mouth daily.  simvastatin (ZOCOR) 40 mg tablet Take 40 mg by mouth daily.  gabapentin (NEURONTIN) 300 mg capsule two (2) times a day.  insulin lispro (HUMALOG U-100 INSULIN) 100 unit/mL injection by SubCUTAneous route. INSULIN PUMP    lisinopril (PRINIVIL, ZESTRIL) 20 mg tablet daily.  nitroglycerin (NITROSTAT) 0.4 mg SL tablet 1 Tab by SubLINGual route every five (5) minutes as needed for Chest Pain.  calcium 500 mg tab Take 1 Tab by mouth daily.  cholecalciferol, vitamin D3, (VITAMIN D3) 4,000 unit cap Take  by mouth daily.  levothyroxine (SYNTHROID) 50 mcg tablet Take 50 mcg by mouth daily (before breakfast).  CINNAMON BARK (CINNAMON PO) Take 1,000 mg by mouth daily. No current facility-administered medications for this visit.        Vitals:    10/08/19 1021 10/08/19 1022 10/08/19 1023 10/08/19 1024   BP: 160/70 124/70 130/70 140/60   Pulse:  70     Resp:  16     SpO2:  95%     Weight:  152 lb 12.8 oz (69.3 kg)     Height:  5' 7\" (1.702 m)       Social History     Socioeconomic History    Marital status:      Spouse name: Ryan Lozano    Number of children: 5    Years of education: Not on file    Highest education level: Not on file   Occupational History     Employer: NOT EMPLOYED   Social Needs    Financial resource strain: Somewhat hard    Food insecurity:     Worry: Never true     Inability: Never true   [employer] needs: Medical: Not on file     Non-medical: Not on file   Tobacco Use    Smoking status: Former Smoker     Packs/day: 5.00     Years: 30.00     Pack years: 150.00     Last attempt to quit: 2000     Years since quittin.1    Smokeless tobacco: Never Used   Substance and Sexual Activity    Alcohol use: No     Alcohol/week: 0.0 standard drinks    Drug use: No    Sexual activity: Not on file   Lifestyle    Physical activity:     Days per week: Not on file     Minutes per session: Not on file    Stress: Not on file   Relationships    Social connections:     Talks on phone: Not on file     Gets together: Not on file     Attends Taoist service: Not on file     Active member of club or organization: Not on file     Attends meetings of clubs or organizations: Not on file     Relationship status: Not on file    Intimate partner violence:     Fear of current or ex partner: Not on file     Emotionally abused: Not on file     Physically abused: Not on file     Forced sexual activity: Not on file   Other Topics Concern     Service Not Asked    Blood Transfusions Not Asked    Caffeine Concern Not Asked    Occupational Exposure Not Asked   Rosalene Carter Hazards Not Asked    Sleep Concern Not Asked    Stress Concern Not Asked    Weight Concern Not Asked    Special Diet Not Asked    Back Care Not Asked    Exercise Not Asked    Bike Helmet Not Asked    [facility] Not Asked    Self-Exams Not Asked   Social History Narrative    Not on file       I have reviewed the nurses notes, vitals, problem list, allergy list, medical history, family, social history and medications. Review of Symptoms:    General: Pt denies excessive weight gain or loss. Pt is able to conduct ADL's sign fatigue generalized weakness   HEENT: Denies blurred vision, headaches, epistaxis and difficulty swallowing. Respiratory: Denies shortness of breath, SAHNI, wheezing or stridor.   Cardiovascular: Denies precordial pain, palpitations, edema or PND  Gastrointestinal: Denies poor appetite, indigestion, abdominal pain or blood in stool  Musculoskeletal: Denies pain or swelling from muscles or joints  Neurologic: Denies tremor, paresthesias, or sensory motor disturbance  Skin: Denies rash, itching or texture change. Physical Exam:      General: Well developed, in no acute distress, cooperative and alert  HEENT: No carotid bruits, no JVD, trach is midline. Neck Supple, PERRL, EOM intact. Heart:  Normal S1/S2 negative S3 or S4. Regular, no murmur, gallop or rub. Respiratory: Clear bilaterally x 4, no wheezing or rales  Abdomen:   Soft, non-tender, no masses, bowel sounds are active. Extremities:  No edema, normal cap refill, no cyanosis, atraumatic. Neuro: A&Ox3, speech clear, gait stable. Skin: Skin color is normal. No rashes or lesions.  Non diaphoretic  Vascular: +1 bilateral dorsalis pedis, +2 bilateral posterior tibialis +2 bilateral radial pulses symmetric    Cardiographics    ECG: Normal sinus rhythm  Results for orders placed or performed during the hospital encounter of 09/19/16   EKG, 12 LEAD, INITIAL   Result Value Ref Range    Ventricular Rate 59 BPM    Atrial Rate 59 BPM    P-R Interval 186 ms    QRS Duration 98 ms    Q-T Interval 436 ms    QTC Calculation (Bezet) 431 ms    Calculated P Axis 72 degrees    Calculated R Axis 44 degrees    Calculated T Axis 70 degrees    Diagnosis       Sinus bradycardia  Minor nonspecific st&t changes , new  When compared with ECG of 19-SEP-2016 11:05,    Confirmed by Ashlee Madrid MD, Justus Razo ([id number]) on 9/20/2016 4:48:21 PM           Cardiology Labs:  Lab Results   Component Value Date/Time    Cholesterol, total 172 02/29/2016 09:32 AM    HDL Cholesterol 74 02/29/2016 09:32 AM    LDL, calculated 83 02/29/2016 09:32 AM    Triglyceride 76 02/29/2016 09:32 AM    CHOL/HDL Ratio 1.9 09/07/2010 10:21 AM       Lab Results   Component Value Date/Time    Sodium 144 10/24/2017 03:41 PM    Potassium 5.2 10/24/2017 03:41 PM    Chloride 101 10/24/2017 03:41 PM    CO2 28 10/24/2017 03:41 PM    Anion gap 8 09/20/2016 04:41 AM    Glucose 143 (H) 10/24/2017 03:41 PM    BUN 21 10/24/2017 03:41 PM    Creatinine 1.01 (H) 10/24/2017 03:41 PM    BUN/Creatinine ratio 21 10/24/2017 03:41 PM    GFR est AA 67 10/24/2017 03:41 PM    GFR est non-AA 58 (L) 10/24/2017 03:41 PM    Calcium 9.4 10/24/2017 03:41 PM    Bilirubin, total 0.2 10/24/2017 03:41 PM    AST (SGOT) 20 10/24/2017 03:41 PM    Alk. phosphatase 84 10/24/2017 03:41 PM    Protein, total 6.7 10/24/2017 03:41 PM    Albumin 4.4 10/24/2017 03:41 PM    Globulin 3.5 08/14/2013 08:44 AM    A-G Ratio 1.9 10/24/2017 03:41 PM    ALT (SGPT) 11 10/24/2017 03:41 PM           Assessment:     Assessment:     Diagnoses and all orders for this visit:    1. Atherosclerosis of native coronary artery of native heart without angina pectoris  -     AMB POC EKG ROUTINE W/ 12 LEADS, INTER & REP    2. Essential hypertension    3. PAD (peripheral artery disease) [address] [address] [address]    4. S/P coronary artery stent placement        ICD-10-CM ICD-9-CM    1. Atherosclerosis of native coronary artery of native heart without angina pectoris I25.10 414.01 AMB POC EKG ROUTINE W/ 12 LEADS, INTER & REP   2. Essential hypertension I10 401.9    3. PAD (peripheral artery disease) ([facility]) I73.9 443.9    4. S/P coronary artery stent placement Z95.5 V45.82      Orders Placed This Encounter    AMB POC EKG ROUTINE W/ 12 LEADS, INTER & REP     Order Specific Question:   Reason for Exam:     Answer:   routine        Plan:     1. Atherosclerotic heart disease: History of CT of the RCA 2017, reporting intermittent dyspnea and increasing generalized fatigue and weakness without any other sources to trigger her symptoms. Will evaluate for ischemia and heart failure with exercise Myoview and echocardiogram  2. Hypertension: Controlled, minimally orthostatic today, continue current medications  3.   Peripheral arterial disease: Asymptomatic. 4.  Diabetes: Followed by endocrinology on insulin pump  5. Hyperlipidemia: On high intensity statin, LDL 65 February 2019. Follow-up in 6 months, sooner if abnormal testing. Hilario Smith NP    Please note that this dictation was completed with [employer], the computer voice recognition software. Quite often unanticipated grammatical, syntax, homophones, and other interpretive errors are inadvertently transcribed by the computer software. Please disregard these errors. Please excuse any errors that have escaped final proofreading. Thank you. Patient seen and examined by me with nurse practitioner. I personally performed all components of the history, physical, and medical decision making and agree with the assessment and plan as noted. 1. ATHEROSCLEROSIS OF NATIVE ARTERIES OF THE EXTREMITIES WITH INTERMITTENT CLAUDICATION:     S/p right SFA atherectomy and DCB PTA 10/2017.    S/p b/l iliac stents 7/2014.    s/p left SFA laser athetectomy and stent 9/2011  s/p left SFA angioplasty for ISR 1/2012.   s/p left SFA redo laser atherectomy and PTA 10/2012  S/p left SFA angioplasty 8/2013      2. CAD s/p LCX ARCHANA 1/2012. S/p re intervention 10/2012. S/p RCA  PCI 09/2016. As above.       Geovany Victor MD

## 2019-10-28 ENCOUNTER — TELEPHONE (OUTPATIENT)
Dept: CARDIOLOGY CLINIC | Age: 69
End: 2019-10-28

## 2019-10-28 NOTE — TELEPHONE ENCOUNTER
Called patient to confirm Nuclear stress test for 10/30. Message left confirming appointment time and reminder to hold all caffeine containing food, beverages and medicines for 24 hours.

## 2019-11-04 ENCOUNTER — TELEPHONE (OUTPATIENT)
Dept: CARDIOLOGY CLINIC | Age: 69
End: 2019-11-04

## 2019-11-04 NOTE — TELEPHONE ENCOUNTER
----- Message from Allyson Jackson MD sent at 10/31/2019 12:25 PM EDT -----  Inform her echo is k        Called pt and left message to call me back. Called pt,verified pt with two pt identifiers, advised pt her echo is okay, normal. Pt verbalized understanding.

## 2019-11-12 ENCOUNTER — OFFICE VISIT (OUTPATIENT)
Dept: CARDIOLOGY CLINIC | Age: 69
End: 2019-11-12

## 2019-11-12 VITALS
HEART RATE: 83 BPM | RESPIRATION RATE: 16 BRPM | HEIGHT: 67 IN | OXYGEN SATURATION: 100 % | WEIGHT: 152.3 LBS | SYSTOLIC BLOOD PRESSURE: 144 MMHG | BODY MASS INDEX: 23.9 KG/M2 | DIASTOLIC BLOOD PRESSURE: 78 MMHG

## 2019-11-12 DIAGNOSIS — Z95.5 S/P CORONARY ARTERY STENT PLACEMENT: ICD-10-CM

## 2019-11-12 DIAGNOSIS — Z98.61 S/P PTCA (PERCUTANEOUS TRANSLUMINAL CORONARY ANGIOPLASTY): Chronic | ICD-10-CM

## 2019-11-12 DIAGNOSIS — E78.5 HYPERLIPIDEMIA, UNSPECIFIED HYPERLIPIDEMIA TYPE: Chronic | ICD-10-CM

## 2019-11-12 DIAGNOSIS — I25.10 ATHEROSCLEROSIS OF NATIVE CORONARY ARTERY OF NATIVE HEART WITHOUT ANGINA PECTORIS: ICD-10-CM

## 2019-11-12 DIAGNOSIS — R06.09 DOE (DYSPNEA ON EXERTION): Primary | ICD-10-CM

## 2019-11-12 DIAGNOSIS — I10 ESSENTIAL HYPERTENSION: Chronic | ICD-10-CM

## 2019-11-12 RX ORDER — METOPROLOL SUCCINATE 25 MG/1
12.5 TABLET, EXTENDED RELEASE ORAL
Qty: 45 TAB | Refills: 0 | Status: SHIPPED | OUTPATIENT
Start: 2019-11-12 | End: 2020-02-03

## 2019-11-12 NOTE — PROGRESS NOTES
[facility], ANP-BC  Subjective/HPI:     Kwaku Course is a 77 y.o. female is here for test result follow-up. Ms. Maritza Ramires remains with episodes of limiting dyspnea on exertion and overwhelming fatigue. Patient has not been physically active, most recent activity that triggered symptoms is breaking up a dog fight among her dogs with a broom for a few seconds which triggered marked dyspnea and intermittent tightness of her chest.    Procedure Conclusion     Nuclear Stress Test     Abnormal myocardial perfusion imaging. There is a prior study available for comparison. As compared to the previous study, there are significant changes. There is a moderate sized, moderate grade, predominantly fixed mid to distal lateral wall defect with normal wall motion and thickening, most consistent with nontransmural infarction. Considering normal wall motion, one cannot exclude high-grade ischemia in the form of hibernating myocardium. Compared to the previous study from March 24, 2016, on the prior study there was a defect in this distribution that was predominantly reversible. Now the defect is similar, but is fixed. Clinical correlation needed. Interpretation Summary     · Baseline ECG: Sinus rhythm. · Gated SPECT: Left ventricular function post-stress was normal. Calculated ejection fraction is 59%. There is no evidence of transient ischemic dilation (TID). · Left ventricular perfusion is abnormal.  · Myocardial perfusion imaging defect 1: There is a defect that is moderate in size with a moderate reduction in uptake present in the mid-apical lateral location(s) that is non-reversible. There is normal wall motion in the defect area. The defect appears to probably be infarction. Perfusion defect was visually and quantitatively present. · Abnormal myocardial perfusion imaging.            PCP Provider  Frederick Doll MD  Past Medical History:   Diagnosis Date    Arthritis     BILAT HANDS    CAD (coronary artery disease)     high cholesterol; heart murmur    Cancer (HCC)     uterine    Chronic kidney disease     Diabetes ([facility] [address]     Endocrine disease     hypothyroidism    Hypertension     Neurological disorder     TIA    Other ill-defined conditions(799.89)     PVD; benign right breast tumor removed    Peripheral vascular angioplasty status 8/14/2013 8/14/13 s/p angioplasty L RFA    Stroke ([facility] [address]     TIA  X3 NO RESIDUAL    Thyroid disease     HYPOTHYROID      Past Surgical History:   Procedure Laterality Date    BREAST SURGERY PROCEDURE UNLISTED      tumor removed    CARDIAC SURG PROCEDURE UNLIST      CARDIAC STENT  X2    HX ANKLE FRACTURE TX      pins and plates right ankle    HX BREAST BIOPSY Right     negative  1998    HX GYN      hysterectomy    HX OTHER SURGICAL      fem pop bypass on left     Allergies   Allergen Reactions    Pcn [Penicillins] Anaphylaxis    Codeine Palpitations    Ivp Dye [Fd And C Blue No.1] Nausea and Vomiting     \"I was told in [facility] that it would stop my kidney up if I got it again. \"      Family History   Problem Relation Age of Onset    Cancer Mother     Hypertension Sister     Hypertension Sister     Heart Disease Brother 72    Stroke Sister         x2 sisters    Diabetes Father     Cancer Father       Current Outpatient Medications   Medication Sig    isosorbide mononitrate ER (IMDUR) 60 mg CR tablet TAKE 1 TABLET BY MOUTH ONCE DAILY    clopidogrel (PLAVIX) 75 mg tab TAKE 1 TABLET BY MOUTH ONCE DAILY    alendronate (FOSAMAX) 10 mg tablet Take 10 mg by mouth daily.  simvastatin (ZOCOR) 40 mg tablet Take 40 mg by mouth daily.  gabapentin (NEURONTIN) 300 mg capsule two (2) times a day.  insulin lispro (HUMALOG U-100 INSULIN) 100 unit/mL injection by SubCUTAneous route. INSULIN PUMP    lisinopril (PRINIVIL, ZESTRIL) 20 mg tablet daily.     nitroglycerin (NITROSTAT) 0.4 mg SL tablet 1 Tab by SubLINGual route every five (5) minutes as needed for Chest Pain.    calcium 500 mg tab Take 1 Tab by mouth daily.  cholecalciferol, vitamin D3, (VITAMIN D3) 4,000 unit cap Take  by mouth daily.  levothyroxine (SYNTHROID) 50 mcg tablet Take 50 mcg by mouth daily (before breakfast).  CINNAMON BARK (CINNAMON PO) Take 1,000 mg by mouth daily. No current facility-administered medications for this visit.        Vitals:    19 0929   BP: 144/78   Pulse: 83   Resp: 16   SpO2: 100%   Weight: 152 lb 4.8 oz (69.1 kg)   Height: 5' 7\" (1.702 m)     Social History     Socioeconomic History    Marital status:      Spouse name: Deisi Carranza    Number of children: 5    Years of education: Not on file    Highest education level: Not on file   Occupational History     Employer: NOT EMPLOYED   Social Needs    Financial resource strain: Somewhat hard    Food insecurity:     Worry: Never true     Inability: Never true    Transportation needs:     Medical: Not on file     Non-medical: Not on file   Tobacco Use    Smoking status: Former Smoker     Packs/day: 5.00     Years: 30.00     Pack years: 150.00     Last attempt to quit: 2000     Years since quittin.2    Smokeless tobacco: Never Used   Substance and Sexual Activity    Alcohol use: No     Alcohol/week: 0.0 standard drinks    Drug use: No    Sexual activity: Not on file   Lifestyle    Physical activity:     Days per week: Not on file     Minutes per session: Not on file    Stress: Not on file   Relationships    Social connections:     Talks on phone: Not on file     Gets together: Not on file     Attends Tenriism service: Not on file     Active member of club or organization: Not on file     Attends meetings of clubs or organizations: Not on file     Relationship status: Not on file    Intimate partner violence:     Fear of current or ex partner: Not on file     Emotionally abused: Not on file     Physically abused: Not on file     Forced sexual activity: Not on file   Other Topics Concern [address] Not Asked    Blood Transfusions Not Asked    Caffeine Concern Not Asked    Occupational Exposure Not Asked    Hobby Hazards Not Asked    Sleep Concern Not Asked    Stress Concern Not Asked    Weight Concern Not Asked    Special Diet Not Asked    Back Care Not Asked    Exercise Not Asked    Bike Helmet Not Asked   [address] Not Asked    Self-Exams Not Asked   Social History Narrative    Not on file       I have reviewed the nurses notes, vitals, problem list, allergy list, medical history, family, social history and medications. Review of Symptoms:    General: Pt denies excessive weight gain or loss. Patient has limitations of activities of daily life secondary to dyspnea and fatigue  HEENT: Denies blurred vision, headaches, epistaxis and difficulty swallowing. Respiratory: + Shortness of breath, +SAHNI, denies wheezing or stridor. Cardiovascular: Denies precordial pain, palpitations, edema or PND  Gastrointestinal: Denies poor appetite, indigestion, abdominal pain or blood in stool  Musculoskeletal: Denies pain or swelling from muscles or joints  Neurologic: Denies tremor, paresthesias, or sensory motor disturbance  Skin: Denies rash, itching or texture change. Physical Exam:      General: Well developed, in no acute distress, cooperative and alert  HEENT: No carotid bruits, no JVD, trach is midline. Neck Supple, PERRL, EOM intact. Heart:  Normal S1/S2 negative S3 or S4. Regular, no murmur, gallop or rub. Respiratory: Clear bilaterally x 4, no wheezing or rales  Abdomen:   Soft, non-tender, no masses, bowel sounds are active. Extremities:  No edema, normal cap refill, no cyanosis, atraumatic. Neuro: A&Ox3, speech clear, gait stable. Skin: Skin color is normal. No rashes or lesions.  Non diaphoretic  Vascular: 2+ pulses symmetric in all extremities    Cardiographics    ECG: Sinus rhythm  Results for orders placed or performed during the hospital encounter of 09/19/16   EKG, 12 LEAD, INITIAL   Result Value Ref Range    Ventricular Rate 59 BPM    Atrial Rate 59 BPM    P-R Interval 186 ms    QRS Duration 98 ms    Q-T Interval 436 ms    QTC Calculation (Bezet) 431 ms    Calculated P Axis 72 degrees    Calculated R Axis 44 degrees    Calculated T Axis 70 degrees    Diagnosis       Sinus bradycardia  Minor nonspecific st&t changes , new  When compared with ECG of 19-SEP-2016 11:05,    Confirmed by Edna Gatica MD, Diane Angel ([id number]) on 9/20/2016 4:48:21 PM           Cardiology Labs:  Lab Results   Component Value Date/Time    Cholesterol, total 172 02/29/2016 09:32 AM    HDL Cholesterol 74 02/29/2016 09:32 AM    LDL, calculated 83 02/29/2016 09:32 AM    Triglyceride 76 02/29/2016 09:32 AM    CHOL/HDL Ratio 1.9 09/07/2010 10:21 AM       Lab Results   Component Value Date/Time    Sodium 144 10/24/2017 03:41 PM    Potassium 5.2 10/24/2017 03:41 PM    Chloride 101 10/24/2017 03:41 PM    CO2 28 10/24/2017 03:41 PM    Anion gap 8 09/20/2016 04:41 AM    Glucose 143 (H) 10/24/2017 03:41 PM    BUN 21 10/24/2017 03:41 PM    Creatinine 1.01 (H) 10/24/2017 03:41 PM    BUN/Creatinine ratio 21 10/24/2017 03:41 PM    GFR est AA 67 10/24/2017 03:41 PM    GFR est non-AA 58 (L) 10/24/2017 03:41 PM    Calcium 9.4 10/24/2017 03:41 PM    Bilirubin, total 0.2 10/24/2017 03:41 PM    AST (SGOT) 20 10/24/2017 03:41 PM    Alk. phosphatase 84 10/24/2017 03:41 PM    Protein, total 6.7 10/24/2017 03:41 PM    Albumin 4.4 10/24/2017 03:41 PM    Globulin 3.5 08/14/2013 08:44 AM    A-G Ratio 1.9 10/24/2017 03:41 PM    ALT (SGPT) 11 10/24/2017 03:41 PM           Assessment:     Assessment:     Diagnoses and all orders for this visit:    1. SAHNI (dyspnea on exertion)    2. Atherosclerosis of native coronary artery of native heart without angina pectoris    3. Essential hypertension    4. Hyperlipidemia, unspecified hyperlipidemia type    5. S/P PTCA (percutaneous transluminal coronary angioplasty)    6.  S/P coronary artery stent placement        ICD-10-CM ICD-9-CM    1. SAHNI (dyspnea on exertion) R06.09 786.09    2. Atherosclerosis of native coronary artery of native heart without angina pectoris I25.10 414.01    3. Essential hypertension I10 401.9    4. Hyperlipidemia, unspecified hyperlipidemia type E78.5 272.4    5. S/P PTCA (percutaneous transluminal coronary angioplasty) Z98.61 V45.82    6. S/P coronary artery stent placement Z95.5 V45.82      No orders of the defined types were placed in this encounter. Plan:     1. Atherosclerotic heart disease: History of PTCA stenting,  2017 presenting with abnormal nuclear stress test with symptomatic dyspnea on exertion fatigue consistent with angina functional class III. Discussed risk and benefits of coronary angiography PTCA stenting she is willing to proceed  2. Hypertension: Mildly elevated 144/78 will restart low-dose beta-blocker nightly. It was previously held secondary to fatigue, her fatigue has not improved off of beta-blocker. 3.  Hyperlipidemia: LDL 65 continue statin therapy    Procedure code 03669,, possible [id number], possible [id number], possible [id number] and Joelsandra 51, NP      Please note that this dictation was completed with [employer], the [employer] voice recognition software. Quite often unanticipated grammatical, syntax, homophones, and other interpretive errors are inadvertently transcribed by the computer software. Please disregard these errors. Please excuse any errors that have escaped final proofreading. Thank you. Patient seen and examined by me with nurse practitioner. I personally performed all components of the history, physical, and medical decision making and agree with the assessment and plan as noted. I discussed the risks/benefits/alternatives of the procedure with the patient. Risks include (but are not limited to) bleeding, infection, cva/mi/tamponade/death. The patient understands and agrees to proceed.     1. ATHEROSCLEROSIS OF NATIVE ARTERIES OF THE EXTREMITIES WITH INTERMITTENT CLAUDICATION:     S/p right SFA atherectomy and DCB PTA 10/2017.    S/p b/l iliac stents 7/2014.    s/p left SFA laser athetectomy and stent 9/2011  s/p left SFA angioplasty for ISR 1/2012.   s/p left SFA redo laser atherectomy and PTA 10/2012  S/p left SFA angioplasty 8/2013      2.  CAD s/p LCX ARCHANA 1/2012. S/p re intervention 10/2012. S/p RCA  PCI 09/2016. As above.      Geovany Victor MD

## 2019-11-12 NOTE — PROGRESS NOTES
Identified pt with two pt identifiers(name and ). Reviewed record in preparation for visit and have obtained necessary documentation. Chief Complaint   Patient presents with    Results     stress test results        Visit Vitals  /78 (BP 1 Location: Left arm, BP Patient Position: Sitting)   Pulse 83   Resp 16   Ht 5' 7\" (1.702 m)   Wt 152 lb 4.8 oz (69.1 kg)   SpO2 100%   BMI 23.85 kg/m²       Pain Scale: 0 - No pain/10  Pain Location:     1) Have you been to an emergency room, urgent care, or hospitalized since your last visit? If yes, where when, and reason for visit? no       2. Have seen or consulted any other health care provider since your last visit? If yes, where when, and reason for visit?   NO

## 2019-11-18 PROBLEM — R06.09 DOE (DYSPNEA ON EXERTION): Status: ACTIVE | Noted: 2019-11-18

## 2019-11-21 RX ORDER — ISOSORBIDE MONONITRATE 60 MG/1
TABLET, EXTENDED RELEASE ORAL
Qty: 60 TAB | Refills: 0 | Status: SHIPPED | OUTPATIENT
Start: 2019-11-21 | End: 2019-12-23

## 2019-12-04 ENCOUNTER — HOSPITAL ENCOUNTER (OUTPATIENT)
Age: 69
Discharge: HOME | End: 2019-12-04
Attending: INTERNAL MEDICINE | Admitting: INTERNAL MEDICINE
Payer: MEDICARE

## 2019-12-04 VITALS
SYSTOLIC BLOOD PRESSURE: 159 MMHG | RESPIRATION RATE: 16 BRPM | OXYGEN SATURATION: 96 % | BODY MASS INDEX: 23.88 KG/M2 | HEIGHT: 67 IN | HEART RATE: 86 BPM | DIASTOLIC BLOOD PRESSURE: 62 MMHG | TEMPERATURE: 97.5 F | WEIGHT: 152.12 LBS

## 2019-12-04 DIAGNOSIS — I10 ESSENTIAL HYPERTENSION: Chronic | ICD-10-CM

## 2019-12-04 DIAGNOSIS — I25.10 ATHEROSCLEROSIS OF NATIVE CORONARY ARTERY OF NATIVE HEART WITHOUT ANGINA PECTORIS: ICD-10-CM

## 2019-12-04 DIAGNOSIS — R06.09 DOE (DYSPNEA ON EXERTION): ICD-10-CM

## 2019-12-04 LAB
ALBUMIN SERPL-MCNC: 4.1 G/DL (ref 3.6–4.8)
ALBUMIN/GLOB SERPL: 1.7 {RATIO} (ref 1.2–2.2)
ALP SERPL-CCNC: 90 IU/L (ref 39–117)
ALT SERPL-CCNC: 11 IU/L (ref 0–32)
AST SERPL-CCNC: 23 IU/L (ref 0–40)
BASOPHILS # BLD AUTO: 0 X10E3/UL (ref 0–0.2)
BASOPHILS NFR BLD AUTO: 1 %
BILIRUB SERPL-MCNC: 0.4 MG/DL (ref 0–1.2)
BUN SERPL-MCNC: 13 MG/DL (ref 8–27)
BUN/CREAT SERPL: 12 (ref 12–28)
CALCIUM SERPL-MCNC: 9.4 MG/DL (ref 8.7–10.3)
CHLORIDE SERPL-SCNC: 102 MMOL/L (ref 96–106)
CHOLEST SERPL-MCNC: 174 MG/DL (ref 100–199)
CO2 SERPL-SCNC: 25 MMOL/L (ref 20–29)
CREAT SERPL-MCNC: 1.09 MG/DL (ref 0.57–1)
EOSINOPHIL # BLD AUTO: 0.4 X10E3/UL (ref 0–0.4)
EOSINOPHIL NFR BLD AUTO: 9 %
ERYTHROCYTE [DISTWIDTH] IN BLOOD BY AUTOMATED COUNT: 12.8 % (ref 12.3–15.4)
GLOBULIN SER CALC-MCNC: 2.4 G/DL (ref 1.5–4.5)
GLUCOSE BLD STRIP.AUTO-MCNC: 113 MG/DL (ref 65–100)
GLUCOSE BLD STRIP.AUTO-MCNC: 114 MG/DL (ref 65–100)
GLUCOSE SERPL-MCNC: 107 MG/DL (ref 65–99)
HCT VFR BLD AUTO: 38.6 % (ref 34–46.6)
HDLC SERPL-MCNC: 68 MG/DL
HGB BLD-MCNC: 12.9 G/DL (ref 11.1–15.9)
IMM GRANULOCYTES # BLD AUTO: 0 X10E3/UL (ref 0–0.1)
IMM GRANULOCYTES NFR BLD AUTO: 0 %
INR PPP: 1.1 (ref 0.8–1.2)
INTERPRETATION, 910389: NORMAL
INTERPRETATION: NORMAL
LDLC SERPL CALC-MCNC: 96 MG/DL (ref 0–99)
LYMPHOCYTES # BLD AUTO: 1.2 X10E3/UL (ref 0.7–3.1)
LYMPHOCYTES NFR BLD AUTO: 27 %
MCH RBC QN AUTO: 29.7 PG (ref 26.6–33)
MCHC RBC AUTO-ENTMCNC: 33.4 G/DL (ref 31.5–35.7)
MCV RBC AUTO: 89 FL (ref 79–97)
MONOCYTES # BLD AUTO: 0.4 X10E3/UL (ref 0.1–0.9)
MONOCYTES NFR BLD AUTO: 8 %
NEUTROPHILS # BLD AUTO: 2.5 X10E3/UL (ref 1.4–7)
NEUTROPHILS NFR BLD AUTO: 55 %
PDF IMAGE, 910387: NORMAL
PLATELET # BLD AUTO: 187 X10E3/UL (ref 150–450)
POTASSIUM SERPL-SCNC: 4.6 MMOL/L (ref 3.5–5.2)
PROT SERPL-MCNC: 6.5 G/DL (ref 6–8.5)
PROTHROMBIN TIME: 11.1 SEC (ref 9.1–12)
RBC # BLD AUTO: 4.34 X10E6/UL (ref 3.77–5.28)
SERVICE CMNT-IMP: ABNORMAL
SERVICE CMNT-IMP: ABNORMAL
SODIUM SERPL-SCNC: 142 MMOL/L (ref 134–144)
TRIGL SERPL-MCNC: 51 MG/DL (ref 0–149)
VLDLC SERPL CALC-MCNC: 10 MG/DL (ref 5–40)
WBC # BLD AUTO: 4.6 X10E3/UL (ref 3.4–10.8)

## 2019-12-04 PROCEDURE — 77030019697 HC SYR ANGI INFL MRTM -B: Performed by: INTERNAL MEDICINE

## 2019-12-04 PROCEDURE — 99152 MOD SED SAME PHYS/QHP 5/>YRS: CPT | Performed by: INTERNAL MEDICINE

## 2019-12-04 PROCEDURE — C1894 INTRO/SHEATH, NON-LASER: HCPCS | Performed by: INTERNAL MEDICINE

## 2019-12-04 PROCEDURE — 74011000258 HC RX REV CODE- 258: Performed by: INTERNAL MEDICINE

## 2019-12-04 PROCEDURE — 93571 IV DOP VEL&/PRESS C FLO 1ST: CPT | Performed by: INTERNAL MEDICINE

## 2019-12-04 PROCEDURE — 82962 GLUCOSE BLOOD TEST: CPT

## 2019-12-04 PROCEDURE — 93458 L HRT ARTERY/VENTRICLE ANGIO: CPT | Performed by: INTERNAL MEDICINE

## 2019-12-04 PROCEDURE — 74011636320 HC RX REV CODE- 636/320: Performed by: INTERNAL MEDICINE

## 2019-12-04 PROCEDURE — 77030038269 HC DRN EXT URIN PURWCK BARD -A

## 2019-12-04 PROCEDURE — 77030002996 HC SUT SLK J&J -A: Performed by: INTERNAL MEDICINE

## 2019-12-04 PROCEDURE — C1887 CATHETER, GUIDING: HCPCS | Performed by: INTERNAL MEDICINE

## 2019-12-04 PROCEDURE — 74011250636 HC RX REV CODE- 250/636: Performed by: INTERNAL MEDICINE

## 2019-12-04 PROCEDURE — C1769 GUIDE WIRE: HCPCS | Performed by: INTERNAL MEDICINE

## 2019-12-04 PROCEDURE — 74011000250 HC RX REV CODE- 250: Performed by: INTERNAL MEDICINE

## 2019-12-04 PROCEDURE — 77030013797 HC KT TRNSDUC PRSSR EDWD -A: Performed by: INTERNAL MEDICINE

## 2019-12-04 RX ORDER — FENTANYL CITRATE 50 UG/ML
INJECTION, SOLUTION INTRAMUSCULAR; INTRAVENOUS AS NEEDED
Status: DISCONTINUED | OUTPATIENT
Start: 2019-12-04 | End: 2019-12-04 | Stop reason: HOSPADM

## 2019-12-04 RX ORDER — TROSPIUM CHLORIDE 20 MG/1
60 TABLET, FILM COATED ORAL 2 TIMES DAILY
COMMUNITY
End: 2021-06-22

## 2019-12-04 RX ORDER — MIDAZOLAM HYDROCHLORIDE 1 MG/ML
INJECTION, SOLUTION INTRAMUSCULAR; INTRAVENOUS AS NEEDED
Status: DISCONTINUED | OUTPATIENT
Start: 2019-12-04 | End: 2019-12-04 | Stop reason: HOSPADM

## 2019-12-04 RX ORDER — SODIUM CHLORIDE 9 MG/ML
100 INJECTION, SOLUTION INTRAVENOUS CONTINUOUS
Status: DISCONTINUED | OUTPATIENT
Start: 2019-12-04 | End: 2019-12-04 | Stop reason: HOSPADM

## 2019-12-04 RX ORDER — DEXTROSE 50 % IN WATER (D50W) INTRAVENOUS SYRINGE
12.5-25 AS NEEDED
Status: DISCONTINUED | OUTPATIENT
Start: 2019-12-04 | End: 2019-12-04 | Stop reason: HOSPADM

## 2019-12-04 RX ORDER — MAGNESIUM SULFATE 100 %
4 CRYSTALS MISCELLANEOUS AS NEEDED
Status: DISCONTINUED | OUTPATIENT
Start: 2019-12-04 | End: 2019-12-04 | Stop reason: HOSPADM

## 2019-12-04 RX ORDER — LIDOCAINE HYDROCHLORIDE 10 MG/ML
INJECTION, SOLUTION EPIDURAL; INFILTRATION; INTRACAUDAL; PERINEURAL AS NEEDED
Status: DISCONTINUED | OUTPATIENT
Start: 2019-12-04 | End: 2019-12-04 | Stop reason: HOSPADM

## 2019-12-04 RX ORDER — HEPARIN SODIUM 200 [USP'U]/100ML
INJECTION, SOLUTION INTRAVENOUS
Status: COMPLETED | OUTPATIENT
Start: 2019-12-04 | End: 2019-12-04

## 2019-12-04 RX ADMIN — SODIUM CHLORIDE 100 ML/HR: 900 INJECTION, SOLUTION INTRAVENOUS at 10:31

## 2019-12-04 NOTE — Clinical Note
TRANSFER - OUT REPORT:     Verbal report given to: SREEKANTH Skelton. Report consisted of patient's Situation, Background, Assessment and   Recommendations(SBAR). Opportunity for questions and clarification was provided. Patient transported to: [facility].

## 2019-12-04 NOTE — DIABETES MGMT
[facility]      DTC Pump and Consult Note    Recommendations/ Comments: Pt should give all insulin via her pump. Noted plan to discharge pt today. Current hospital DM medication: insulin pump    If patient needs to be removed from pump for greater than 90 minutes then insulin must be replaced with basal Lantus and Humalog correction scale plus meal bolus or insulin gtt    If patient needs an MRI, CT scan or Xray:   1. Insulin pump must be disconnected at the infusion site and left outside of the room  2. If using a Sure T or TruSteel infusion set it also must be removed completely. 3. If patient is wearing a continuous glucose monitor (sensor) then it must be completely removed. Met with pt for consult.  at bedside. Pt's  helps pt manage her pump secondary to her poor vision. Pt changed her site yesterday. Pt also has a dexcom G6 CGM which was changed several days ago. Pt is followed by Dr. Sophie León and reports her last a1c was 6.2%. Consult received for: []          Assessment of home management     []          Outpatient education     [x]          Insulin pump patient     []          Insulin infusion     []          DKA/HHS    Chart reviewed and initial evaluation complete on Sita Lorenzana. Patient is a 77 y.o. female with known DM on Tandem insulin pump at home. Insulin Pump Settings    Date of last site change per patient 12/3/19 .  Total 24 hour basal dose = 16.4 units    If patient needs to come off of insulin pump therapy Total 24 hour basal dose above should be increased by 20% for multiple daily injections      Basal Rates       0000  = 0.35 units/hr  0500  = 0.5 units/hr  0800  = 0.8 units/hr  1700  = 0.85 units/hr    Insulin Carb Ratios  0000  = 1 unit per 14 gram  0500  = 1 unit per 15 gram   0800  = 1 unit per 13 gram   1700  = 1 unit per 14 gram            Insulin Sensitivity Factors  1 unit per 60 mg/dl       Target BG  130 mg/dl    Active Insulin   4 hours    If no to any of the following the pump should be d/c per hospital policy:  · Patient able to program doctors ordered settings,   yes [x]            No   []                · Patient able to provide pump materials  yes [x]              No   []            · Able to change infusion set and fill a new syringe at least for 3 days ( 2 days if pregnant. yes [x]              No   []            · Patient will change setting if redness, swelling, blood backing up, pump alerts, \"No Delivery\" alarm, or two or more glucose reading         in a row greater than 250mg/dl    yes [x]              No   []           · Patient able to repeat basal rates [x]              No   []            · Patient able to bolus corrections and meals based on physician orders. yes [x]         No   []                A1c:   Lab Results   Component Value Date/Time    Hemoglobin A1c 6.4 (H) 02/29/2016 09:32 AM       Recent Glucose Results:   Lab Results   Component Value Date/Time    GLUCPOC 114 (H) 12/04/2019 10:38 AM    GLUCPOC 113 (H) 12/04/2019 09:29 AM        Lab Results   Component Value Date/Time    Creatinine 1.09 (H) 12/03/2019 11:16 AM     Estimated Creatinine Clearance: 48 mL/min (A) (based on SCr of 1.09 mg/dL (H)). Active Orders   Diet    DIET CARDIAC Regular        PO intake: No data found. Will continue to follow as needed. Thank you.   MIAH DrakeN, RN, [address]

## 2019-12-04 NOTE — PROGRESS NOTES
10:31 - Patient returned to unit with 6Fr sheath to R groin to pressure bag. NS infusing at 100mL/hr. Drowsy but easily arousable.  at bedside asking to resume insulin pump. DTC has been consulted. 12:22 - Alerted CL of sheath pull needed. 13:14 - CL to pull sheath now. 14:26 - HOB 30  R groin site CDI.    17:23 - Ambulated in hallway with mild dizziness. Up to chair. R groin CDI.    17:48 - Dizziness resolved. Discharge instructions given to patient and . Verbalizes understanding. PIV and tele removed.

## 2019-12-04 NOTE — Clinical Note
Aspirin Registry Question:   Aspirin was discussed with physician prior to the procedure. Aspirin was not given prior to the procedure.

## 2019-12-04 NOTE — DISCHARGE INSTRUCTIONS
[address] [id number]      [address] [place] [address] INSTRUCTIONS      Patient ID:  Bishop Ramires  [id number]  35 y.o.  1950    Admit Date: 12/4/2019    Discharge Date: 12/4/2019     Admitting Physician: Geovany Victor MD     Discharge Physician: Geovany Victor MD    Admission Diagnoses:   SAHNI (dyspnea on exertion) [R06.09]    Discharge Diagnoses:   Principal Problem:    SAHNI (dyspnea on exertion) (11/18/2019)      Overview: Added automatically from request for surgery [id number]        Discharge Condition: Good    Cardiology Procedures this Admission:  Diagnostic left heart catheterization    Disposition: home      Reference discharge instructions provided by nursing for diet and activity. Signed: Geovany Victor MD  12/4/2019  10:23 AM    CARDIAC CATHETERIZATION/PCI DISCHARGE INSTRUCTIONS    It is normal to feel tired the first couple days. Take it easy and follow the physicians instructions. CHECK THE CATHETER INSERTION SITE DAILY:    You may shower 24 hours after the procedure, remove the bandage during showering. Wash with soap and water and pat dry. Gentle cleaning of the site with soap and water is sufficient, cover with a dry clean dressing or bandage. Do not apply creams or powders to the area. Do not sit in a bathtub or pool of water for 7 days or until wound has completely healed. Temporary bruising and discomfort is normal and may last a few weeks. You may have a  formation of a small lump at the site which may last up to 6 weeks. CALL THE PHYSICIANS:    If the site becomes red, swollen or feels warm to the touch  If there is bleeding or drainage or if there is unusual pain at the groin or down the leg. If there is any bleeding, lie down, apply pressure or have someone apply pressure with a clean cloth until the bleeding stops.    If the bleeding continues, call 911 to be transported to the hospital.  DO NOT DRIVE YOURSELF, OR HAVE ANYONE ELSE DRIVE YOU - CALL [phone]. ACTIVITY:    For the first 24-48 hours or as instructed by the physician:  No lifting, pushing or pulling over 10 pounds and no straining the insertion site. Do not life grocery bags or the garbage can, do not run the vacuum  or  for 7 days. Start with short walks as in the hospital and gradually increase as tolerated each day. It is recommended to walk 30 minutes 5-7 days per week. Follow your physicians instructions on activity. Avoid walking outside in extremes of heat or cold. Walk inside when it is cold and windy or hot and humid. Things to keep in mind:  No driving for at least 24 hours, or as designated by your physician. Limit the number of times you go up and down the stairs  Take rests and pace yourself with activity. Be careful and do not strain with bowel movements. MEDICATIONS:    Take all medications as prescribed  Call your physician if you have any questions  Keep an updated list of your medications with you at all times and give a list to your physician and pharmacist        SIGNS AND SYMPTOMS:    Be cautious of symptoms of angina or recurrent symptoms such as chest discomfort, unusual shortness of breath or fatigue. These could be symptoms of restenosis, a new blockage or a heart attack. If your symptoms are relieved with rest it is still recommended that you notify your physician of recurrent chest pain or discomfort. FOR CHEST PAIN or symptoms of angina not relieved with rest:  If the discomfort is not relieved with rest, and you have been prescribed Nitroglycerin, take as directed (taken under the tongue, one at a time 5 minutes apart for a total of 3 doses). If the discomfort is not relieved after the 3rd nitroglycerin, call 911. If you have not been prescribed Nitroglycerin  and your chest discomfort is not relieved with rest, call 911.      AFTER CARE:    Follow up with your physician as instructed. Follow a heart healthy diet with proper portion control, daily stress management, daily exercise, blood pressure and cholesterol control , and smoking cessation.

## 2019-12-04 NOTE — PROGRESS NOTES
6fr sheath removed from the right femoral artery, manual pressure and quickclot held for 10 min. Upon release no oozing or hematoma noted. Groin inspected and care taken over by CITLALI Amador R.N.

## 2019-12-23 ENCOUNTER — OFFICE VISIT (OUTPATIENT)
Dept: CARDIOLOGY CLINIC | Age: 69
End: 2019-12-23

## 2019-12-23 VITALS
RESPIRATION RATE: 16 BRPM | BODY MASS INDEX: 24.01 KG/M2 | HEART RATE: 104 BPM | HEIGHT: 67 IN | DIASTOLIC BLOOD PRESSURE: 50 MMHG | SYSTOLIC BLOOD PRESSURE: 100 MMHG | WEIGHT: 153 LBS | OXYGEN SATURATION: 98 %

## 2019-12-23 DIAGNOSIS — I95.9 HYPOTENSION, UNSPECIFIED HYPOTENSION TYPE: ICD-10-CM

## 2019-12-23 DIAGNOSIS — E78.2 MIXED HYPERLIPIDEMIA: Chronic | ICD-10-CM

## 2019-12-23 DIAGNOSIS — I10 ESSENTIAL HYPERTENSION: Chronic | ICD-10-CM

## 2019-12-23 DIAGNOSIS — R42 DIZZINESS: ICD-10-CM

## 2019-12-23 DIAGNOSIS — Z95.5 S/P CORONARY ARTERY STENT PLACEMENT: ICD-10-CM

## 2019-12-23 DIAGNOSIS — I73.9 PAD (PERIPHERAL ARTERY DISEASE) (HCC): ICD-10-CM

## 2019-12-23 DIAGNOSIS — I25.10 ATHEROSCLEROSIS OF NATIVE CORONARY ARTERY OF NATIVE HEART WITHOUT ANGINA PECTORIS: Primary | ICD-10-CM

## 2019-12-23 DIAGNOSIS — Z98.61 S/P PTCA (PERCUTANEOUS TRANSLUMINAL CORONARY ANGIOPLASTY): Chronic | ICD-10-CM

## 2019-12-23 NOTE — PROGRESS NOTES
Verified patient with 2 identifiers   Reviewed record in preparation for visit and obtained necessary documentation. Chief Complaint   Patient presents with   [facility] Follow Up     Left heart cath done on 12/4/19 - here for follow up     Shortness of Breath     with and without exertion     Dizziness     very dizzy      1. Have you been to the ER, urgent care clinic since your last visit? Hospitalized since your last visit? No     2. Have you seen or consulted any other health care providers outside of the [address] [place] [address] since your last visit? Include any pap smears or colon screening.   No

## 2019-12-23 NOTE — PROGRESS NOTES
12/23/2019 9:57 AM      Subjective:     Wale Butler is seen in office today after recent cath. Per pt for last few days she is feeling dizzy roxann on standing. Orthostatic in office. She denies chest pain, chest pressure/discomfort, dyspnea, palpitations, irregular heart beats, syncope, orthopnea, paroxysmal nocturnal dyspnea, exertional chest pressure/discomfort, claudication, lower extremity edema. Visit Vitals  /50   Pulse (!) 104   Resp 16   Ht 5' 7\" (1.702 m)   Wt 153 lb (69.4 kg)   SpO2 98%   BMI 23.96 kg/m²     Current Outpatient Medications   Medication Sig    trospium (SANCTURA) 20 mg tablet Take 60 mg by mouth two (2) times a day.  clopidogrel (PLAVIX) 75 mg tab TAKE 1 TABLET BY MOUTH ONCE DAILY    alendronate (FOSAMAX) 10 mg tablet Take 10 mg by mouth daily.  simvastatin (ZOCOR) 40 mg tablet Take 40 mg by mouth daily.  gabapentin (NEURONTIN) 300 mg capsule two (2) times a day.  insulin lispro (HUMALOG U-100 INSULIN) 100 unit/mL injection by SubCUTAneous route. INSULIN PUMP    nitroglycerin (NITROSTAT) 0.4 mg SL tablet 1 Tab by SubLINGual route every five (5) minutes as needed for Chest Pain.  calcium 500 mg tab Take 1 Tab by mouth daily.  cholecalciferol, vitamin D3, (VITAMIN D3) 4,000 unit cap Take  by mouth daily.  levothyroxine (SYNTHROID) 50 mcg tablet Take 50 mcg by mouth daily (before breakfast).  CINNAMON BARK (CINNAMON PO) Take 1,000 mg by mouth daily.  metoprolol succinate (TOPROL-XL) 25 mg XL tablet Take 0.5 Tabs by mouth nightly. No current facility-administered medications for this visit.           Objective:      Visit Vitals  /50   Pulse (!) 104   Resp 16   Ht 5' 7\" (1.702 m)   Wt 153 lb (69.4 kg)   SpO2 98%   BMI 23.96 kg/m²       Data Review:    EKG: Normal sinus rhythm, no acute st/t changes    Reviewed and/or ordered active problem list, medication list tests    Past Medical History:   Diagnosis Date    Arthritis BILAT HANDS    CAD (coronary artery disease)     high cholesterol; heart murmur    Cancer (HCC)     uterine    Chronic kidney disease     [facility]     Endocrine disease     hypothyroidism    Hypertension     Neurological disorder     TIA    Other ill-defined conditions(799.89)     PVD; benign right breast tumor removed    Peripheral vascular angioplasty status 2013 s/p angioplasty L RFA    Stroke ([facility]     TIA  X3 NO RESIDUAL    Thyroid disease     HYPOTHYROID      Past Surgical History:   Procedure Laterality Date    BREAST SURGERY PROCEDURE UNLISTED      tumor removed    CARDIAC SURG PROCEDURE UNLIST      CARDIAC STENT  X2    HX ANKLE FRACTURE TX      pins and plates right ankle    HX BREAST BIOPSY Right     negative      HX GYN      hysterectomy    HX OTHER SURGICAL      fem pop bypass on left     Allergies   Allergen Reactions    Pcn [Penicillins] Anaphylaxis    Codeine Palpitations    Ivp Dye [Fd And C Blue No.1] Nausea and Vomiting     \"I was told in [facility] that it would stop my kidney up if I got it again. \"      Family History   Problem Relation Age of Onset   Radhika Nino Cancer Mother     Hypertension Sister     Hypertension Sister     Heart Disease Brother 72    Stroke Sister         x2 sisters    Diabetes Father     Cancer Father       Social History     Socioeconomic History    Marital status:      Spouse name: Wendie Vazquez    Number of children: 11    Years of education: Not on file    Highest education level: Not on file   Occupational History     Employer: NOT EMPLOYED   Social Needs    Financial resource strain: Somewhat hard    Food insecurity:     Worry: Never true     Inability: Never true    Transportation needs:     Medical: Not on file     Non-medical: Not on file   Tobacco Use    Smoking status: Former Smoker     Packs/day: 5.00     Years: 30.00     Pack years: 150.00     Last attempt to quit: 2000     Years since quittin.3    Smokeless tobacco: Never Used   Substance and Sexual Activity    Alcohol use: No     Alcohol/week: 0.0 standard drinks    Drug use: No    Sexual activity: Not on file   Lifestyle    Physical activity:     Days per week: Not on file     Minutes per session: Not on file    Stress: Not on file   Relationships    Social connections:     Talks on phone: Not on file     Gets together: Not on file     Attends Jain service: Not on file     Active member of club or organization: Not on file     Attends meetings of clubs or organizations: Not on file     Relationship status: Not on file    Intimate partner violence:     Fear of current or ex partner: Not on file     Emotionally abused: Not on file     Physically abused: Not on file     Forced sexual activity: Not on file   Other Topics Concern     Service Not Asked    Blood Transfusions Not Asked    Caffeine Concern Not Asked    Occupational Exposure Not Asked   Carmie Bame Hazards Not Asked    Sleep Concern Not Asked    Stress Concern Not Asked    Weight Concern Not Asked    Special Diet Not Asked    Back Care Not Asked    Exercise Not Asked    Bike Helmet Not Asked    Seat Belt Not Asked    Self-Exams Not Asked   Social History Narrative    Not on file         Review of Systems     General: Not Present- Anorexia, Chills, Dietary Changes, Fatigue, Fever, Medication Changes, Night Sweats, Weight Gain > 10lbs. and Weight Loss > 10lbs. .  Skin: Not Present- Bruising and Excessive Sweating. HEENT: Not Present- Headache, Visual Loss and Vertigo. Respiratory: Not Present- Cough, Decreased Exercise Tolerance, Difficulty Breathing, Snoring and Wheezing.   Cardiovascular: Not Present- Abnormal Blood Pressure, Chest Pain, Claudications, Difficulty Breathing On Exertion, Edema, Fainting / Blacking Out, Irregular Heart Beat, Night Cramps, Orthopnea, Palpitations, Paroxysmal Nocturnal Dyspnea, Rapid Heart Rate, Shortness of Breath and Swelling of Extremities. Gastrointestinal: Not Present- Black, Tarry Stool, Bloody Stool, Diarrhea, Hematemesis, Rectal Bleeding and Vomiting. Musculoskeletal: Not Present- Muscle Pain and Muscle Weakness. Neurological: Present- Dizziness. Psychiatric: Not Present- Depression. Endocrine: Not Present- Cold Intolerance, Heat Intolerance and Thyroid Problems. Hematology: Not Present- Abnormal Bleeding, Anemia, Blood Clots and Easy Bruising.       Physical Exam   The physical exam findings are as follows:       General   Mental Status - Alert. General Appearance - Not in acute distress. Chest and Lung Exam   Inspection: Accessory muscles - No use of accessory muscles in breathing. Auscultation:   Breath sounds: - Normal.      Cardiovascular   Inspection: Jugular vein - Bilateral - Inspection Normal.  Palpation/Percussion:   Apical Impulse: - Normal.  Auscultation: Rhythm - Regular. Heart Sounds - S1 WNL and S2 WNL. No S3 or S4. Murmurs & Other Heart Sounds: Auscultation of the heart reveals - No Murmurs. Carotid arteries - No Carotid bruit. Peripheral Vascular   Upper Extremity: Inspection - Bilateral - No Cyanotic nailbeds or Digital clubbing. Lower Extremity:   Palpation: Edema - Bilateral - No edema. Assessment:       ICD-10-CM ICD-9-CM    1. Atherosclerosis of native coronary artery of native heart without angina pectoris I25.10 414.01 AMB POC EKG ROUTINE W/ 12 LEADS, INTER & REP   2. Mixed hyperlipidemia E78.2 272.2    3. PAD (peripheral artery disease) ([facility]) I73.9 443.9    4. S/P PTCA (percutaneous transluminal coronary angioplasty) Z98.61 V45.82    5. S/P coronary artery stent placement Z95.5 V45.82    6. Essential hypertension I10 401.9    7. Hypotension, unspecified hypotension type I95.9 458.9    8. Dizziness R42 780.4        Plan:     1. Orthostatic hypotension and dizziness: DC imdur and lisinopril. Monitor BP at home. BP check in office on Friday. F/u with me in 6 wks.  Advised to go to ER if no improvement or any worsening over next 1-2 days. 2. CAD s/p LCX ARCHANA 1/2012. S/p re intervention 10/2012. S/p RCA  PCI 09/2016. No significant CAD on recent cath. Normal LVEF. 3. HTN: as above. 4. Hyperlipidemia: on statin.    5. ATHEROSCLEROSIS OF NATIVE ARTERIES OF THE EXTREMITIES WITH INTERMITTENT CLAUDICATION:     S/p right SFA atherectomy and DCB PTA 10/2017.    S/p b/l iliac stents 7/2014.    s/p left SFA laser athetectomy and stent 9/2011  s/p left SFA angioplasty for ISR 1/2012.   s/p left SFA redo laser atherectomy and PTA 10/2012  S/p left SFA angioplasty 8/2013

## 2019-12-27 ENCOUNTER — OFFICE VISIT (OUTPATIENT)
Dept: CARDIOLOGY CLINIC | Age: 69
End: 2019-12-27

## 2019-12-27 VITALS
SYSTOLIC BLOOD PRESSURE: 136 MMHG | DIASTOLIC BLOOD PRESSURE: 66 MMHG | BODY MASS INDEX: 23.18 KG/M2 | OXYGEN SATURATION: 95 % | WEIGHT: 147.7 LBS | RESPIRATION RATE: 18 BRPM | HEIGHT: 67 IN | HEART RATE: 76 BPM

## 2019-12-27 DIAGNOSIS — I10 ESSENTIAL HYPERTENSION: ICD-10-CM

## 2019-12-27 DIAGNOSIS — I25.10 ATHEROSCLEROSIS OF NATIVE CORONARY ARTERY OF NATIVE HEART WITHOUT ANGINA PECTORIS: Primary | ICD-10-CM

## 2019-12-27 NOTE — PROGRESS NOTES
1. Have you been to the ER, urgent care clinic since your last visit? Hospitalized since your last visit? No    2. Have you seen or consulted any other health care providers outside of the [address] [place] [address] since your last visit? Include any pap smears or colon screening. No    Chief Complaint   Patient presents with    Blood Pressure Check     For BP check. Denied cardiac symptoms.

## 2019-12-27 NOTE — PROGRESS NOTES
[address], [address] [facility]  [phone]     Subjective:      Harlan Madera is a 77 y.o. female presents for f/u---seen last Monday 12/23/19 for post cardiac cath f/u---she was orthostatic in clinic and c/o lightheadedness. Dr Christie Perales stopped her Imdur and lisinopril. Today she states feeling better, home BP 130s/70s, reports resolution of lightheadedness. The patient denies chest pain/ shortness of breath, orthopnea, PND, LE edema, palpitations, syncope, or presyncope.        Patient Active Problem List    Diagnosis Date Noted    Hypotension 12/23/2019    SAHNI (dyspnea on exertion) 11/18/2019    Dizziness 08/13/2015    Peripheral vascular angioplasty status 08/14/2013    Angina, class III ([address] 06/18/2013    Angina pectoris ([address] 06/07/2013    S/P coronary artery stent placement 10/05/2012    SOB (shortness of breath) 10/02/2012    Atherosclerosis of native artery of extremity with intermittent claudication ([address] 10/02/2012    TIA (transient ischemic attack) 05/22/2012    Other and unspecified hyperlipidemia 05/22/2012    DM type 1 (diabetes mellitus, type 1) ([address] 05/22/2012    S/P PTCA (percutaneous transluminal coronary angioplasty) 01/14/2012    HTN (hypertension) 01/13/2012    Hypothyroidism 01/13/2012    Hyperlipidemia 01/13/2012    Coronary atherosclerosis of native coronary artery 01/05/2012    Abnormal cardiovascular stress test 01/05/2012    PAD (peripheral artery disease) ([address] 01/05/2012      Annabel Dancer, MD  Past Medical History:   Diagnosis Date    Arthritis     BILAT HANDS    CAD (coronary artery disease)     high cholesterol; heart murmur    Cancer ([address]     uterine    Chronic kidney disease     Diabetes ([address]     Endocrine disease     hypothyroidism    Hypertension     Neurological disorder     TIA    Other ill-defined conditions(799.89)     PVD; benign right breast tumor removed    Peripheral vascular angioplasty status 2013 s/p angioplasty L RFA    Stroke ([address]     TIA  X3 NO RESIDUAL    Thyroid disease     HYPOTHYROID      Past Surgical History:   Procedure Laterality Date    BREAST SURGERY PROCEDURE UNLISTED      tumor removed    CARDIAC SURG PROCEDURE UNLIST      CARDIAC STENT  X2    HX ANKLE FRACTURE TX      pins and plates right ankle    HX BREAST BIOPSY Right     negative      HX GYN      hysterectomy    HX OTHER SURGICAL      fem pop bypass on left     Allergies   Allergen Reactions    Pcn [Penicillins] Anaphylaxis    Codeine Palpitations    Ivp Dye [Fd And C Blue No.1] Nausea and Vomiting     \"I was told [facility] that it would stop my kidney up if I got it again. \"      Family History   Problem Relation Age of Onset   [facility] Cancer Mother     Hypertension Sister     Hypertension Sister     Heart Disease Brother 72    Stroke Sister         x2 sisters    Diabetes Father     Cancer Father       Social History     Socioeconomic History    Marital status:      Spouse name: Rogelio Hameed    Number of children: 11    Years of education: Not on file    Highest education level: Not on file   Occupational History     Employer: NOT EMPLOYED   Social Needs    Financial resource strain: Somewhat hard    Food insecurity:     Worry: Never true     Inability: Never true    Transportation needs:     Medical: Not on file     Non-medical: Not on file   Tobacco Use    Smoking status: Former Smoker     Packs/day: 5.00     Years: 30.00     Pack years: 150.00     Last attempt to quit: 2000     Years since quittin.3    Smokeless tobacco: Never Used   Substance and Sexual Activity    Alcohol use: No     Alcohol/week: 0.0 standard drinks    Drug use: No    Sexual activity: Not on file   Lifestyle    Physical activity:     Days per week: Not on file     Minutes per session: Not on file    Stress: Not on file   Relationships    Social connections:     Talks on phone: Not on file     Gets together: Not on file     Attends Holiness service: Not on file     Active member of club or organization: Not on file     Attends meetings of clubs or organizations: Not on file     Relationship status: Not on file    Intimate partner violence:     Fear of current or ex partner: Not on file     Emotionally abused: Not on file     Physically abused: Not on file     Forced sexual activity: Not on file   Other Topics Concern     Service Not Asked    Blood Transfusions Not Asked    Caffeine Concern Not Asked    Occupational Exposure Not Asked   [place] Hazards Not Asked    Sleep Concern Not Asked    Stress Concern Not Asked    Weight Concern Not Asked    Special Diet Not Asked    Back Care Not Asked    Exercise Not Asked    Bike Helmet Not Asked   [facility] [facility] Not Asked    Self-Exams Not Asked   Social History Narrative    Not on file      Current Outpatient Medications   Medication Sig    trospium (SANCTURA) 20 mg tablet Take 60 mg by mouth two (2) times a day.  metoprolol succinate (TOPROL-XL) 25 mg XL tablet Take 0.5 Tabs by mouth nightly.  clopidogrel (PLAVIX) 75 mg tab TAKE 1 TABLET BY MOUTH ONCE DAILY    alendronate (FOSAMAX) 10 mg tablet Take 10 mg by mouth daily.  simvastatin (ZOCOR) 40 mg tablet Take 40 mg by mouth daily.  gabapentin (NEURONTIN) 300 mg capsule two (2) times a day.  insulin lispro (HUMALOG U-100 INSULIN) 100 unit/mL injection by SubCUTAneous route. INSULIN PUMP    nitroglycerin (NITROSTAT) 0.4 mg SL tablet 1 Tab by SubLINGual route every five (5) minutes as needed for Chest Pain.  calcium 500 mg tab Take 1 Tab by mouth daily.  cholecalciferol, vitamin D3, (VITAMIN D3) 4,000 unit cap Take  by mouth daily.  levothyroxine (SYNTHROID) 50 mcg tablet Take 50 mcg by mouth daily (before breakfast).  CINNAMON BARK (CINNAMON PO) Take 1,000 mg by mouth daily. No current facility-administered medications for this visit.           Review of Symptoms:  11 systems reviewed, negative other than as stated in the HPI    Physical ExamPhysical Exam:    Vitals:    12/27/19 0831 12/27/19 0838   BP: 140/60 136/66   Pulse: 76    Resp: 18    SpO2: 95%    Weight: 147 lb 11.2 oz (67 kg)    Height: 5' 7\" (1.702 m)      Body mass index is 23.13 kg/m². General PE  Gen:  NAD  Mental Status - Alert. General Appearance - Not in acute distress. HEENT:  PERRL, no carotid bruits or JVD  Chest and Lung Exam   Inspection: Accessory muscles - No use of accessory muscles in breathing. Auscultation:   Breath sounds: - Normal.   Cardiovascular   Inspection: Jugular vein - Bilateral - Inspection Normal.   Palpation/Percussion:   Apical Impulse: - Normal.   Auscultation: Rhythm - Regular. Heart Sounds - S1 WNL and S2 WNL. No S3 or S4. Murmurs & Other Heart Sounds: Auscultation of the heart reveals - No Murmurs. Peripheral Vascular   Upper Extremity: Inspection - Bilateral - No Cyanotic nailbeds or Digital clubbing. Lower Extremity:   Palpation: Edema - Bilateral - No edema. Abdomen:   Soft, non-tender, bowel sounds are active.   Neuro: A&O times 3, CN and motor grossly WNL    Labs:   Lab Results   Component Value Date/Time    Cholesterol, total 174 12/03/2019 11:16 AM    Cholesterol, total 172 02/29/2016 09:32 AM    Cholesterol, total 161 04/11/2014 09:53 AM    Cholesterol, total 160 11/07/2012 08:42 AM    Cholesterol, total 161 09/07/2010 10:21 AM    HDL Cholesterol 68 12/03/2019 11:16 AM    HDL Cholesterol 74 02/29/2016 09:32 AM    HDL Cholesterol 76 04/11/2014 09:53 AM    HDL Cholesterol 71 11/07/2012 08:42 AM    HDL Cholesterol 86 09/07/2010 10:21 AM    LDL, calculated 96 12/03/2019 11:16 AM    LDL, calculated 83 02/29/2016 09:32 AM    LDL, calculated 73 04/11/2014 09:53 AM    LDL, calculated 77 11/07/2012 08:42 AM    LDL, calculated 68 09/07/2010 10:21 AM    Triglyceride 51 12/03/2019 11:16 AM    Triglyceride 76 02/29/2016 09:32 AM    Triglyceride 58 04/11/2014 09:53 AM    Triglyceride 62 11/07/2012 08:42 AM    Triglyceride 35 09/07/2010 10:21 AM    CHOL/HDL Ratio 1.9 09/07/2010 10:21 AM    CHOL/HDL Ratio 2.5 04/27/2010 09:11 AM    CHOL/HDL Ratio 2.5 03/15/2010 02:15 AM    CHOL/HDL Ratio 3.6 01/04/2010 09:00 AM    CHOL/HDL Ratio 2.6 09/16/2009 09:05 AM     Lab Results   Component Value Date/Time    CK 69 05/06/2010 06:09 PM     Lab Results   Component Value Date/Time    Sodium 142 12/03/2019 11:16 AM    Potassium 4.6 12/03/2019 11:16 AM    Chloride 102 12/03/2019 11:16 AM    CO2 25 12/03/2019 11:16 AM    Anion gap 8 09/20/2016 04:41 AM    Glucose 107 (H) 12/03/2019 11:16 AM    BUN 13 12/03/2019 11:16 AM    Creatinine 1.09 (H) 12/03/2019 11:16 AM    BUN/Creatinine ratio 12 12/03/2019 11:16 AM    GFR est AA 60 12/03/2019 11:16 AM    GFR est non-AA 52 (L) 12/03/2019 11:16 AM    Calcium 9.4 12/03/2019 11:16 AM    Bilirubin, total 0.4 12/03/2019 11:16 AM    AST (SGOT) 23 12/03/2019 11:16 AM    Alk. phosphatase 90 12/03/2019 11:16 AM    Protein, total 6.5 12/03/2019 11:16 AM    Albumin 4.1 12/03/2019 11:16 AM    Globulin 3.5 08/14/2013 08:44 AM    A-G Ratio 1.7 12/03/2019 11:16 AM    ALT (SGPT) 11 12/03/2019 11:16 AM       EKG:     Assessment:     Assessment:      1. Atherosclerosis of native coronary artery of native heart without angina pectoris    2. Essential hypertension        No orders of the defined types were placed in this encounter. Plan:     Patient presents for f/u---seen last Monday 12/23/19 for post cardiac cath f/u---she was orthostatic in clinic and c/o lightheadedness. Dr Collin Eagle stopped her Imdur and lisinopril. Today she states feeling better, home BP 130s/70s, reports resolution of lightheadedness. 1. Orthostatic hypotension and dizziness, resolved since dc imdur and lisinopril. Recall that she was on Imdur 60 mg and Lisinopril 20 mg.   2. CAD s/p LCX ARCHANA 1/2012. S/p re intervention 10/2012. S/p RCA  PCI 09/2016. No significant CAD on recent cath. Normal LVEF. 3. DM: On Insulin pump followed by DR Oscar Ahuja    At follow up, if BP allows, will likely restart Lisinopril for renovascular protection as she has DM.        Continue current care and f/u with Dr Ashlee Garza in 6 weeks      Sary Bhakta NP

## 2020-02-03 ENCOUNTER — OFFICE VISIT (OUTPATIENT)
Dept: CARDIOLOGY CLINIC | Age: 70
End: 2020-02-03

## 2020-02-03 VITALS
SYSTOLIC BLOOD PRESSURE: 122 MMHG | RESPIRATION RATE: 16 BRPM | OXYGEN SATURATION: 95 % | HEIGHT: 67 IN | BODY MASS INDEX: 23.21 KG/M2 | WEIGHT: 147.9 LBS | DIASTOLIC BLOOD PRESSURE: 64 MMHG | HEART RATE: 78 BPM

## 2020-02-03 DIAGNOSIS — E78.2 MIXED HYPERLIPIDEMIA: Primary | ICD-10-CM

## 2020-02-03 DIAGNOSIS — I73.9 PAD (PERIPHERAL ARTERY DISEASE) (HCC): ICD-10-CM

## 2020-02-03 DIAGNOSIS — I25.10 ATHEROSCLEROSIS OF NATIVE CORONARY ARTERY OF NATIVE HEART WITHOUT ANGINA PECTORIS: ICD-10-CM

## 2020-02-03 DIAGNOSIS — I10 ESSENTIAL HYPERTENSION: Chronic | ICD-10-CM

## 2020-02-03 DIAGNOSIS — Z98.61 S/P PTCA (PERCUTANEOUS TRANSLUMINAL CORONARY ANGIOPLASTY): Chronic | ICD-10-CM

## 2020-02-03 NOTE — PROGRESS NOTES
2/3/2020 9:52 AM      Subjective:     Kathleen Eastman is here for f/u visit. Feeling better since stopping lisinopril and imdur. She denies chest pain, chest pressure/discomfort, dyspnea, palpitations, irregular heart beats, near-syncope, syncope, fatigue, orthopnea, paroxysmal nocturnal dyspnea, exertional chest pressure/discomfort, claudication, lower extremity edema. Visit Vitals  /64 (BP 1 Location: Right arm, BP Patient Position: Sitting)   Pulse 78   Resp 16   Ht 5' 7\" (1.702 m)   Wt 147 lb 14.4 oz (67.1 kg)   SpO2 95%   BMI 23.16 kg/m²     Current Outpatient Medications   Medication Sig    trospium (SANCTURA) 20 mg tablet Take 60 mg by mouth two (2) times a day.  clopidogrel (PLAVIX) 75 mg tab TAKE 1 TABLET BY MOUTH ONCE DAILY    alendronate (FOSAMAX) 10 mg tablet Take 10 mg by mouth daily.  simvastatin (ZOCOR) 40 mg tablet Take 40 mg by mouth daily.  gabapentin (NEURONTIN) 300 mg capsule two (2) times a day.  insulin lispro (HUMALOG U-100 INSULIN) 100 unit/mL injection by SubCUTAneous route. INSULIN PUMP    nitroglycerin (NITROSTAT) 0.4 mg SL tablet 1 Tab by SubLINGual route every five (5) minutes as needed for Chest Pain.  calcium 500 mg tab Take 1 Tab by mouth daily.  cholecalciferol, vitamin D3, (VITAMIN D3) 4,000 unit cap Take  by mouth daily.  levothyroxine (SYNTHROID) 50 mcg tablet Take 50 mcg by mouth daily (before breakfast).  CINNAMON BARK (CINNAMON PO) Take 1,000 mg by mouth daily.  metoprolol succinate (TOPROL-XL) 25 mg XL tablet Take 0.5 Tabs by mouth nightly. (Patient not taking: Reported on 2/3/2020)     No current facility-administered medications for this visit.           Objective:      Visit Vitals  /64 (BP 1 Location: Right arm, BP Patient Position: Sitting)   Pulse 78   Resp 16   Ht 5' 7\" (1.702 m)   Wt 147 lb 14.4 oz (67.1 kg)   SpO2 95%   BMI 23.16 kg/m²       Data Review:     EKG: Normal sinus rhythm, no acute st/t changes    Reviewed and/or ordered active problem list, medication list tests    Past Medical History:   Diagnosis Date    Arthritis     BILAT HANDS    CAD (coronary artery disease)     high cholesterol; heart murmur    Cancer (HCC)     uterine    Chronic kidney disease     Diabetes [facility]     Endocrine disease     hypothyroidism    Hypertension     Neurological disorder     TIA    Other ill-defined conditions(799.89)     PVD; benign right breast tumor removed    Peripheral vascular angioplasty status 8/14/2013 8/14/13 s/p angioplasty L RFA    Stroke ([facility]     TIA  X3 NO RESIDUAL    Thyroid disease     HYPOTHYROID      Past Surgical History:   Procedure Laterality Date    BREAST SURGERY PROCEDURE UNLISTED      tumor removed    CARDIAC SURG PROCEDURE UNLIST      CARDIAC STENT  X2    HX ANKLE FRACTURE TX      pins and plates right ankle    HX BREAST BIOPSY Right     negative  1998    HX GYN      hysterectomy    HX OTHER SURGICAL      fem pop bypass on left     Allergies   Allergen Reactions    Pcn [Penicillins] Anaphylaxis    Codeine Palpitations    Ivp Dye [Fd And C Blue No.1] Nausea and Vomiting     \"I was told in [facility] that it would stop my kidney up if I got it again. \"      Family History   Problem Relation Age of Onset   [facility] Cancer Mother     Hypertension Sister     Hypertension Sister     Heart Disease Brother 72    Stroke Sister         x2 sisters    Diabetes Father     Cancer Father       Social History     Socioeconomic History    Marital status:      Spouse name: Kim Davison    Number of children: 11    Years of education: Not on file    Highest education level: Not on file   Occupational History     Employer: NOT EMPLOYED   Social Needs    Financial resource strain: Somewhat hard    Food insecurity:     Worry: Never true     Inability: Never true    Transportation needs:     Medical: Not on file     Non-medical: Not on file   Tobacco Use    Smoking status: Former Smoker     Packs/day: 5.00     Years: 30.00     Pack years: 150.00     Last attempt to quit: 2000     Years since quittin.4    Smokeless tobacco: Never Used   Substance and Sexual Activity    Alcohol use: No     Alcohol/week: 0.0 standard drinks    Drug use: No    Sexual activity: Not on file   Lifestyle    Physical activity:     Days per week: Not on file     Minutes per session: Not on file    Stress: Not on file   Relationships    Social connections:     Talks on phone: Not on file     Gets together: Not on file     Attends Quaker service: Not on file     Active member of club or organization: Not on file     Attends meetings of clubs or organizations: Not on file     Relationship status: Not on file    Intimate partner violence:     Fear of current or ex partner: Not on file     Emotionally abused: Not on file     Physically abused: Not on file     Forced sexual activity: Not on file   Other Topics Concern     Service Not Asked    Blood Transfusions Not Asked    Caffeine Concern Not Asked    Occupational Exposure Not Asked   L[employer] Hazards Not Asked    Sleep Concern Not Asked    Stress Concern Not Asked    Weight Concern Not Asked    Special Diet Not Asked    Back Care Not Asked    Exercise Not Asked    Bike Helmet Not Asked    Seat Belt Not Asked    Self-Exams Not Asked   Social History Narrative    Not on file         Review of Systems     General: Not Present- Anorexia, Chills, Dietary Changes, Fatigue, Fever, Medication Changes, Night Sweats, Weight Gain > 10lbs. and Weight Loss > 10lbs. .  Skin: Not Present- Bruising and Excessive Sweating. HEENT: Not Present- Headache, Visual Loss and Vertigo. Respiratory: Not Present- Cough, Decreased Exercise Tolerance, Difficulty Breathing, Snoring and Wheezing.   Cardiovascular: Not Present- Abnormal Blood Pressure, Chest Pain, Claudications, Difficulty Breathing On Exertion, Edema, Fainting / Blacking Out, Irregular Heart Beat, Night Cramps, Orthopnea, Palpitations, Paroxysmal Nocturnal Dyspnea, Rapid Heart Rate, Shortness of Breath and Swelling of Extremities. Gastrointestinal: Not Present- Black, Tarry Stool, Bloody Stool, Diarrhea, Hematemesis, Rectal Bleeding and Vomiting. Musculoskeletal: Not Present- Muscle Pain and Muscle Weakness. Neurological: Not Present- Dizziness. Psychiatric: Not Present- Depression. Endocrine: Not Present- Cold Intolerance, Heat Intolerance and Thyroid Problems. Hematology: Not Present- Abnormal Bleeding, Anemia, Blood Clots and Easy Bruising.       Physical Exam   The physical exam findings are as follows:       General   Mental Status - Alert. General Appearance - Not in acute distress. Chest and Lung Exam   Inspection: Accessory muscles - No use of accessory muscles in breathing. Auscultation:   Breath sounds: - Normal.      Cardiovascular   Inspection: Jugular vein - Bilateral - Inspection Normal.  Palpation/Percussion:   Apical Impulse: - Normal.  Auscultation: Rhythm - Regular. Heart Sounds - S1 WNL and S2 WNL. No S3 or S4. Murmurs & Other Heart Sounds: Auscultation of the heart reveals - No Murmurs. Carotid arteries - No Carotid bruit. Peripheral Vascular   Upper Extremity: Inspection - Bilateral - No Cyanotic nailbeds or Digital clubbing. Lower Extremity:   Palpation: Edema - Bilateral - No edema. Assessment:       ICD-10-CM ICD-9-CM    1. Mixed hyperlipidemia E78.2 272.2 AMB POC EKG ROUTINE W/ 12 LEADS, INTER & REP   2. Essential hypertension I10 401.9    3. PAD (peripheral artery disease) ([facility]) I73.9 443.9    4. S/P PTCA (percutaneous transluminal coronary angioplasty) Z98.61 V45.82    5. Atherosclerosis of native coronary artery of native heart without angina pectoris I25.10 414.01        Plan:     1. Orthostatic hypotension and dizziness: resolved since discontinuing lisinopril and imdur. Monitor. Has not been on BB for long time due to intolerance.    2. CAD s/p LCX ARCHANA 1/2012. S/p re intervention 10/2012. S/p RCA  PCI 09/2016. No significant CAD on last cath 12/2019. Normal LVEF. 3. HTN: controlled. 4. Hyperlipidemia: on statin.    5. ATHEROSCLEROSIS OF NATIVE ARTERIES OF THE EXTREMITIES WITH INTERMITTENT CLAUDICATION:     S/p right SFA atherectomy and DCB PTA 10/2017.    S/p b/l iliac stents 7/2014.    s/p left SFA laser athetectomy and stent 9/2011  s/p left SFA angioplasty for ISR 1/2012.   s/p left SFA redo laser atherectomy and PTA 10/2012  S/p left SFA angioplasty 8/2013

## 2020-02-03 NOTE — PROGRESS NOTES
1. Have you been to the ER, urgent care clinic since your last visit? Hospitalized since your last visit? No    2. Have you seen or consulted any other health care providers outside of the [address] [place] [address] since your last visit? Include any pap smears or colon screening. No    Chief Complaint   Patient presents with    Dizziness     6 wk f/u    Hypertension     6 wk f/u     Pt stated that dizziness is not as bad since last visit. Denies dizziness now.   BP readings: 120s/60s-70s  Blood sugar this morning, fasting - 250

## 2020-03-09 ENCOUNTER — TELEPHONE (OUTPATIENT)
Dept: CARDIOLOGY CLINIC | Age: 70
End: 2020-03-09

## 2020-03-11 RX ORDER — CLOPIDOGREL BISULFATE 75 MG/1
TABLET ORAL
Qty: 90 TAB | Refills: 1 | Status: SHIPPED | OUTPATIENT
Start: 2020-03-11 | End: 2021-05-11

## 2020-03-11 NOTE — TELEPHONE ENCOUNTER
**CHANGE PHARMACY**    clopidogrel (PLAVIX) 75 mg tab    Please send to luc[facility] in [place], their phone number is  [phone]    thanks

## 2020-04-08 ENCOUNTER — TELEPHONE (OUTPATIENT)
Dept: CARDIOLOGY CLINIC | Age: 70
End: 2020-04-08

## 2020-04-08 NOTE — TELEPHONE ENCOUNTER
----- Message from [facility], NP sent at 4/8/2020 12:03 PM EDT -----  Regarding: Lipids  Myehsa,    I received recent labwork done for the patient on 4/7/2020. LDL is 104. Our goal for her is < 70. She is on simvastatin 40 mg daily. Ideally, I would have her take lipitor 40 mg daily instead. This is a more effective statin and can help bring LDL to goal.  If she does not want to switch, then will have to add Zetia 10 mg daily, as she is already on max dose simvastatin. Thanks,  [facility] pt and left message to call me back. Called pt,verified pt with two pt identifiers, advised pt her LDL is elevated at 104 and should be 70 or lower. Advised we can change her medication to something new or add another medication to her simvastatin. She wanted to add another medication to the simvastatin. Advised this will be Zetia 10 mg daily. Verified pharmacy and advised 24 hours to receive. Advised pt to continue her simvastatin and start the Zetia 10 mg daily also. Pt verbalized understanding. Sent refill for Zetia to NP-4/13/20.

## 2020-04-13 RX ORDER — EZETIMIBE 10 MG/1
10 TABLET ORAL DAILY
Qty: 30 TAB | Refills: 5 | Status: ON HOLD | OUTPATIENT
Start: 2020-04-13 | End: 2021-07-09

## 2020-04-13 RX ORDER — EZETIMIBE 10 MG/1
TABLET ORAL
COMMUNITY
End: 2020-04-13 | Stop reason: SDUPTHER

## 2020-06-19 NOTE — PROGRESS NOTES
1. Have you been to the ER, urgent care clinic since your last visit? Hospitalized since your last visit? No.    2. Have you seen or consulted any other health care providers outside of the [address] [place] [address] since your last visit? Include any pap smears or colon screening.    No.      4 MONTH F/U- VERY WEAK AND FATIGUED, SWELLING IN LEGS-  2 MONTHS AGO SYNCOPE X 1--PHARMACY VERIFIED

## 2020-06-23 ENCOUNTER — CLINICAL SUPPORT (OUTPATIENT)
Dept: CARDIOLOGY CLINIC | Age: 70
End: 2020-06-23

## 2020-06-23 ENCOUNTER — OFFICE VISIT (OUTPATIENT)
Dept: CARDIOLOGY CLINIC | Age: 70
End: 2020-06-23

## 2020-06-23 VITALS
SYSTOLIC BLOOD PRESSURE: 126 MMHG | RESPIRATION RATE: 16 BRPM | OXYGEN SATURATION: 95 % | WEIGHT: 155.7 LBS | DIASTOLIC BLOOD PRESSURE: 72 MMHG | HEIGHT: 67 IN | BODY MASS INDEX: 24.44 KG/M2 | HEART RATE: 84 BPM

## 2020-06-23 DIAGNOSIS — Z98.61 S/P PTCA (PERCUTANEOUS TRANSLUMINAL CORONARY ANGIOPLASTY): Chronic | ICD-10-CM

## 2020-06-23 DIAGNOSIS — I10 ESSENTIAL HYPERTENSION: Chronic | ICD-10-CM

## 2020-06-23 DIAGNOSIS — G45.9 TIA (TRANSIENT ISCHEMIC ATTACK): ICD-10-CM

## 2020-06-23 DIAGNOSIS — I25.10 ATHEROSCLEROSIS OF NATIVE CORONARY ARTERY OF NATIVE HEART WITHOUT ANGINA PECTORIS: Primary | ICD-10-CM

## 2020-06-23 DIAGNOSIS — R42 DIZZINESS: Primary | ICD-10-CM

## 2020-06-23 DIAGNOSIS — E78.2 MIXED HYPERLIPIDEMIA: ICD-10-CM

## 2020-06-23 DIAGNOSIS — R42 DIZZINESS: ICD-10-CM

## 2020-06-23 DIAGNOSIS — I73.9 PAD (PERIPHERAL ARTERY DISEASE) (HCC): ICD-10-CM

## 2020-06-23 NOTE — PROGRESS NOTES
Chief Complaint   Patient presents with    Cholesterol Problem     4m f/u, Pt c/o dizziness, fatigue.     Hypertension

## 2020-06-23 NOTE — PROGRESS NOTES
Patient received a 30 day event monitor. Instructions given verbally as well as an instruction sheet. Pt verbalized understanding.     [facility] Event Monitoring

## 2020-06-23 NOTE — PROGRESS NOTES
Valeria Plasencia, FNP-BC    Subjective/HPI:     Delma Jurado is a 77 y.o. female is here for routine f/u. She has a PMHx of CAD, PAD, CVA, DM2, hypothyroidism, HTN and HLD. She reports dizziness symptoms. She had a syncopal episode recently, however her  states this episode happened before she came off her BP meds. She recalls going to the bathroom and as she reached for her robe, the room started spinning and she \"went down\"  She states her dizziness is getting worse  She had recent labs by endo and everything was normal, except for kidney function, which she just recently repeated and was fine. PCP Provider  Gulshan Polk MD    Past Medical History:   Diagnosis Date    Arthritis     BILAT HANDS    CAD (coronary artery disease)     high cholesterol; heart murmur    Cancer ([address]     uterine    Chronic kidney disease     Diabetes [facility]     Endocrine disease     hypothyroidism    Hypertension     Neurological disorder     TIA    Other ill-defined conditions(799.89)     PVD; benign right breast tumor removed    Peripheral vascular angioplasty status 8/14/2013 8/14/13 s/p angioplasty L RFA    Stroke (HCC)     TIA  X3 NO RESIDUAL    Thyroid disease     HYPOTHYROID        Allergies   Allergen Reactions    Pcn [Penicillins] Anaphylaxis    Codeine Palpitations    Ivp Dye [Fd And C Blue No.1] Nausea and Vomiting     \"I was told in [place] that it would stop my kidney up if I got it again. \"        Outpatient Encounter Medications as of 6/23/2020   Medication Sig Dispense Refill    ezetimibe (Zetia) 10 mg tablet Take 1 Tab by mouth daily. 30 Tab 5    clopidogreL (PLAVIX) 75 mg tab TAKE 1 TABLET BY MOUTH ONCE DAILY 90 Tab 1    trospium (SANCTURA) 20 mg tablet Take 60 mg by mouth two (2) times a day.  alendronate (FOSAMAX) 10 mg tablet Take 10 mg by mouth daily.  simvastatin (ZOCOR) 40 mg tablet Take 40 mg by mouth daily.       gabapentin (NEURONTIN) 300 mg capsule two (2) times a day.  insulin lispro (HUMALOG U-100 INSULIN) 100 unit/mL injection by SubCUTAneous route. INSULIN PUMP      nitroglycerin (NITROSTAT) 0.4 mg SL tablet 1 Tab by SubLINGual route every five (5) minutes as needed for Chest Pain. 25 Tab 1    calcium 500 mg tab Take 1 Tab by mouth daily.  cholecalciferol, vitamin D3, (VITAMIN D3) 4,000 unit cap Take  by mouth daily.  levothyroxine (SYNTHROID) 50 mcg tablet Take 50 mcg by mouth daily (before breakfast).  CINNAMON BARK (CINNAMON PO) Take 1,000 mg by mouth daily. No facility-administered encounter medications on file as of 6/23/2020. Review of Symptoms:    Review of Systems   Constitutional: Negative for chills, fever and weight loss. HENT: Negative for nosebleeds. Eyes: Negative for blurred vision and double vision. Respiratory: Negative for cough, shortness of breath and wheezing. Cardiovascular: Negative for chest pain, palpitations, orthopnea, leg swelling and PND. Gastrointestinal: Negative for abdominal pain, blood in stool, diarrhea, nausea and vomiting. Musculoskeletal: Negative for joint pain. Skin: Negative for rash. Neurological: Positive for dizziness. Negative for tingling and loss of consciousness. Endo/Heme/Allergies: Does not bruise/bleed easily. Physical Exam:      General: Well developed, in no acute distress, cooperative and alert  HEENT: No carotid bruits, no JVD, trach is midline. Neck Supple, PEERL, EOM intact. Heart:  reg rate and rhythm; normal S1/S2; no murmurs, no gallops or rubs. Respiratory: Clear bilaterally x 4, no wheezing or rales  Abdomen:   Soft, non-tender, no distention, no masses. + BS. Extremities:  Normal cap refill, no cyanosis, atraumatic. No edema. Neuro: A&Ox3, speech clear, gait stable. Skin: Skin color is normal. No rashes or lesions.  Non diaphoretic    Visit Vitals  /66 (BP 1 Location: Right arm, BP Patient Position: Sitting) Pulse 84   Resp 16   Ht 5' 7\" (1.702 m)   Wt 155 lb 11.2 oz (70.6 kg)   SpO2 95%   BMI 24.39 kg/m²       ECG: sinus rhythm    Cardiology Labs:    Lab Results   Component Value Date/Time    Cholesterol, total 174 12/03/2019 11:16 AM    HDL Cholesterol 68 12/03/2019 11:16 AM    LDL, calculated 96 12/03/2019 11:16 AM    LDL, calculated 83 02/29/2016 09:32 AM    LDL, calculated 73 04/11/2014 09:53 AM    VLDL, calculated 10 12/03/2019 11:16 AM    CHOL/HDL Ratio 1.9 09/07/2010 10:21 AM       Lab Results   Component Value Date/Time    Hemoglobin A1c 6.4 (H) 02/29/2016 09:32 AM       Lab Results   Component Value Date/Time    Sodium 142 12/03/2019 11:16 AM    Potassium 4.6 12/03/2019 11:16 AM    Chloride 102 12/03/2019 11:16 AM    CO2 25 12/03/2019 11:16 AM    Glucose 107 (H) 12/03/2019 11:16 AM    BUN 13 12/03/2019 11:16 AM    Creatinine 1.09 (H) 12/03/2019 11:16 AM    BUN/Creatinine ratio 12 12/03/2019 11:16 AM    GFR est AA 60 12/03/2019 11:16 AM    GFR est non-AA 52 (L) 12/03/2019 11:16 AM    Calcium 9.4 12/03/2019 11:16 AM    Anion gap 8 09/20/2016 04:41 AM    Bilirubin, total 0.4 12/03/2019 11:16 AM    ALT (SGPT) 11 12/03/2019 11:16 AM    Alk. phosphatase 90 12/03/2019 11:16 AM    Protein, total 6.5 12/03/2019 11:16 AM    Albumin 4.1 12/03/2019 11:16 AM    Globulin 3.5 08/14/2013 08:44 AM    A-G Ratio 1.7 12/03/2019 11:16 AM          Assessment:       ICD-10-CM ICD-9-CM    1. Atherosclerosis of native coronary artery of native heart without angina pectoris I25.10 414.01    2. Essential hypertension I10 401.9    3. Mixed hyperlipidemia E78.2 272.2    4. Dizziness R42 780.4 AMB POC EKG ROUTINE W/ 12 LEADS, INTER & REP      DUPLEX CAROTID BILATERAL      LOOP MONITOR   5. PAD (peripheral artery disease) ([facility]) I73.9 443.9    6. S/P PTCA (percutaneous transluminal coronary angioplasty) Z98.61 V45.82         Plan:     1.  Atherosclerosis of native coronary artery of native heart without angina pectoris  Hx of ARCHANA to LCx and RCA . Cath in 12/2019 with 50% LCx stenosis, not significant by FFR (0.95)  Continue ASA, statin therapy  Intolerant to BB therapy. Nitrates & CCB cause orthostatic hypotension. 2. Essential hypertension  BP today controlled  Orthostatics negative    3. Mixed hyperlipidemia  ; on simvastatin daily. Has since started Zetia. Plan to repeat labs in 3 months    4. Dizziness  Chronic complaint; did improve after coming off anti-hypertensives  Syncopal episode recently, however  states this was prior to coming off her anti-hypertensives  Will evaluate with 30-day event monitor, check carotid duplex  Recent cath without obstructive disease; echo done 10/2019 with preserved ejection fraction 55-60%, mild AI  Consider neuro workup if testing normal    5. PAD (peripheral artery disease) (HCC)  Without claudication symptoms. Continue medical management  Previous interventions include:  S/p right SFA atherectomy and DCB PTA 10/2017.    S/p b/l iliac stents 7/2014.    s/p left SFA laser athetectomy and stent 9/2011  s/p left SFA angioplasty for ISR 1/2012.   s/p left SFA redo laser atherectomy and PTA 10/2012  S/p left SFA angioplasty 8/2013    F/u with Dr. Kim Rodriguez in 6 months    [facility], NP       Patient seen and examined by me with nurse practitioner. I personally performed all components of the history, physical, and medical decision making and agree with the assessment and plan as noted. Episode of syncope appears to be vasovagal clinically.      Ted Ayers MD

## 2020-07-16 ENCOUNTER — TELEPHONE (OUTPATIENT)
Dept: CARDIOLOGY CLINIC | Age: 70
End: 2020-07-16

## 2020-07-16 NOTE — TELEPHONE ENCOUNTER
----- Message from Leydi George NP sent at 7/15/2020  3:09 PM EDT -----  Myesha,    Please call patient and inform that carotid ultrasound is normal.    Thanks,  Luis F Berg pt , spoke to  Barbara Rosario on [facility], advised pt's carotid doppler result is normal. He verbalized understanding.

## 2020-07-30 ENCOUNTER — OFFICE VISIT (OUTPATIENT)
Dept: CARDIOLOGY CLINIC | Age: 70
End: 2020-07-30

## 2020-07-30 VITALS
OXYGEN SATURATION: 96 % | WEIGHT: 156.5 LBS | HEART RATE: 80 BPM | DIASTOLIC BLOOD PRESSURE: 72 MMHG | BODY MASS INDEX: 24.56 KG/M2 | HEIGHT: 67 IN | RESPIRATION RATE: 18 BRPM | SYSTOLIC BLOOD PRESSURE: 142 MMHG

## 2020-07-30 DIAGNOSIS — Z98.61 S/P PTCA (PERCUTANEOUS TRANSLUMINAL CORONARY ANGIOPLASTY): Primary | Chronic | ICD-10-CM

## 2020-07-30 DIAGNOSIS — I48.0 PAF (PAROXYSMAL ATRIAL FIBRILLATION) (HCC): ICD-10-CM

## 2020-07-30 DIAGNOSIS — I73.9 PAD (PERIPHERAL ARTERY DISEASE) (HCC): ICD-10-CM

## 2020-07-30 DIAGNOSIS — Z95.5 S/P CORONARY ARTERY STENT PLACEMENT: ICD-10-CM

## 2020-07-30 DIAGNOSIS — I25.10 ATHEROSCLEROSIS OF NATIVE CORONARY ARTERY OF NATIVE HEART WITHOUT ANGINA PECTORIS: ICD-10-CM

## 2020-07-30 DIAGNOSIS — E78.2 MIXED HYPERLIPIDEMIA: Chronic | ICD-10-CM

## 2020-07-30 DIAGNOSIS — Z98.62 PERIPHERAL VASCULAR ANGIOPLASTY STATUS: ICD-10-CM

## 2020-07-30 DIAGNOSIS — I10 ESSENTIAL HYPERTENSION: Chronic | ICD-10-CM

## 2020-07-30 NOTE — PROGRESS NOTES
1. Have you been to the ER, urgent care clinic since your last visit? Hospitalized since your last visit? No    2. Have you seen or consulted any other health care providers outside of the [address] [place] [address] since your last visit? Include any pap smears or colon screening. No    Chief Complaint   Patient presents with    Results     Discuss carotid duplex results. Vandana Salmeron NP, [facility], ph # [phone], fax # [phone].

## 2020-07-30 NOTE — PROGRESS NOTES
7/30/2020 3:45 PM      Subjective:     Panda Dyer   denies chest pain, chest pressure/discomfort, dyspnea, palpitations, irregular heart beats, near-syncope, syncope, fatigue, orthopnea, paroxysmal nocturnal dyspnea, exertional chest pressure/discomfort, claudication, lower extremity edema, tachypnea. Visit Vitals  /72 (BP 1 Location: Right arm, BP Patient Position: Sitting)   Pulse 80   Resp 18   Ht 5' 7\" (1.702 m)   Wt 156 lb 8 oz (71 kg)   SpO2 96%   BMI 24.51 kg/m²     Current Outpatient Medications   Medication Sig    ezetimibe (Zetia) 10 mg tablet Take 1 Tab by mouth daily.  clopidogreL (PLAVIX) 75 mg tab TAKE 1 TABLET BY MOUTH ONCE DAILY    trospium (SANCTURA) 20 mg tablet Take 60 mg by mouth two (2) times a day.  alendronate (FOSAMAX) 10 mg tablet Take 10 mg by mouth daily.  simvastatin (ZOCOR) 40 mg tablet Take 40 mg by mouth daily.  insulin lispro (HUMALOG U-100 INSULIN) 100 unit/mL injection by SubCUTAneous route. INSULIN PUMP    nitroglycerin (NITROSTAT) 0.4 mg SL tablet 1 Tab by SubLINGual route every five (5) minutes as needed for Chest Pain.  calcium 500 mg tab Take 1 Tab by mouth daily.  cholecalciferol, vitamin D3, (VITAMIN D3) 4,000 unit cap Take  by mouth daily.  levothyroxine (SYNTHROID) 50 mcg tablet Take 50 mcg by mouth daily (before breakfast).  CINNAMON BARK (CINNAMON PO) Take 2,000 mg by mouth daily.  gabapentin (NEURONTIN) 300 mg capsule two (2) times a day. No current facility-administered medications for this visit.           Objective:      Visit Vitals  /72 (BP 1 Location: Right arm, BP Patient Position: Sitting)   Pulse 80   Resp 18   Ht 5' 7\" (1.702 m)   Wt 156 lb 8 oz (71 kg)   SpO2 96%   BMI 24.51 kg/m²       Data Review:     Reviewed and/or ordered active problem list, medication list tests    Past Medical History:   Diagnosis Date    Arthritis     BILAT HANDS    CAD (coronary artery disease)     high cholesterol; heart murmur    Cancer [facility])     uterine    Chronic kidney disease     Diabetes ([facility]     Endocrine disease     hypothyroidism    Hypertension     Neurological disorder     TIA    Other ill-defined conditions(799.89)     PVD; benign right breast tumor removed    Peripheral vascular angioplasty status 2013 s/p angioplasty L RFA    Stroke ([facility]     TIA  X3 NO RESIDUAL    Thyroid disease     HYPOTHYROID      Past Surgical History:   Procedure Laterality Date    BREAST SURGERY PROCEDURE UNLISTED      tumor removed    CARDIAC SURG PROCEDURE UNLIST      CARDIAC STENT  X2    HX ANKLE FRACTURE TX      pins and plates right ankle    HX BREAST BIOPSY Right     negative      HX GYN      hysterectomy    HX OTHER SURGICAL      fem pop bypass on left     Allergies   Allergen Reactions    Pcn [Penicillins] Anaphylaxis    Codeine Palpitations    Ivp Dye [Fd And C Blue No.1] Nausea and Vomiting     \"I was told in [place] that it would stop my kidney up if I got it again. \"      Family History   Problem Relation Age of Onset   [facility] Mother     Hypertension Sister     Hypertension Sister     Heart Disease Brother 72    Stroke Sister         x2 sisters    Diabetes Father     Cancer Father       Social History     Socioeconomic History    Marital status:      Spouse name: Nae Paulino    Number of children: 11    Years of education: Not on file    Highest education level: Not on file   Occupational History     Employer: NOT EMPLOYED   Social Needs    Financial resource strain: Somewhat hard    Food insecurity     Worry: Never true     Inability: Never true    Transportation needs     Medical: Not on file     Non-medical: Not on file   Tobacco Use    Smoking status: Former Smoker     Packs/day: 5.00     Years: 30.00     Pack years: 150.00     Last attempt to quit: 2000     Years since quittin.9    Smokeless tobacco: Never Used   Substance and Sexual Activity    Alcohol use: No     Alcohol/week: 0.0 standard drinks    Drug use: No    Sexual activity: Not on file   Lifestyle    Physical activity     Days per week: Not on file     Minutes per session: Not on file    Stress: Not on file   Relationships    Social connections     Talks on phone: Not on file     Gets together: Not on file     Attends [employer] service: Not on file     Active member of club or organization: Not on file     Attends meetings of clubs or organizations: Not on file     Relationship status: Not on file    Intimate partner violence     Fear of current or ex partner: Not on file     Emotionally abused: Not on file     Physically abused: Not on file     Forced sexual activity: Not on file   Other Topics Concern     Service Not Asked    Blood Transfusions Not Asked    Caffeine Concern Not Asked    Occupational Exposure Not Asked   [place] Hazards Not Asked    Sleep Concern Not Asked    Stress Concern Not Asked    Weight Concern Not Asked    Special Diet Not Asked    Back Care Not Asked    Exercise Not Asked    Bike Helmet Not Asked   [place] Not Asked    Self-Exams Not Asked   Social History Narrative    Not on file         Review of Systems     General: Not Present- Anorexia, Chills, Dietary Changes, Fatigue, Fever, Medication Changes, Night Sweats, Weight Gain > 10lbs. and Weight Loss > 10lbs. .  Skin: Not Present- Bruising and Excessive Sweating. HEENT: Not Present- Headache, Visual Loss and Vertigo. Respiratory: Not Present- Cough, Decreased Exercise Tolerance, Difficulty Breathing, Snoring and Wheezing. Cardiovascular: Not Present- Abnormal Blood Pressure, Chest Pain, Claudications, Difficulty Breathing On Exertion, Edema, Fainting / Blacking Out, Irregular Heart Beat, Night Cramps, Orthopnea, Palpitations, Paroxysmal Nocturnal Dyspnea, Rapid Heart Rate, Shortness of Breath and Swelling of Extremities.   Gastrointestinal: Not Present- Black, Tarry Stool, Bloody Stool, Diarrhea, Hematemesis, Rectal Bleeding and Vomiting. Musculoskeletal: Not Present- Muscle Pain and Muscle Weakness. Neurological: Not Present- Dizziness. Psychiatric: Not Present- Depression. Endocrine: Not Present- Cold Intolerance, Heat Intolerance and Thyroid Problems. Hematology: Not Present- Abnormal Bleeding, Anemia, Blood Clots and Easy Bruising. Physical Exam   The physical exam findings are as follows:       General   Mental Status - Alert. General Appearance - Not in acute distress. Chest and Lung Exam   Inspection: Accessory muscles - No use of accessory muscles in breathing. Auscultation:   Breath sounds: - Normal.      Cardiovascular   Inspection: Jugular vein - Bilateral - Inspection Normal.  Palpation/Percussion:   Apical Impulse: - Normal.  Auscultation: Rhythm - Regular. Heart Sounds - S1 WNL and S2 WNL. No S3 or S4. Murmurs & Other Heart Sounds: Auscultation of the heart reveals - No Murmurs. Carotid arteries - No Carotid bruit. Peripheral Vascular   Upper Extremity: Inspection - Bilateral - No Cyanotic nailbeds or Digital clubbing. Lower Extremity:   Palpation: Edema - Bilateral - No edema. Assessment:       ICD-10-CM ICD-9-CM    1. S/P PTCA (percutaneous transluminal coronary angioplasty)  Z98.61 V45.82    2. Mixed hyperlipidemia  E78.2 272.2    3. S/P coronary artery stent placement  Z95.5 V45.82    4. Peripheral vascular angioplasty status  Z98.62 V45.89    5. PAD (peripheral artery disease) ([facility])  I73.9 443.9    6. Atherosclerosis of native coronary artery of native heart without angina pectoris  I25.10 414.01    7. Essential hypertension  I10 401.9    8. PAF (paroxysmal atrial fibrillation) ([facility])  I48.0 427.31        Plan:     1. Atherosclerosis of native coronary artery of native heart without angina pectoris  Hx of ARCHANA to LCx and RCA .   Cath in 12/2019 with 50% LCx stenosis, not significant by FFR (0.95)  Continue ASA, statin therapy  Intolerant to BB therapy. Nitrates & CCB cause orthostatic hypotension.     2. Essential hypertension  BP today controlled  Orthostatics negative     3. Mixed hyperlipidemia  ; on simvastatin daily. Has since started Zetia. Will check labs with next visit.      4. Dizziness  Chronic complaint; did improve after coming off anti-hypertensives  Event monitor d/w pt. No significant CAD on recent cath. Clinically feels better. Will monitor for now. Denies any syncope.      5. PAD (peripheral artery disease) ([facility])  Without claudication symptoms. Continue medical management  Previous interventions include:  S/p right SFA atherectomy and DCB PTA 10/2017.    S/p b/l iliac stents 7/2014.    s/p left SFA laser athetectomy and stent 9/2011  s/p left SFA angioplasty for ISR 1/2012.   s/p left SFA redo laser atherectomy and PTA 10/2012  S/p left SFA angioplasty 8/2013    6. Short run of a. Fib noted on monitor. Will add DOAC. Due to side effects from medicine in the past and short burst of a. Fib, will hold off any med for now. If more symptomatic then may need EP evaluation.  D/w pt.

## 2020-07-31 ENCOUNTER — DOCUMENTATION ONLY (OUTPATIENT)
Dept: CARDIOLOGY CLINIC | Age: 70
End: 2020-07-31

## 2020-07-31 ENCOUNTER — TELEPHONE (OUTPATIENT)
Dept: CARDIOLOGY CLINIC | Age: 70
End: 2020-07-31

## 2020-07-31 NOTE — TELEPHONE ENCOUNTER
----- Message from Brett Saini MD sent at 7/30/2020  4:20 PM EDT -----  Inform her I forgot to mention that I want her to take blood thinner to protect her from stroke due to short run of a. Fib noted, as we discussed. Will call in eliquis. Called pt,verified pt with two pt identifiers, advised pt that  wants her to start Eliquis 5 mg bid since she had a short run of Afib-irregular heartbeat, he wants to protect her heart from a stroke. Verified Eliquis was sent to pharmacy. Pt verbalized understanding and had no further questions.

## 2020-09-03 ENCOUNTER — TELEPHONE (OUTPATIENT)
Dept: CARDIOLOGY CLINIC | Age: 70
End: 2020-09-03

## 2020-09-03 NOTE — TELEPHONE ENCOUNTER
Message   Received: Today   Message Contents   MD Alycia Perez LPN    Caller: Unspecified (Today,  9:37 AM)               Just let them know about risk of CVA. Other option is seeing ENT. Otherwise can stop as long as understand risks. Called  , Wali Bob on [place], advised of stopping Eliquis but she will run a risk of possible stroke-could be massive or small- we do not know-advised she will have that risk if she stops the Eliquis-that is her or his decision. Advised another option is seeing an ENT. Wali Bob advised they have just come from one and they noted no issues with her nose, throat or anything they can see. He advised he will let her know and discuss with pt about coming off of Eliquis and the risk. Advised to call us back if needed. Jamarcus verbalized understanding.

## 2020-09-03 NOTE — TELEPHONE ENCOUNTER
Returned call, spoke to  Rylie Young on [place], he advised pt has been getting nosebleeds since shortly after starting the Eliquis. No other abnormal bleeding. She is not picking or blowing nose. She can just be sitting and her nose will start to bleed. She can get it to stop, will usually fill up some paper towels before it stops. It will happen every day or 2 days with the bleeding. He wants to know if she can stop the Eliquis. Advised to continue the Eliquis and I will send to  for any further advice. Pt verbalized understanding.

## 2020-09-03 NOTE — TELEPHONE ENCOUNTER
Pt was just given a prescription for eliquis, her  called saying it is causing nose bleeds. Should she continue?  Please give them a call back about their concerns       thanks

## 2020-09-25 ENCOUNTER — OFFICE VISIT (OUTPATIENT)
Dept: NEUROLOGY | Age: 70
End: 2020-09-25
Payer: MEDICARE

## 2020-09-25 VITALS
WEIGHT: 156.8 LBS | TEMPERATURE: 97.9 F | SYSTOLIC BLOOD PRESSURE: 118 MMHG | DIASTOLIC BLOOD PRESSURE: 60 MMHG | OXYGEN SATURATION: 95 % | RESPIRATION RATE: 18 BRPM | BODY MASS INDEX: 24.61 KG/M2 | HEART RATE: 88 BPM | HEIGHT: 67 IN

## 2020-09-25 DIAGNOSIS — R42 DIZZINESS: ICD-10-CM

## 2020-09-25 DIAGNOSIS — E78.5 HYPERLIPIDEMIA LDL GOAL <70: ICD-10-CM

## 2020-09-25 DIAGNOSIS — E11.42 DIABETIC POLYNEUROPATHY ASSOCIATED WITH TYPE 2 DIABETES MELLITUS (HCC): Primary | ICD-10-CM

## 2020-09-25 DIAGNOSIS — D47.2 MONOCLONAL PARAPROTEINEMIA: ICD-10-CM

## 2020-09-25 DIAGNOSIS — I48.0 PAROXYSMAL ATRIAL FIBRILLATION (HCC): ICD-10-CM

## 2020-09-25 PROCEDURE — G8399 PT W/DXA RESULTS DOCUMENT: HCPCS | Performed by: PSYCHIATRY & NEUROLOGY

## 2020-09-25 PROCEDURE — G8510 SCR DEP NEG, NO PLAN REQD: HCPCS | Performed by: PSYCHIATRY & NEUROLOGY

## 2020-09-25 PROCEDURE — G8752 SYS BP LESS 140: HCPCS | Performed by: PSYCHIATRY & NEUROLOGY

## 2020-09-25 PROCEDURE — G8420 CALC BMI NORM PARAMETERS: HCPCS | Performed by: PSYCHIATRY & NEUROLOGY

## 2020-09-25 PROCEDURE — 1090F PRES/ABSN URINE INCON ASSESS: CPT | Performed by: PSYCHIATRY & NEUROLOGY

## 2020-09-25 PROCEDURE — 1100F PTFALLS ASSESS-DOCD GE2>/YR: CPT | Performed by: PSYCHIATRY & NEUROLOGY

## 2020-09-25 PROCEDURE — 3046F HEMOGLOBIN A1C LEVEL >9.0%: CPT | Performed by: PSYCHIATRY & NEUROLOGY

## 2020-09-25 PROCEDURE — G9899 SCRN MAM PERF RSLTS DOC: HCPCS | Performed by: PSYCHIATRY & NEUROLOGY

## 2020-09-25 PROCEDURE — G8536 NO DOC ELDER MAL SCRN: HCPCS | Performed by: PSYCHIATRY & NEUROLOGY

## 2020-09-25 PROCEDURE — G8754 DIAS BP LESS 90: HCPCS | Performed by: PSYCHIATRY & NEUROLOGY

## 2020-09-25 PROCEDURE — 2022F DILAT RTA XM EVC RTNOPTHY: CPT | Performed by: PSYCHIATRY & NEUROLOGY

## 2020-09-25 PROCEDURE — 99204 OFFICE O/P NEW MOD 45 MIN: CPT | Performed by: PSYCHIATRY & NEUROLOGY

## 2020-09-25 PROCEDURE — G8427 DOCREV CUR MEDS BY ELIG CLIN: HCPCS | Performed by: PSYCHIATRY & NEUROLOGY

## 2020-09-25 PROCEDURE — 3288F FALL RISK ASSESSMENT DOCD: CPT | Performed by: PSYCHIATRY & NEUROLOGY

## 2020-09-25 PROCEDURE — 3017F COLORECTAL CA SCREEN DOC REV: CPT | Performed by: PSYCHIATRY & NEUROLOGY

## 2020-12-21 RX ORDER — APIXABAN 5 MG/1
TABLET, FILM COATED ORAL
Qty: 60 TAB | Refills: 0 | Status: SHIPPED | OUTPATIENT
Start: 2020-12-21 | End: 2021-02-15

## 2021-02-01 ENCOUNTER — TELEPHONE (OUTPATIENT)
Dept: CARDIOLOGY CLINIC | Age: 71
End: 2021-02-01

## 2021-02-01 ENCOUNTER — OFFICE VISIT (OUTPATIENT)
Dept: CARDIOLOGY CLINIC | Age: 71
End: 2021-02-01
Payer: MEDICARE

## 2021-02-01 VITALS
RESPIRATION RATE: 18 BRPM | HEIGHT: 67 IN | DIASTOLIC BLOOD PRESSURE: 72 MMHG | WEIGHT: 151.6 LBS | HEART RATE: 70 BPM | SYSTOLIC BLOOD PRESSURE: 134 MMHG | OXYGEN SATURATION: 96 % | BODY MASS INDEX: 23.79 KG/M2

## 2021-02-01 DIAGNOSIS — Z95.5 S/P CORONARY ARTERY STENT PLACEMENT: ICD-10-CM

## 2021-02-01 DIAGNOSIS — I25.10 ATHEROSCLEROSIS OF NATIVE CORONARY ARTERY OF NATIVE HEART WITHOUT ANGINA PECTORIS: Primary | ICD-10-CM

## 2021-02-01 DIAGNOSIS — I48.0 PAF (PAROXYSMAL ATRIAL FIBRILLATION) (HCC): ICD-10-CM

## 2021-02-01 DIAGNOSIS — I10 ESSENTIAL HYPERTENSION: ICD-10-CM

## 2021-02-01 DIAGNOSIS — E78.2 MIXED HYPERLIPIDEMIA: ICD-10-CM

## 2021-02-01 DIAGNOSIS — I73.9 PAD (PERIPHERAL ARTERY DISEASE) (HCC): ICD-10-CM

## 2021-02-01 DIAGNOSIS — Z98.61 S/P PTCA (PERCUTANEOUS TRANSLUMINAL CORONARY ANGIOPLASTY): ICD-10-CM

## 2021-02-01 PROCEDURE — 3288F FALL RISK ASSESSMENT DOCD: CPT | Performed by: INTERNAL MEDICINE

## 2021-02-01 PROCEDURE — G8754 DIAS BP LESS 90: HCPCS | Performed by: INTERNAL MEDICINE

## 2021-02-01 PROCEDURE — 93000 ELECTROCARDIOGRAM COMPLETE: CPT | Performed by: INTERNAL MEDICINE

## 2021-02-01 PROCEDURE — G8536 NO DOC ELDER MAL SCRN: HCPCS | Performed by: INTERNAL MEDICINE

## 2021-02-01 PROCEDURE — G8399 PT W/DXA RESULTS DOCUMENT: HCPCS | Performed by: INTERNAL MEDICINE

## 2021-02-01 PROCEDURE — 1090F PRES/ABSN URINE INCON ASSESS: CPT | Performed by: INTERNAL MEDICINE

## 2021-02-01 PROCEDURE — G8427 DOCREV CUR MEDS BY ELIG CLIN: HCPCS | Performed by: INTERNAL MEDICINE

## 2021-02-01 PROCEDURE — 3017F COLORECTAL CA SCREEN DOC REV: CPT | Performed by: INTERNAL MEDICINE

## 2021-02-01 PROCEDURE — G8510 SCR DEP NEG, NO PLAN REQD: HCPCS | Performed by: INTERNAL MEDICINE

## 2021-02-01 PROCEDURE — G8420 CALC BMI NORM PARAMETERS: HCPCS | Performed by: INTERNAL MEDICINE

## 2021-02-01 PROCEDURE — G8752 SYS BP LESS 140: HCPCS | Performed by: INTERNAL MEDICINE

## 2021-02-01 PROCEDURE — 1100F PTFALLS ASSESS-DOCD GE2>/YR: CPT | Performed by: INTERNAL MEDICINE

## 2021-02-01 PROCEDURE — 99214 OFFICE O/P EST MOD 30 MIN: CPT | Performed by: INTERNAL MEDICINE

## 2021-02-01 RX ORDER — GUAIFENESIN 100 MG/5ML
81 LIQUID (ML) ORAL DAILY
Status: ON HOLD | COMMUNITY
End: 2021-06-04 | Stop reason: SDUPTHER

## 2021-02-01 NOTE — PROGRESS NOTES
[address] [place] [address] [phone]     Subjective:      Sita Lorenzana is a 70 y.o. female is here for routine f/u.  She has pmhx CAD s/p stent, PAD, PAF, HTN, HLD, DM and CVA.  Last seen by us in 7/2020.  She remains in usual state of health.  Has occasional ankle swelling / leg cramps.  She has compression stockings but doesn't wear them.  Denies any cp or sob.    The patient denies chest pain/ shortness of breath, orthopnea, PND, LE edema, palpitations, syncope, or presyncope.       Patient Active Problem List    Diagnosis Date Noted   • PAF (paroxysmal atrial fibrillation) ([facility]) 07/30/2020   • Hypotension 12/23/2019   • SAHNI (dyspnea on exertion) 11/18/2019   • Dizziness 08/13/2015   • Peripheral vascular angioplasty status 08/14/2013   • Angina, class III ([facility]) 06/18/2013   • Angina pectoris ([facility]) 06/07/2013   • S/P coronary artery stent placement 10/05/2012   • SOB (shortness of breath) 10/02/2012   • Atherosclerosis of native artery of extremity with intermittent claudication ([facility]) 10/02/2012   • TIA (transient ischemic attack) 05/22/2012   • Mixed hyperlipidemia 05/22/2012   • DM type 1 (diabetes mellitus, type 1) ([facility]) 05/22/2012   • S/P PTCA (percutaneous transluminal coronary angioplasty) 01/14/2012   • HTN (hypertension) 01/13/2012   • Hypothyroidism 01/13/2012   • Hyperlipidemia 01/13/2012   • Coronary atherosclerosis of native coronary artery 01/05/2012   • Abnormal cardiovascular stress test 01/05/2012   • PAD (peripheral artery disease) ([facility]) 01/05/2012      Sonny Quintana MD  Past Medical History:   Diagnosis Date   • Arthritis     BILAT HANDS   • CAD (coronary artery disease)     high cholesterol; heart murmur   • Cancer ([facility])     uterine   • Chronic kidney disease    • Diabetes ([facility])    • Endocrine disease     hypothyroidism   • Hypertension    • Neurological disorder     TIA   • Other ill-defined conditions(379.79)     PVD; benign right breast tumor  removed    Peripheral vascular angioplasty status 2013 s/p angioplasty L RFA    Stroke ([address]     TIA  X3 NO RESIDUAL    Thyroid disease     HYPOTHYROID      Past Surgical History:   Procedure Laterality Date    HX ANKLE FRACTURE TX      pins and plates right ankle    HX BREAST BIOPSY Right     negative      HX GYN      hysterectomy    HX OTHER SURGICAL      fem pop bypass on left    WI BREAST SURGERY PROCEDURE UNLISTED      tumor removed    WI CARDIAC SURG PROCEDURE UNLIST      CARDIAC STENT  X2     Allergies   Allergen Reactions    Pcn [Penicillins] Anaphylaxis    Codeine Palpitations    Ivp Dye [Fd And C Blue No.1] Nausea and Vomiting     \"I was told [facility] that it would stop my kidney up if I got it again. \"      Family History   Problem Relation Age of Onset   Jewell Ayon Cancer Mother     Hypertension Sister     Hypertension Sister     Heart Disease Brother 72    Stroke Sister         x2 sisters    Diabetes Father     Cancer Father       Social History     Socioeconomic History    Marital status:      Spouse name: Samuel Mcneil    Number of children: 11    Years of education: Not on file    Highest education level: Not on file   Occupational History     Employer: NOT EMPLOYED   Social Needs    Financial resource strain: Somewhat hard    Food insecurity     Worry: Never true     Inability: Never true    Transportation needs     Medical: Not on file     Non-medical: Not on file   Tobacco Use    Smoking status: Former Smoker     Packs/day: 5.00     Years: 30.00     Pack years: 150.00     Quit date: 2000     Years since quittin.4    Smokeless tobacco: Never Used   Substance and Sexual Activity    Alcohol use: No     Alcohol/week: 0.0 standard drinks    Drug use: No    Sexual activity: Not on file   Lifestyle    Physical activity     Days per week: Not on file     Minutes per session: Not on file    Stress: Not on file   Relationships    Social connections Talks on phone: Not on file     Gets together: Not on file     Attends Worship service: Not on file     Active member of club or organization: Not on file     Attends meetings of clubs or organizations: Not on file     Relationship status: Not on file    Intimate partner violence     Fear of current or ex partner: Not on file     Emotionally abused: Not on file     Physically abused: Not on file     Forced sexual activity: Not on file   Other Topics Concern     Service Not Asked    Blood Transfusions Not Asked    Caffeine Concern Not Asked    Occupational Exposure Not Asked   Rejeana Kaci Hazards Not Asked    Sleep Concern Not Asked    Stress Concern Not Asked    Weight Concern Not Asked    Special Diet Not Asked    Back Care Not Asked    Exercise Not Asked    Bike Helmet Not Asked   [address] Not Asked    Self-Exams Not Asked   Social History Narrative    Not on file      Current Outpatient Medications   Medication Sig    aspirin 81 mg chewable tablet Take 81 mg by mouth daily.  Eliquis 5 mg tablet Take 1 tablet by mouth twice daily    trospium (SANCTURA) 20 mg tablet Take 60 mg by mouth two (2) times a day.  alendronate (FOSAMAX) 10 mg tablet Take 10 mg by mouth daily.  simvastatin (ZOCOR) 40 mg tablet Take 40 mg by mouth daily.  gabapentin (NEURONTIN) 300 mg capsule Take 300 mg by mouth three (3) times daily.  insulin lispro (HUMALOG U-100 INSULIN) 100 unit/mL injection by SubCUTAneous route. INSULIN PUMP    nitroglycerin (NITROSTAT) 0.4 mg SL tablet 1 Tab by SubLINGual route every five (5) minutes as needed for Chest Pain.  calcium 500 mg tab Take 1 Tab by mouth daily.  cholecalciferol, vitamin D3, (VITAMIN D3) 4,000 unit cap Take  by mouth daily.  levothyroxine (SYNTHROID) 50 mcg tablet Take 50 mcg by mouth daily (before breakfast).  CINNAMON BARK (CINNAMON PO) Take 2,000 mg by mouth daily.  ezetimibe (Zetia) 10 mg tablet Take 1 Tab by mouth daily.     clopidogreL (PLAVIX) 75 mg tab TAKE 1 TABLET BY MOUTH ONCE DAILY     No current facility-administered medications for this visit. Review of Symptoms:  11 systems reviewed, negative other than as stated in the HPI    Physical ExamPhysical Exam:    Vitals:    02/01/21 1107 02/01/21 1131   BP: (!) 138/58 134/72   Pulse: 70    Resp: 18    SpO2: 96%    Weight: 151 lb 9.6 oz (68.8 kg)    Height: 5' 7\" (1.702 m)      Body mass index is 23.74 kg/m². General PE  Gen:  NAD  Mental Status - Alert. General Appearance - Not in acute distress. HEENT:  PERRL, no carotid bruits or JVD  Chest and Lung Exam   Inspection: Accessory muscles - No use of accessory muscles in breathing. Auscultation:   Breath sounds: - Normal.   Cardiovascular   Inspection: Jugular vein - Bilateral - Inspection Normal.   Palpation/Percussion:   Apical Impulse: - Normal.   Auscultation: Rhythm - Regular. Heart Sounds - S1 WNL and S2 WNL. No S3 or S4. Murmurs & Other Heart Sounds: Auscultation of the heart reveals - No Murmurs. Peripheral Vascular   Upper Extremity: Inspection - Bilateral - No Cyanotic nailbeds or Digital clubbing. Lower Extremity:   Palpation: Edema - Bilateral - No edema. Abdomen:   Soft, non-tender, bowel sounds are active.   Neuro: A&O times 3, CN and motor grossly WNL    Labs:   Lab Results   Component Value Date/Time    Cholesterol, total 174 12/03/2019 11:16 AM    Cholesterol, total 172 02/29/2016 09:32 AM    Cholesterol, total 161 04/11/2014 09:53 AM    Cholesterol, total 160 11/07/2012 08:42 AM    Cholesterol, total 161 09/07/2010 10:21 AM    HDL Cholesterol 68 12/03/2019 11:16 AM    HDL Cholesterol 74 02/29/2016 09:32 AM    HDL Cholesterol 76 04/11/2014 09:53 AM    HDL Cholesterol 71 11/07/2012 08:42 AM    HDL Cholesterol 86 09/07/2010 10:21 AM    LDL, calculated 96 12/03/2019 11:16 AM    LDL, calculated 83 02/29/2016 09:32 AM    LDL, calculated 73 04/11/2014 09:53 AM    LDL, calculated 77 11/07/2012 08:42 AM LDL, calculated 68 09/07/2010 10:21 AM    Triglyceride 51 12/03/2019 11:16 AM    Triglyceride 76 02/29/2016 09:32 AM    Triglyceride 58 04/11/2014 09:53 AM    Triglyceride 62 11/07/2012 08:42 AM    Triglyceride 35 09/07/2010 10:21 AM    CHOL/HDL Ratio 1.9 09/07/2010 10:21 AM    CHOL/HDL Ratio 2.5 04/27/2010 09:11 AM    CHOL/HDL Ratio 2.5 03/15/2010 02:15 AM    CHOL/HDL Ratio 3.6 01/04/2010 09:00 AM    CHOL/HDL Ratio 2.6 09/16/2009 09:05 AM     Lab Results   Component Value Date/Time    CK 69 05/06/2010 06:09 PM     Lab Results   Component Value Date/Time    Sodium 142 12/03/2019 11:16 AM    Potassium 4.6 12/03/2019 11:16 AM    Chloride 102 12/03/2019 11:16 AM    CO2 25 12/03/2019 11:16 AM    Anion gap 8 09/20/2016 04:41 AM    Glucose 107 (H) 12/03/2019 11:16 AM    BUN 13 12/03/2019 11:16 AM    Creatinine 1.09 (H) 12/03/2019 11:16 AM    BUN/Creatinine ratio 12 12/03/2019 11:16 AM    GFR est AA 60 12/03/2019 11:16 AM    GFR est non-AA 52 (L) 12/03/2019 11:16 AM    Calcium 9.4 12/03/2019 11:16 AM    Bilirubin, total 0.4 12/03/2019 11:16 AM    Alk. phosphatase 90 12/03/2019 11:16 AM    Protein, total 6.5 12/03/2019 11:16 AM    Albumin 4.1 12/03/2019 11:16 AM    Globulin 3.5 08/14/2013 08:44 AM    A-G Ratio 1.7 12/03/2019 11:16 AM    ALT (SGPT) 11 12/03/2019 11:16 AM       EKG:  SR     Assessment:     Assessment:      1. Atherosclerosis of native coronary artery of native heart without angina pectoris    2. PAD (peripheral artery disease) ([facility]    3. S/P PTCA (percutaneous transluminal coronary angioplasty)    4. Mixed hyperlipidemia    5. S/P coronary artery stent placement    6. PAF (paroxysmal atrial fibrillation) ([facility]    7. Essential hypertension        Orders Placed This Encounter    AMB POC EKG ROUTINE W/ 12 LEADS, INTER & REP     Order Specific Question:   Reason for Exam:     Answer:   ROUTINE    aspirin 81 mg chewable tablet     Sig: Take 81 mg by mouth daily.         Plan:     Atherosclerosis of native coronary artery of native heart without angina pectoris  Hx of ARCHANA to LCx and RCA . Cath in 12/2019 with 50% LCx stenosis, not significant by FFR (0.95)  Continue ASA, statin therapy  Off plavix since 7/2020  Intolerant to BB therapy. Nitrates & CCB cause orthostatic hypotension. PAF  Short run of a. Fib noted on monitor in 7/2020  Continue Eliquis 5 mg BID   Stable. Essential hypertension  Controlled with current therapy     Mixed hyperlipidemia  ; on simvastatin daily, not taking Zetia  Dr Stuart Isaac did labs this month and results will be sent to us     PAD (peripheral artery disease) ([facility]  Without claudication symptoms. Continue medical management  Previous interventions include:  S/p right SFA atherectomy and DCB PTA 10/2017.    S/p b/l iliac stents 7/2014.    s/p left SFA laser athetectomy and stent 9/2011  s/p left SFA angioplasty for ISR 1/2012.   s/p left SFA redo laser atherectomy and PTA 10/2012  S/p left SFA angioplasty 8/2013    Continue current care and f/u in 6 months. He Grullon NP       Patient seen and examined by me with nurse practitioner. I personally performed all components of the history, physical, and medical decision making and agree with the assessment and plan as noted.     Ted Ayers MD

## 2021-02-01 NOTE — TELEPHONE ENCOUNTER
Pt came in today for appt w/ Dr. Martín Betancur. She has not received her new insurance cards yet. She now has [employer]. Member ID# [id number]. I tried multiple times to add the new ins to her acct. [facility] could not be reached to get the ins info verified. Since she did not have a card I was forced to leave out the new ins info from . I included the number above just in case she does not bring in an ins card before claims need to be processed.     Vani Cunningham

## 2021-02-01 NOTE — PROGRESS NOTES
1. Have you been to the ER, urgent care clinic since your last visit? Hospitalized since your last visit? No    2. Have you seen or consulted any other health care providers outside of the [address] [place] [address] since your last visit? Include any pap smears or colon screening.  No       Chief Complaint   Patient presents with    Coronary Artery Disease     C/O Ankle swelling

## 2021-02-15 RX ORDER — APIXABAN 5 MG/1
TABLET, FILM COATED ORAL
Qty: 60 TAB | Refills: 0 | Status: SHIPPED | OUTPATIENT
Start: 2021-02-15 | End: 2021-03-22

## 2021-03-22 RX ORDER — APIXABAN 5 MG/1
TABLET, FILM COATED ORAL
Qty: 60 TAB | Refills: 0 | Status: SHIPPED | OUTPATIENT
Start: 2021-03-22 | End: 2021-04-25

## 2021-04-14 ENCOUNTER — TELEPHONE (OUTPATIENT)
Dept: CARDIOLOGY CLINIC | Age: 71
End: 2021-04-14

## 2021-04-14 NOTE — TELEPHONE ENCOUNTER
Received fax from [facility] for clearance on Interstim percutaneous test and implant under general anesthesia. No date set. . they are asking for clearance on procedure and stopping ASA and Eliquis before procedure. Pt last seen on 2/1/21 and to f/u in 6 months. Please advise on clearance and stopping medications. Thanks.

## 2021-04-14 NOTE — TELEPHONE ENCOUNTER
Message  Received: Today  Message Contents   Nelson Calhoun MD sent to Joan Garcia LPN   Caller: Unspecified (Today,  8:26 AM)             K to proceed. Can hold aspirin 5 days and eliquis 3 days. Called pt,verified pt with two pt identifiers, advised pt I had received clearance note from [facility] on her. Advised  did clear her. Advised I see where she is has an appt on 5/11/21 with  and if she is having any cardiac symptoms. Pt advised she is coming in for vascular appt on 5/11/21 regarding her legs. She is not having any cardiac symptoms. Advised for pt to keep her appt with jyoti and I will fax over the clearance on pt. Pt verbalized understanding. Faxed message from  above stating ok to proceed, can hold ASA 5 days and Eliquis 3 days. Faxed to [facility] at [phone] and received fax confirmation.

## 2021-04-25 RX ORDER — APIXABAN 5 MG/1
TABLET, FILM COATED ORAL
Qty: 60 TAB | Refills: 0 | Status: SHIPPED | OUTPATIENT
Start: 2021-04-25 | End: 2021-06-07

## 2021-05-08 ENCOUNTER — HOSPITAL ENCOUNTER (EMERGENCY)
Age: 71
Discharge: HOME | End: 2021-05-08
Attending: EMERGENCY MEDICINE
Payer: MEDICARE

## 2021-05-08 ENCOUNTER — APPOINTMENT (OUTPATIENT)
Dept: GENERAL RADIOLOGY | Age: 71
End: 2021-05-08
Attending: EMERGENCY MEDICINE
Payer: MEDICARE

## 2021-05-08 VITALS
HEART RATE: 76 BPM | BODY MASS INDEX: 24.64 KG/M2 | HEIGHT: 67 IN | DIASTOLIC BLOOD PRESSURE: 80 MMHG | OXYGEN SATURATION: 87 % | TEMPERATURE: 98 F | WEIGHT: 157 LBS | RESPIRATION RATE: 16 BRPM | SYSTOLIC BLOOD PRESSURE: 171 MMHG

## 2021-05-08 DIAGNOSIS — E16.2 HYPOGLYCEMIA: Primary | ICD-10-CM

## 2021-05-08 DIAGNOSIS — R11.10 VOMITING, INTRACTABILITY OF VOMITING NOT SPECIFIED, PRESENCE OF NAUSEA NOT SPECIFIED, UNSPECIFIED VOMITING TYPE: ICD-10-CM

## 2021-05-08 LAB
ALBUMIN SERPL-MCNC: 3.4 G/DL (ref 3.5–5)
ALBUMIN/GLOB SERPL: 0.9 {RATIO} (ref 1.1–2.2)
ALP SERPL-CCNC: 110 U/L (ref 45–117)
ALT SERPL-CCNC: 21 U/L (ref 12–78)
ANION GAP SERPL CALC-SCNC: 4 MMOL/L (ref 5–15)
APPEARANCE UR: CLEAR
AST SERPL-CCNC: 25 U/L (ref 15–37)
BASOPHILS # BLD: 0 K/UL (ref 0–0.1)
BASOPHILS NFR BLD: 0 % (ref 0–1)
BILIRUB SERPL-MCNC: 0.5 MG/DL (ref 0.2–1)
BILIRUB UR QL CFM: NEGATIVE
BUN SERPL-MCNC: 17 MG/DL (ref 6–20)
BUN/CREAT SERPL: 15 (ref 12–20)
CALCIUM SERPL-MCNC: 9.1 MG/DL (ref 8.5–10.1)
CHLORIDE SERPL-SCNC: 102 MMOL/L (ref 97–108)
CO2 SERPL-SCNC: 28 MMOL/L (ref 21–32)
COLOR UR: ABNORMAL
CREAT SERPL-MCNC: 1.11 MG/DL (ref 0.55–1.02)
DIFFERENTIAL METHOD BLD: ABNORMAL
EOSINOPHIL # BLD: 0 K/UL (ref 0–0.4)
EOSINOPHIL NFR BLD: 0 % (ref 0–7)
ERYTHROCYTE [DISTWIDTH] IN BLOOD BY AUTOMATED COUNT: 13.3 % (ref 11.5–14.5)
GLOBULIN SER CALC-MCNC: 3.7 G/DL (ref 2–4)
GLUCOSE SERPL-MCNC: 196 MG/DL (ref 65–100)
GLUCOSE UR STRIP.AUTO-MCNC: NEGATIVE MG/DL
HCT VFR BLD AUTO: 39.3 % (ref 35–47)
HGB BLD-MCNC: 12.8 G/DL (ref 11.5–16)
HGB UR QL STRIP: NEGATIVE
IMM GRANULOCYTES # BLD AUTO: 0 K/UL (ref 0–0.04)
IMM GRANULOCYTES NFR BLD AUTO: 0 % (ref 0–0.5)
KETONES UR QL STRIP.AUTO: 15 MG/DL
LACTATE SERPL-SCNC: 1.2 MMOL/L (ref 0.4–2)
LEUKOCYTE ESTERASE UR QL STRIP.AUTO: NEGATIVE
LIPASE SERPL-CCNC: 38 U/L (ref 73–393)
LYMPHOCYTES # BLD: 0.4 K/UL (ref 0.8–3.5)
LYMPHOCYTES NFR BLD: 6 % (ref 12–49)
MCH RBC QN AUTO: 29.4 PG (ref 26–34)
MCHC RBC AUTO-ENTMCNC: 32.6 G/DL (ref 30–36.5)
MCV RBC AUTO: 90.1 FL (ref 80–99)
MONOCYTES # BLD: 0.1 K/UL (ref 0–1)
MONOCYTES NFR BLD: 2 % (ref 5–13)
NEUTS SEG # BLD: 5.5 K/UL (ref 1.8–8)
NEUTS SEG NFR BLD: 92 % (ref 32–75)
NITRITE UR QL STRIP.AUTO: NEGATIVE
NRBC # BLD: 0 K/UL (ref 0–0.01)
NRBC BLD-RTO: 0 PER 100 WBC
PH UR STRIP: 5.5 [PH] (ref 5–8)
PLATELET # BLD AUTO: 150 K/UL (ref 150–400)
PMV BLD AUTO: 10.3 FL (ref 8.9–12.9)
POTASSIUM SERPL-SCNC: 4.7 MMOL/L (ref 3.5–5.1)
PROT SERPL-MCNC: 7.1 G/DL (ref 6.4–8.2)
PROT UR STRIP-MCNC: NEGATIVE MG/DL
RBC # BLD AUTO: 4.36 M/UL (ref 3.8–5.2)
RBC MORPH BLD: ABNORMAL
SODIUM SERPL-SCNC: 134 MMOL/L (ref 136–145)
SP GR UR REFRACTOMETRY: 1.02 (ref 1–1.03)
TROPONIN I SERPL-MCNC: <0.05 NG/ML
UROBILINOGEN UR QL STRIP.AUTO: 1 EU/DL (ref 0.2–1)
WBC # BLD AUTO: 6 K/UL (ref 3.6–11)

## 2021-05-08 PROCEDURE — 84484 ASSAY OF TROPONIN QUANT: CPT

## 2021-05-08 PROCEDURE — 74011250636 HC RX REV CODE- 250/636: Performed by: EMERGENCY MEDICINE

## 2021-05-08 PROCEDURE — 36415 COLL VENOUS BLD VENIPUNCTURE: CPT

## 2021-05-08 PROCEDURE — 81003 URINALYSIS AUTO W/O SCOPE: CPT

## 2021-05-08 PROCEDURE — 96361 HYDRATE IV INFUSION ADD-ON: CPT

## 2021-05-08 PROCEDURE — 87040 BLOOD CULTURE FOR BACTERIA: CPT

## 2021-05-08 PROCEDURE — 71045 X-RAY EXAM CHEST 1 VIEW: CPT

## 2021-05-08 PROCEDURE — 83690 ASSAY OF LIPASE: CPT

## 2021-05-08 PROCEDURE — 85025 COMPLETE CBC W/AUTO DIFF WBC: CPT

## 2021-05-08 PROCEDURE — 99285 EMERGENCY DEPT VISIT HI MDM: CPT

## 2021-05-08 PROCEDURE — 83605 ASSAY OF LACTIC ACID: CPT

## 2021-05-08 PROCEDURE — 80053 COMPREHEN METABOLIC PANEL: CPT

## 2021-05-08 PROCEDURE — 93005 ELECTROCARDIOGRAM TRACING: CPT

## 2021-05-08 PROCEDURE — 96360 HYDRATION IV INFUSION INIT: CPT

## 2021-05-08 RX ADMIN — SODIUM CHLORIDE 1000 ML: 9 INJECTION, SOLUTION INTRAVENOUS at 15:32

## 2021-05-08 NOTE — ED NOTES
1515 pt comes after a bladder surgery with two magnets in her back. Pt is wearing a tandum insulin pump. RN looked

## 2021-05-09 LAB
ATRIAL RATE: 70 BPM
CALCULATED P AXIS, ECG09: 20 DEGREES
CALCULATED R AXIS, ECG10: 9 DEGREES
CALCULATED T AXIS, ECG11: 70 DEGREES
DIAGNOSIS, 93000: NORMAL
P-R INTERVAL, ECG05: 122 MS
Q-T INTERVAL, ECG07: 412 MS
QRS DURATION, ECG06: 94 MS
QTC CALCULATION (BEZET), ECG08: 444 MS
VENTRICULAR RATE, ECG03: 70 BPM

## 2021-05-09 PROCEDURE — 96361 HYDRATE IV INFUSION ADD-ON: CPT

## 2021-05-11 ENCOUNTER — TELEPHONE (OUTPATIENT)
Dept: CARDIOLOGY CLINIC | Age: 71
End: 2021-05-11

## 2021-05-11 ENCOUNTER — OFFICE VISIT (OUTPATIENT)
Dept: CARDIOLOGY CLINIC | Age: 71
End: 2021-05-11
Payer: MEDICARE

## 2021-05-11 VITALS
SYSTOLIC BLOOD PRESSURE: 139 MMHG | OXYGEN SATURATION: 95 % | HEIGHT: 67 IN | RESPIRATION RATE: 16 BRPM | DIASTOLIC BLOOD PRESSURE: 78 MMHG | TEMPERATURE: 98.2 F | WEIGHT: 158 LBS | HEART RATE: 95 BPM | BODY MASS INDEX: 24.8 KG/M2

## 2021-05-11 DIAGNOSIS — I73.9 PAD (PERIPHERAL ARTERY DISEASE) (HCC): Primary | ICD-10-CM

## 2021-05-11 DIAGNOSIS — R06.02 SOB (SHORTNESS OF BREATH): ICD-10-CM

## 2021-05-11 DIAGNOSIS — R06.09 DOE (DYSPNEA ON EXERTION): ICD-10-CM

## 2021-05-11 DIAGNOSIS — E78.2 MIXED HYPERLIPIDEMIA: ICD-10-CM

## 2021-05-11 DIAGNOSIS — I10 ESSENTIAL HYPERTENSION: ICD-10-CM

## 2021-05-11 DIAGNOSIS — Z95.5 S/P CORONARY ARTERY STENT PLACEMENT: ICD-10-CM

## 2021-05-11 PROCEDURE — 3288F FALL RISK ASSESSMENT DOCD: CPT | Performed by: INTERNAL MEDICINE

## 2021-05-11 PROCEDURE — 3017F COLORECTAL CA SCREEN DOC REV: CPT | Performed by: INTERNAL MEDICINE

## 2021-05-11 PROCEDURE — G8427 DOCREV CUR MEDS BY ELIG CLIN: HCPCS | Performed by: INTERNAL MEDICINE

## 2021-05-11 PROCEDURE — G8754 DIAS BP LESS 90: HCPCS | Performed by: INTERNAL MEDICINE

## 2021-05-11 PROCEDURE — 1100F PTFALLS ASSESS-DOCD GE2>/YR: CPT | Performed by: INTERNAL MEDICINE

## 2021-05-11 PROCEDURE — G8420 CALC BMI NORM PARAMETERS: HCPCS | Performed by: INTERNAL MEDICINE

## 2021-05-11 PROCEDURE — G8399 PT W/DXA RESULTS DOCUMENT: HCPCS | Performed by: INTERNAL MEDICINE

## 2021-05-11 PROCEDURE — 99214 OFFICE O/P EST MOD 30 MIN: CPT | Performed by: INTERNAL MEDICINE

## 2021-05-11 PROCEDURE — G8536 NO DOC ELDER MAL SCRN: HCPCS | Performed by: INTERNAL MEDICINE

## 2021-05-11 PROCEDURE — 1090F PRES/ABSN URINE INCON ASSESS: CPT | Performed by: INTERNAL MEDICINE

## 2021-05-11 PROCEDURE — G8752 SYS BP LESS 140: HCPCS | Performed by: INTERNAL MEDICINE

## 2021-05-11 PROCEDURE — G8510 SCR DEP NEG, NO PLAN REQD: HCPCS | Performed by: INTERNAL MEDICINE

## 2021-05-11 NOTE — PROGRESS NOTES
Manuel Obregon is a 72 y.o. female  Chief Complaint   Patient presents with    Follow-up     1. Have you been to the ER, urgent care clinic since your last visit? Hospitalized since your last visit? yes    2. Have you seen or consulted any other health care providers outside of the [address] [place] [address] since your last visit? Include any pap smears or colon screening.  No  Health Maintenance   Topic Date Due    Hepatitis C Screening  Never done    Foot Exam Q1  Never done    Eye Exam Retinal or Dilated  Never done    COVID-19 Vaccine (1) Never done    DTaP/Tdap/Td series (1 - Tdap) Never done    Shingrix Vaccine Age 50> (1 of 2) Never done    Colorectal Cancer Screening Combo  Never done    MICROALBUMIN Q1  06/02/2010    Pneumococcal 65+ years (1 of 1 - PPSV23) Never done    A1C test (Diabetic or Prediabetic)  02/28/2017    Medicare Yearly Exam  Never done    Breast Cancer Screen Mammogram  02/26/2021    Lipid Screen  07/31/2021    Flu Vaccine (Season Ended) 09/01/2021    Bone Densitometry (Dexa) Screening  Completed     Visit Vitals  /78 (BP 1 Location: Left upper arm, BP Patient Position: At rest, BP Cuff Size: Small adult)   Pulse 95   Temp 98.2 °F (36.8 °C) (Skin)   Resp 16   Ht 5' 7\" (1.702 m)   Wt 158 lb (71.7 kg)   SpO2 95%   BMI 24.75 kg/m²

## 2021-05-11 NOTE — PROGRESS NOTES
5/11/2021 10:42 AM      Subjective:     Praveen Mcfarland is seen in office today for f/u visit. C/o SAHNI for last few wks and left LE pain and claudication and discomfort in left hip. She denies chest pain, chest pressure/discomfort, palpitations, irregular heart beats, near-syncope, syncope, fatigue, lower extremity edema. Visit Vitals  /78 (BP 1 Location: Left upper arm, BP Patient Position: At rest, BP Cuff Size: Small adult)   Pulse 95   Temp 98.2 °F (36.8 °C) (Skin)   Resp 16   Ht 5' 7\" (1.702 m)   Wt 158 lb (71.7 kg)   SpO2 95%   BMI 24.75 kg/m²     Current Outpatient Medications   Medication Sig    Eliquis 5 mg tablet Take 1 tablet by mouth twice daily    aspirin 81 mg chewable tablet Take 81 mg by mouth daily.  ezetimibe (Zetia) 10 mg tablet Take 1 Tab by mouth daily.  trospium (SANCTURA) 20 mg tablet Take 60 mg by mouth two (2) times a day.  alendronate (FOSAMAX) 10 mg tablet Take 10 mg by mouth daily.  simvastatin (ZOCOR) 40 mg tablet Take 40 mg by mouth daily.  gabapentin (NEURONTIN) 300 mg capsule Take 300 mg by mouth three (3) times daily.  insulin lispro (HUMALOG U-100 INSULIN) 100 unit/mL injection by SubCUTAneous route. INSULIN PUMP    nitroglycerin (NITROSTAT) 0.4 mg SL tablet 1 Tab by SubLINGual route every five (5) minutes as needed for Chest Pain.  calcium 500 mg tab Take 1 Tab by mouth daily.  cholecalciferol, vitamin D3, (VITAMIN D3) 4,000 unit cap Take  by mouth daily.  levothyroxine (SYNTHROID) 50 mcg tablet Take 50 mcg by mouth daily (before breakfast).  CINNAMON BARK (CINNAMON PO) Take 2,000 mg by mouth daily. No current facility-administered medications for this visit.           Objective:      Visit Vitals  /78 (BP 1 Location: Left upper arm, BP Patient Position: At rest, BP Cuff Size: Small adult)   Pulse 95   Temp 98.2 °F (36.8 °C) (Skin)   Resp 16   Ht 5' 7\" (1.702 m)   Wt 158 lb (71.7 kg)   SpO2 95%   BMI 24.75 kg/m²       Past Medical History:   Diagnosis Date    Arthritis     BILAT HANDS    CAD (coronary artery disease)     high cholesterol; heart murmur    Cancer (HCC)     uterine    Chronic kidney disease     Diabetes ([address]     Endocrine disease     hypothyroidism    Hypertension     Neurological disorder     TIA    Other ill-defined conditions(799.89)     PVD; benign right breast tumor removed    Peripheral vascular angioplasty status 8/14/2013 8/14/13 s/p angioplasty L RFA    Stroke ([address]     TIA  X3 NO RESIDUAL    Thyroid disease     HYPOTHYROID      Past Surgical History:   Procedure Laterality Date    HX ANKLE FRACTURE TX      pins and plates right ankle    HX BREAST BIOPSY Right     negative  1998    HX GYN      hysterectomy    HX OTHER SURGICAL      fem pop bypass on left    SD BREAST SURGERY PROCEDURE UNLISTED      tumor removed    SD CARDIAC SURG PROCEDURE UNLIST      CARDIAC STENT  X2     Allergies   Allergen Reactions    Pcn [Penicillins] Anaphylaxis    Codeine Palpitations    Ivp Dye [Fd And C Blue No.1] Nausea and Vomiting     \"I was told in [facility] that it would stop my kidney up if I got it again. \"      Family History   Problem Relation Age of Onset   Yara Lantigua Cancer Mother     Hypertension Sister     Hypertension Sister     Heart Disease Brother 72    Stroke Sister         x2 sisters    Diabetes Father     Cancer Father       Social History     Socioeconomic History    Marital status:      Spouse name: Cirilo Wilson    Number of children: 11    Years of education: Not on file    Highest education level: Not on file   Occupational History     Employer: NOT EMPLOYED   Social Needs    Financial resource strain: Somewhat hard    Food insecurity     Worry: Never true     Inability: Never true    Transportation needs     Medical: Not on file     Non-medical: Not on file   Tobacco Use    Smoking status: Former Smoker     Packs/day: 5.00     Years: 30.00     Pack years: 150.00     Quit date: 2000     Years since quittin.7    Smokeless tobacco: Never Used   Substance and Sexual Activity    Alcohol use: No     Alcohol/week: 0.0 standard drinks    Drug use: No    Sexual activity: Not on file   Lifestyle    Physical activity     Days per week: Not on file     Minutes per session: Not on file    Stress: Not on file   Relationships    Social connections     Talks on phone: Not on file     Gets together: Not on file     Attends Christianity service: Not on file     Active member of club or organization: Not on file     Attends meetings of clubs or organizations: Not on file     Relationship status: Not on file    Intimate partner violence     Fear of current or ex partner: Not on file     Emotionally abused: Not on file     Physically abused: Not on file     Forced sexual activity: Not on file   Other Topics Concern     Service Not Asked    Blood Transfusions Not Asked    Caffeine Concern Not Asked    Occupational Exposure Not Asked   Brijesh Fent Hazards Not Asked    Sleep Concern Not Asked    Stress Concern Not Asked    Weight Concern Not Asked    Special Diet Not Asked    Back Care Not Asked    Exercise Not Asked    Bike Helmet Not Asked    Seat Belt Not Asked    Self-Exams Not Asked   Social History Narrative    Not on file         Review of Systems     General: Not Present- Anorexia, Chills, Dietary Changes, Fatigue, Fever, Medication Changes, Night Sweats, Weight Gain > 10lbs. and Weight Loss > 10lbs. .  Skin: Not Present- Bruising and Excessive Sweating. HEENT: Not Present- Headache, Visual Loss and Vertigo. Respiratory: Not Present- Cough, Snoring and Wheezing. Cardiovascular: Not Present- Abnormal Blood Pressure, Chest Pain, Edema, Fainting / Blacking Out, Irregular Heart Beat, Orthopnea, Palpitations, Paroxysmal Nocturnal Dyspnea, Rapid Heart Rate, and Swelling of Extremities.   Gastrointestinal: Not Present- Black, Tarry Stool, Bloody Stool, Diarrhea, Hematemesis, Rectal Bleeding and Vomiting. Musculoskeletal: Not Present- Muscle Pain and Muscle Weakness. Neurological: Not Present- Dizziness. Psychiatric: Not Present- Depression. Endocrine: Not Present- Cold Intolerance, Heat Intolerance and Thyroid Problems. Hematology: Not Present- Abnormal Bleeding, Anemia, Blood Clots and Easy Bruising. Physical Exam   The physical exam findings are as follows:       General   Mental Status - Alert. General Appearance - Not in acute distress. Chest and Lung Exam   Inspection: Accessory muscles - No use of accessory muscles in breathing. Auscultation:   Breath sounds: - Normal.      Cardiovascular   Inspection: Jugular vein - Bilateral - Inspection Normal.  Palpation/Percussion:   Apical Impulse: - Normal.  Auscultation: Rhythm - Regular. Heart Sounds - S1 WNL and S2 WNL. No S3 or S4. Murmurs & Other Heart Sounds: Auscultation of the heart reveals - No Murmurs. Carotid arteries - No Carotid bruit. Peripheral Vascular   Upper Extremity: Inspection - Bilateral - No Cyanotic nailbeds or Digital clubbing. Lower Extremity:   Palpation: Dorsalis pedis pulse - Bilateral - weak. Posterior tibia pulse - Bilateral - weak. Edema - Bilateral - No edema. Assessment:       ICD-10-CM ICD-9-CM    1. PAD (peripheral artery disease) ([facility])  I73.9 443. 9 ANKLE BRACHIAL INDEX      NUCLEAR CARDIAC STRESS TEST   2. Mixed hyperlipidemia  E78.2 272.2 ANKLE BRACHIAL INDEX      NUCLEAR CARDIAC STRESS TEST   3. SOB (shortness of breath)  R06.02 786.05 ANKLE BRACHIAL INDEX      NUCLEAR CARDIAC STRESS TEST   4. S/P coronary artery stent placement  Z95.5 V45.82 ANKLE BRACHIAL INDEX      NUCLEAR CARDIAC STRESS TEST   5. Essential hypertension  I10 401.9 ANKLE BRACHIAL INDEX      NUCLEAR CARDIAC STRESS TEST   6.  SAHNI (dyspnea on exertion)  R06.00 786.09 ANKLE BRACHIAL INDEX      NUCLEAR CARDIAC STRESS TEST       Plan:     Atherosclerosis of native coronary artery of native heart without angina pectoris  Hx of ARCHANA to LCx and RCA . Cath in 12/2019 with 50% LCx stenosis, not significant by FFR (0.95)  Intolerant to BB therapy. Nitrates & CCB cause orthostatic hypotension. Now c/o SOB. Check leximyoview.      PAF  Short run of a. Fib noted on monitor in 7/2020  Continue Eliquis 5 mg BID   Stable.     Essential hypertension  Controlled with current therapy     Mixed hyperlipidemia  On statin. FLP done by endo.      PAD (peripheral artery disease) ([address]  Now c/o claudication symptoms in left leg and left hip pain. Check VENECIA. If normal then consider orthos evaluation.    Previous interventions include:  S/p right SFA atherectomy and DCB PTA 10/2017.    S/p b/l iliac stents 7/2014.    s/p left SFA laser athetectomy and stent 9/2011  s/p left SFA angioplasty for ISR 1/2012.   s/p left SFA redo laser atherectomy and PTA 10/2012  S/p left SFA angioplasty 8/2013

## 2021-05-14 LAB
BACTERIA SPEC CULT: NORMAL
SERVICE CMNT-IMP: NORMAL

## 2021-05-25 ENCOUNTER — ANCILLARY PROCEDURE (OUTPATIENT)
Dept: CARDIOLOGY CLINIC | Age: 71
End: 2021-05-25
Payer: MEDICARE

## 2021-05-25 VITALS — BODY MASS INDEX: 24.33 KG/M2 | HEIGHT: 67 IN | WEIGHT: 155 LBS

## 2021-05-25 DIAGNOSIS — I73.9 PAD (PERIPHERAL ARTERY DISEASE) (HCC): ICD-10-CM

## 2021-05-25 DIAGNOSIS — R06.09 DOE (DYSPNEA ON EXERTION): ICD-10-CM

## 2021-05-25 DIAGNOSIS — Z95.5 S/P CORONARY ARTERY STENT PLACEMENT: ICD-10-CM

## 2021-05-25 DIAGNOSIS — R06.02 SOB (SHORTNESS OF BREATH): ICD-10-CM

## 2021-05-25 DIAGNOSIS — I10 ESSENTIAL HYPERTENSION: ICD-10-CM

## 2021-05-25 DIAGNOSIS — E78.2 MIXED HYPERLIPIDEMIA: ICD-10-CM

## 2021-05-25 PROCEDURE — 93015 CV STRESS TEST SUPVJ I&R: CPT | Performed by: INTERNAL MEDICINE

## 2021-05-25 PROCEDURE — A9502 TC99M TETROFOSMIN: HCPCS | Performed by: INTERNAL MEDICINE

## 2021-05-25 PROCEDURE — 78452 HT MUSCLE IMAGE SPECT MULT: CPT | Performed by: INTERNAL MEDICINE

## 2021-05-27 ENCOUNTER — ANCILLARY PROCEDURE (OUTPATIENT)
Dept: CARDIOLOGY CLINIC | Age: 71
End: 2021-05-27
Payer: MEDICARE

## 2021-05-27 ENCOUNTER — APPOINTMENT (OUTPATIENT)
Dept: CARDIOLOGY CLINIC | Age: 71
End: 2021-05-27

## 2021-05-27 VITALS — WEIGHT: 158 LBS | HEIGHT: 67 IN | BODY MASS INDEX: 24.8 KG/M2

## 2021-05-27 LAB
IMMEDIATE ARM BP: 193 MMHG
IMMEDIATE LEFT ABI: 0.61
IMMEDIATE LEFT TIBIAL: 118 MMHG
IMMEDIATE RIGHT ABI: 0.67
IMMEDIATE RIGHT TIBIAL: 130 MMHG
LEFT ABI: 0.5
LEFT ARM BP: 189 MMHG
LEFT POSTERIOR TIBIAL: 95 MMHG
LEFT TBI: 0.32
LEFT TOE PRESSURE: 61 MMHG
RIGHT ABI: 0.68
RIGHT ARM BP: 191 MMHG
RIGHT POSTERIOR TIBIAL: 130 MMHG
RIGHT TBI: 0.39
RIGHT TOE PRESSURE: 74 MMHG
STRESS BASELINE DIAS BP: 70 MMHG
STRESS BASELINE HR: 66 BPM
STRESS BASELINE SYS BP: 158 MMHG
STRESS PEAK DIAS BP: 70 MMHG
STRESS PEAK SYS BP: 158 MMHG
STRESS PERCENT HR ACHIEVED: 60 %
STRESS POST PEAK HR: 90 BPM
STRESS RATE PRESSURE PRODUCT: NORMAL BPM*MMHG
STRESS ST DEPRESSION: 0 MM
STRESS ST ELEVATION: 0 MM
STRESS TARGET HR: 151 BPM

## 2021-05-27 PROCEDURE — 93923 UPR/LXTR ART STDY 3+ LVLS: CPT | Performed by: INTERNAL MEDICINE

## 2021-05-28 ENCOUNTER — TELEPHONE (OUTPATIENT)
Dept: CARDIOLOGY CLINIC | Age: 71
End: 2021-05-28

## 2021-05-28 NOTE — TELEPHONE ENCOUNTER
Message  Received: Today  Artelia Indian sent to Tiesha Leyva LPN; Sandy Casper MD  Caller: Unspecified (Today,  9:34 AM)  Good to go I added the Cath with the LE on for 6/4         Noted, thanks.

## 2021-05-28 NOTE — TELEPHONE ENCOUNTER
----- Message from Shen Sepulveda MD sent at 5/27/2021  6:49 PM EDT -----  Inform her that stress test is abnormal. Let her know I will perform cardiac cath at time of LE angio. If she has any question let me know. JJ add cardiac cath along with her LE angio  ----- Message -----  From: Marita Vasquez MD  Sent: 5/27/2021   6:07 PM EDT  To: Shen Sepulveda MD          Called pt,verified pt with two pt identifiers, advised pt  reviewed her stress test and it is abn and she will need a cardiac cath. Advised he can do this the same day as her upcoming le angio. Answered any questions pt had regarding stress test and cardiac cath. Verona Heaton will call with any additional notes on scheduling cardiac cath. Pt verbalized understanding.

## 2021-05-28 NOTE — PERIOP NOTES
Patient denied any COVID-19 symptoms or possible exposure that would require a pre-procedural COVID-19 test for the Cath Lab procedure scheduled on 6/4.

## 2021-06-04 ENCOUNTER — HOSPITAL ENCOUNTER (OUTPATIENT)
Age: 71
Discharge: HOME | End: 2021-06-05
Attending: INTERNAL MEDICINE | Admitting: NURSE PRACTITIONER
Payer: MEDICARE

## 2021-06-04 DIAGNOSIS — R94.39 ABNORMAL CARDIOVASCULAR STRESS TEST: ICD-10-CM

## 2021-06-04 DIAGNOSIS — I20.9 ANGINA PECTORIS (HCC): ICD-10-CM

## 2021-06-04 DIAGNOSIS — I73.9 PVD (PERIPHERAL VASCULAR DISEASE) (HCC): ICD-10-CM

## 2021-06-04 LAB
GLUCOSE BLD STRIP.AUTO-MCNC: 120 MG/DL (ref 65–117)
GLUCOSE BLD STRIP.AUTO-MCNC: 140 MG/DL (ref 65–117)
GLUCOSE BLD STRIP.AUTO-MCNC: 93 MG/DL (ref 65–117)
SERVICE CMNT-IMP: ABNORMAL
SERVICE CMNT-IMP: ABNORMAL
SERVICE CMNT-IMP: NORMAL

## 2021-06-04 PROCEDURE — 99153 MOD SED SAME PHYS/QHP EA: CPT | Performed by: INTERNAL MEDICINE

## 2021-06-04 PROCEDURE — 99152 MOD SED SAME PHYS/QHP 5/>YRS: CPT | Performed by: INTERNAL MEDICINE

## 2021-06-04 PROCEDURE — 74011000258 HC RX REV CODE- 258: Performed by: INTERNAL MEDICINE

## 2021-06-04 PROCEDURE — C1769 GUIDE WIRE: HCPCS | Performed by: INTERNAL MEDICINE

## 2021-06-04 PROCEDURE — 77030021533 HC CATH ANGI DX PRFMA MRTM -A: Performed by: INTERNAL MEDICINE

## 2021-06-04 PROCEDURE — 36200 PLACE CATHETER IN AORTA: CPT | Performed by: INTERNAL MEDICINE

## 2021-06-04 PROCEDURE — 77030004549 HC CATH ANGI DX PRF MRTM -A: Performed by: INTERNAL MEDICINE

## 2021-06-04 PROCEDURE — 75716 ARTERY X-RAYS ARMS/LEGS: CPT | Performed by: INTERNAL MEDICINE

## 2021-06-04 PROCEDURE — 74011250637 HC RX REV CODE- 250/637: Performed by: INTERNAL MEDICINE

## 2021-06-04 PROCEDURE — 77030010221 HC SPLNT WR POS TELE -B: Performed by: INTERNAL MEDICINE

## 2021-06-04 PROCEDURE — C1725 CATH, TRANSLUMIN NON-LASER: HCPCS | Performed by: INTERNAL MEDICINE

## 2021-06-04 PROCEDURE — 82962 GLUCOSE BLOOD TEST: CPT

## 2021-06-04 PROCEDURE — 77030019698 HC SYR ANGI MDLON MRTM -A: Performed by: INTERNAL MEDICINE

## 2021-06-04 PROCEDURE — 77030019697 HC SYR ANGI INFL MRTM -B: Performed by: INTERNAL MEDICINE

## 2021-06-04 PROCEDURE — 93458 L HRT ARTERY/VENTRICLE ANGIO: CPT | Performed by: INTERNAL MEDICINE

## 2021-06-04 PROCEDURE — 75625 CONTRAST EXAM ABDOMINL AORTA: CPT | Performed by: INTERNAL MEDICINE

## 2021-06-04 PROCEDURE — 74011000636 HC RX REV CODE- 636: Performed by: INTERNAL MEDICINE

## 2021-06-04 PROCEDURE — 92928 PRQ TCAT PLMT NTRAC ST 1 LES: CPT | Performed by: INTERNAL MEDICINE

## 2021-06-04 PROCEDURE — C1887 CATHETER, GUIDING: HCPCS | Performed by: INTERNAL MEDICINE

## 2021-06-04 PROCEDURE — C1876 STENT, NON-COA/NON-COV W/DEL: HCPCS | Performed by: INTERNAL MEDICINE

## 2021-06-04 PROCEDURE — 77030008543 HC TBNG MON PRSS MRTM -A: Performed by: INTERNAL MEDICINE

## 2021-06-04 PROCEDURE — 76937 US GUIDE VASCULAR ACCESS: CPT | Performed by: INTERNAL MEDICINE

## 2021-06-04 PROCEDURE — 74011250636 HC RX REV CODE- 250/636: Performed by: INTERNAL MEDICINE

## 2021-06-04 PROCEDURE — C1894 INTRO/SHEATH, NON-LASER: HCPCS | Performed by: INTERNAL MEDICINE

## 2021-06-04 PROCEDURE — 77030028837 HC SYR ANGI PWR INJ COEU -A: Performed by: INTERNAL MEDICINE

## 2021-06-04 PROCEDURE — C1874 STENT, COATED/COV W/DEL SYS: HCPCS | Performed by: INTERNAL MEDICINE

## 2021-06-04 PROCEDURE — 77030019569 HC BND COMPR RAD TERU -B: Performed by: INTERNAL MEDICINE

## 2021-06-04 PROCEDURE — 74011250636 HC RX REV CODE- 250/636: Performed by: NURSE PRACTITIONER

## 2021-06-04 PROCEDURE — 74011000250 HC RX REV CODE- 250: Performed by: INTERNAL MEDICINE

## 2021-06-04 PROCEDURE — 77030015766: Performed by: INTERNAL MEDICINE

## 2021-06-04 PROCEDURE — 74011250637 HC RX REV CODE- 250/637: Performed by: NURSE PRACTITIONER

## 2021-06-04 DEVICE — STENT RONYX20008UX RESOLUTE ONYX 2.00X08
Type: IMPLANTABLE DEVICE | Status: FUNCTIONAL
Brand: RESOLUTE ONYX™

## 2021-06-04 RX ORDER — DIPHENHYDRAMINE HYDROCHLORIDE 50 MG/ML
25-50 INJECTION, SOLUTION INTRAMUSCULAR; INTRAVENOUS ONCE
Status: COMPLETED | OUTPATIENT
Start: 2021-06-04 | End: 2021-06-04

## 2021-06-04 RX ORDER — INSULIN LISPRO 100 [IU]/ML
INJECTION, SOLUTION INTRAVENOUS; SUBCUTANEOUS
Status: DISCONTINUED | OUTPATIENT
Start: 2021-06-04 | End: 2021-06-05 | Stop reason: HOSPADM

## 2021-06-04 RX ORDER — GABAPENTIN 300 MG/1
300 CAPSULE ORAL 3 TIMES DAILY
Status: DISCONTINUED | OUTPATIENT
Start: 2021-06-04 | End: 2021-06-05 | Stop reason: HOSPADM

## 2021-06-04 RX ORDER — MIDAZOLAM HYDROCHLORIDE 1 MG/ML
INJECTION, SOLUTION INTRAMUSCULAR; INTRAVENOUS AS NEEDED
Status: DISCONTINUED | OUTPATIENT
Start: 2021-06-04 | End: 2021-06-04 | Stop reason: HOSPADM

## 2021-06-04 RX ORDER — FENTANYL CITRATE 50 UG/ML
25 INJECTION, SOLUTION INTRAMUSCULAR; INTRAVENOUS ONCE
Status: COMPLETED | OUTPATIENT
Start: 2021-06-04 | End: 2021-06-04

## 2021-06-04 RX ORDER — GUAIFENESIN 100 MG/5ML
81 LIQUID (ML) ORAL ONCE
Status: COMPLETED | OUTPATIENT
Start: 2021-06-04 | End: 2021-06-04

## 2021-06-04 RX ORDER — GUAIFENESIN 100 MG/5ML
81 LIQUID (ML) ORAL DAILY
Status: DISCONTINUED | OUTPATIENT
Start: 2021-06-05 | End: 2021-06-05 | Stop reason: HOSPADM

## 2021-06-04 RX ORDER — TROSPIUM CHLORIDE 20 MG/1
60 TABLET, FILM COATED ORAL 2 TIMES DAILY
Status: DISCONTINUED | OUTPATIENT
Start: 2021-06-04 | End: 2021-06-04 | Stop reason: SDUPTHER

## 2021-06-04 RX ORDER — SODIUM CHLORIDE 0.9 % (FLUSH) 0.9 %
5-40 SYRINGE (ML) INJECTION EVERY 8 HOURS
Status: DISCONTINUED | OUTPATIENT
Start: 2021-06-04 | End: 2021-06-05 | Stop reason: HOSPADM

## 2021-06-04 RX ORDER — HEPARIN SODIUM 200 [USP'U]/100ML
INJECTION, SOLUTION INTRAVENOUS
Status: COMPLETED | OUTPATIENT
Start: 2021-06-04 | End: 2021-06-04

## 2021-06-04 RX ORDER — SODIUM CHLORIDE 0.9 % (FLUSH) 0.9 %
5-40 SYRINGE (ML) INJECTION AS NEEDED
Status: DISCONTINUED | OUTPATIENT
Start: 2021-06-04 | End: 2021-06-05 | Stop reason: HOSPADM

## 2021-06-04 RX ORDER — ACETAMINOPHEN 325 MG/1
650 TABLET ORAL
Status: DISCONTINUED | OUTPATIENT
Start: 2021-06-04 | End: 2021-06-05 | Stop reason: HOSPADM

## 2021-06-04 RX ORDER — EZETIMIBE 10 MG/1
10 TABLET ORAL DAILY
Status: DISCONTINUED | OUTPATIENT
Start: 2021-06-05 | End: 2021-06-05 | Stop reason: HOSPADM

## 2021-06-04 RX ORDER — GUAIFENESIN 100 MG/5ML
81 LIQUID (ML) ORAL DAILY
Qty: 30 TABLET | Refills: 0 | Status: SHIPPED | OUTPATIENT
Start: 2021-06-04 | End: 2021-07-09

## 2021-06-04 RX ORDER — NALOXONE HYDROCHLORIDE 0.4 MG/ML
0.4 INJECTION, SOLUTION INTRAMUSCULAR; INTRAVENOUS; SUBCUTANEOUS AS NEEDED
Status: DISCONTINUED | OUTPATIENT
Start: 2021-06-04 | End: 2021-06-05 | Stop reason: HOSPADM

## 2021-06-04 RX ORDER — HYDROCORTISONE SODIUM SUCCINATE 100 MG/2ML
100 INJECTION, POWDER, FOR SOLUTION INTRAMUSCULAR; INTRAVENOUS ONCE
Status: COMPLETED | OUTPATIENT
Start: 2021-06-04 | End: 2021-06-04

## 2021-06-04 RX ORDER — LIDOCAINE HYDROCHLORIDE 10 MG/ML
INJECTION, SOLUTION EPIDURAL; INFILTRATION; INTRACAUDAL; PERINEURAL AS NEEDED
Status: DISCONTINUED | OUTPATIENT
Start: 2021-06-04 | End: 2021-06-04 | Stop reason: HOSPADM

## 2021-06-04 RX ORDER — DEXTROSE 50 % IN WATER (D50W) INTRAVENOUS SYRINGE
12.5-25 AS NEEDED
Status: DISCONTINUED | OUTPATIENT
Start: 2021-06-04 | End: 2021-06-05 | Stop reason: HOSPADM

## 2021-06-04 RX ORDER — LEVOTHYROXINE SODIUM 50 UG/1
50 TABLET ORAL
Status: DISCONTINUED | OUTPATIENT
Start: 2021-06-05 | End: 2021-06-05 | Stop reason: HOSPADM

## 2021-06-04 RX ORDER — ATORVASTATIN CALCIUM 20 MG/1
20 TABLET, FILM COATED ORAL
Status: DISCONTINUED | OUTPATIENT
Start: 2021-06-04 | End: 2021-06-05 | Stop reason: HOSPADM

## 2021-06-04 RX ORDER — HYDRALAZINE HYDROCHLORIDE 20 MG/ML
INJECTION INTRAMUSCULAR; INTRAVENOUS AS NEEDED
Status: DISCONTINUED | OUTPATIENT
Start: 2021-06-04 | End: 2021-06-04 | Stop reason: HOSPADM

## 2021-06-04 RX ORDER — SODIUM CHLORIDE 9 MG/ML
1000 INJECTION, SOLUTION INTRAVENOUS CONTINUOUS
Status: DISCONTINUED | OUTPATIENT
Start: 2021-06-04 | End: 2021-06-05 | Stop reason: HOSPADM

## 2021-06-04 RX ORDER — FENTANYL CITRATE 50 UG/ML
INJECTION, SOLUTION INTRAMUSCULAR; INTRAVENOUS AS NEEDED
Status: DISCONTINUED | OUTPATIENT
Start: 2021-06-04 | End: 2021-06-04 | Stop reason: HOSPADM

## 2021-06-04 RX ORDER — OXYBUTYNIN CHLORIDE 5 MG/1
20 TABLET, EXTENDED RELEASE ORAL DAILY
Status: DISCONTINUED | OUTPATIENT
Start: 2021-06-05 | End: 2021-06-05 | Stop reason: HOSPADM

## 2021-06-04 RX ORDER — MAGNESIUM SULFATE 100 %
4 CRYSTALS MISCELLANEOUS AS NEEDED
Status: DISCONTINUED | OUTPATIENT
Start: 2021-06-04 | End: 2021-06-05 | Stop reason: HOSPADM

## 2021-06-04 RX ORDER — IODIXANOL 320 MG/ML
INJECTION, SOLUTION INTRAVASCULAR AS NEEDED
Status: DISCONTINUED | OUTPATIENT
Start: 2021-06-04 | End: 2021-06-04 | Stop reason: HOSPADM

## 2021-06-04 RX ADMIN — Medication 10 ML: at 22:25

## 2021-06-04 RX ADMIN — GABAPENTIN 300 MG: 300 CAPSULE ORAL at 22:25

## 2021-06-04 RX ADMIN — DIPHENHYDRAMINE HYDROCHLORIDE 50 MG: 50 INJECTION, SOLUTION INTRAMUSCULAR; INTRAVENOUS at 09:33

## 2021-06-04 RX ADMIN — TICAGRELOR 90 MG: 90 TABLET ORAL at 22:25

## 2021-06-04 RX ADMIN — HYDROCORTISONE SODIUM SUCCINATE 100 MG: 100 INJECTION, POWDER, FOR SOLUTION INTRAMUSCULAR; INTRAVENOUS at 09:33

## 2021-06-04 RX ADMIN — FENTANYL CITRATE 25 MCG: 50 INJECTION, SOLUTION INTRAMUSCULAR; INTRAVENOUS at 14:27

## 2021-06-04 RX ADMIN — ASPIRIN 81 MG CHEWABLE TABLET 81 MG: 81 TABLET CHEWABLE at 08:57

## 2021-06-04 RX ADMIN — Medication 10 ML: at 17:02

## 2021-06-04 RX ADMIN — ATORVASTATIN CALCIUM 20 MG: 20 TABLET, FILM COATED ORAL at 22:25

## 2021-06-04 RX ADMIN — GABAPENTIN 300 MG: 300 CAPSULE ORAL at 17:02

## 2021-06-04 NOTE — Clinical Note
TRANSFER - OUT REPORT:     Verbal report given to: Norma Santiago. Report consisted of patient's Situation, Background, Assessment and   Recommendations(SBAR). Opportunity for questions and clarification was provided. Patient transported with a Registered Nurse. [facility]

## 2021-06-04 NOTE — Clinical Note
Stent inserted. Stent deployed. Multiple inflations used. First inflation pressure = 12 gabriela; inflation time: 10 sec. Second inflation pressure: 12 gabriela; inflation time: 10 sec.

## 2021-06-04 NOTE — DISCHARGE INSTRUCTIONS
[address]  [phone]        Patient ID:  Mar Moreno  [id number]  67 y.o.  1950    Admit Date: 6/4/2021    Discharge Date: 6/4/2021     Admitting Physician: Lotus Huerta MD     Discharge Physician: Lotus Huerta MD    Admission Diagnoses:   PVD (peripheral vascular disease) [facility]) [I73.9]    Discharge Diagnoses: Active Problems:    * No active hospital problems. *      Discharge Condition: Good    Cardiology Procedures this Admission:  Left heart catheterization with PCI    Disposition: home    Reference discharge instructions provided by nursing for diet and activity. Signed:  Tan Ricardo NP  6/4/2021  1:17 PM        Radial Cardiac Catheterization/Angiography Discharge Instructions    It is normal to feel tired the first couple days. Take it easy and follow the physicians instructions. CHECK THE CATHETER INSERTION SITE DAILY:    Remove the wrist dressing 24 hours after the procedure. You may shower 24 hours after the procedure. Wash with soap and water and pat dry. Gentle cleaning of the site with soap and water is sufficient, cover with a dry clean dressing or bandage. Do not apply creams or powders to the area. No soaking the wrist for 3 days. Leave the puncture site open to air after 24 hours post-procedure. CALL THE PHYSICIANS:     If the site becomes red, swollen or feels warm to the touch  If there is bleeding or drainage or if there is unusual pain at the radial site. If there is any minor oozing, you may apply a band-aid and remove after 12 hours. If the bleeding continues, hold pressure with the middle finger against the puncture site and the thumb against the back of the wrist,call 911 to be transported to the hospital.  DO NOT DRIVE YOURSELF, [facility]     ACTIVITY:   For the first 24 hours do not manipulate the wrist.  No lifting, pushing or pulling over 3-5 pounds with the affected wrist for 7 days and no straining the insertion site. Do not life grocery bags or the garbage can, do not run the vacuum  or  for 7 days. Start with short walks as in the hospital and gradually increase as tolerated each day. It is recommended to walk 30 minutes 5-7 days per week. Follow your physicians instructions on activity. Avoid walking outside in extremes of heat or cold. Walk inside when it is cold and windy or hot and humid. Things to keep in mind:  No driving for at least 24 hours, or as designated by your physician. Limit the number of times you go up and down the stairs  Take rests and pace yourself with activity. Be careful and do not strain with bowel movements. MEDICATIONS:    Take all medications as prescribed  Call your physician if you have any questions  Keep an updated list of your medications with you at all times and give a list to your physician and pharmacist    SIGNS AND SYMPTOMS:   Be cautious of symptoms of angina or recurrent symptoms such as chest discomfort, unusual shortness of breath or fatigue. These could be symptoms of restenosis, a new blockage or a heart attack. If your symptoms are relieved with rest it is still recommended that you notify your physician of recurrent chest pain or discomfort. For CHEST PAIN or symptoms of angina not relieved with rest:  If the discomfort is not relieved with rest, and you have been prescribed Nitroglycerin, take as directed (taken under the tongue, one at a time 5 minutes apart for a total of 3 doses). If the discomfort is not relieved after the 3rd nitroglycerin, call 911. If you have not been prescribed Nitroglycerin  and your chest discomfort is not relieved with rest, call 911. AFTER CARE:   Follow up with your physician as instructed.   Follow a heart healthy diet with proper portion control, daily stress management, daily exercise, blood pressure and cholesterol control , and smoking cessation. Patient is to resume her Eliquis on Sunday 6/6/2021  Patient is to take 81 mg Aspirin x 30 days and STOP on 7/4/2021    After 7/4/2021 patient will remain on Brilinta 90 mg PO BID and Eliquis 5 mg PO BID only.

## 2021-06-04 NOTE — Clinical Note
Balloon inserted. Balloon inflated using multiple inflation technique. Lesion #1: Pressure = 8 gabriela; Duration = 10 sec. Inflation 2: Pressure = 8 gabriela; Duration = 20 sec.

## 2021-06-04 NOTE — H&P
Office H&P reviewed. Stress test and VENECIA noted. For cardiac cath and LE angio. I discussed the risks/benefits/alternatives of the procedure with the patient. Risks include (but are not limited to) bleeding, infection, cva/mi/tamponade/death. The patient understands and agrees to proceed.

## 2021-06-04 NOTE — CARDIO/PULMONARY
Cardiac Rehab Note: chart review/referral  
 
Cardiac cath and aorta abd w/runoff 6/4/21 and stent Smoking history assessed. Patient is a former smoker. Smoking Cessation Program link has not been added to the AVS. EF 53%  on 5/24/21 per stress test 
 
CAD education folder, with heart heathy diet, warning signs, heart facts, catheterization brochure, and out patient cardiac rehab program provided to Sundar Call on 6/4/21 Educated using teach back method. Reviewed CAD diagnosis definition and purpose of intervention. Discussed risk factors for CAD to include the following: family history, elevated BMI, hyperlipidemia, hypertension, diabetes, and stress. Discussed Heart Healthy/Low Sodium (less than 2000 mg) diet. Reviewed the importance of medication compliance, follow up appointments with cardiologist, signs and symptoms of angina, and what to report to physician after discharge. Emphasized the value of cardiac rehab. Discussed Cardiac Rehab Program format, benefits, and encouraged enrollment to assist with risk modification and management. Family stated MD to address PVD issues at later date. Mobility issues currently. Secretary Oneyda verbalized understanding with questions answered.   Joseph Licea, RN

## 2021-06-04 NOTE — Clinical Note
Vessel: right iliac, CFA, PFA, SFA, popliteal, PTA and peroneal. Hand injection to the artery. Single view taken.

## 2021-06-04 NOTE — PROGRESS NOTES
Cardiac Cath Lab Recovery Arrival Note:      Iwona Austin arrived to Cardiac Cath Lab, Recovery Area. Staff introduced to patient. Patient identifiers verified with NAME and DATE OF BIRTH. Procedure verified with patient. Consent forms reviewed and signed by patient or authorized representative and verified. Allergies verified. Patient and family oriented to department. Patient and family informed of procedure and plan of care. Questions answered with review. Patient prepped for procedure, per orders from physician, prior to arrival.    Patient on cardiac monitor, non-invasive blood pressure, SPO2 monitor. On RA. Patient is A&Ox 4. Patient reports no complaints. Patient in stretcher, in low position, with side rails up, call bell within reach, patient instructed to call if assistance as needed. Patient prep in: [address], [place] [address]. Patient family has pager # none  Family in: [address] [facility] waiting area.    Prep by: Billy Tejada RN and Mayte Rey RN

## 2021-06-05 VITALS
BODY MASS INDEX: 24.33 KG/M2 | HEART RATE: 74 BPM | OXYGEN SATURATION: 96 % | TEMPERATURE: 98.4 F | HEIGHT: 67 IN | SYSTOLIC BLOOD PRESSURE: 125 MMHG | RESPIRATION RATE: 19 BRPM | DIASTOLIC BLOOD PRESSURE: 52 MMHG | WEIGHT: 155 LBS

## 2021-06-05 LAB
ANION GAP SERPL CALC-SCNC: 6 MMOL/L (ref 5–15)
BUN SERPL-MCNC: 19 MG/DL (ref 6–20)
BUN/CREAT SERPL: 21 (ref 12–20)
CALCIUM SERPL-MCNC: 8.4 MG/DL (ref 8.5–10.1)
CHLORIDE SERPL-SCNC: 110 MMOL/L (ref 97–108)
CO2 SERPL-SCNC: 25 MMOL/L (ref 21–32)
COMMENT, HOLDF: NORMAL
CREAT SERPL-MCNC: 0.92 MG/DL (ref 0.55–1.02)
GLUCOSE BLD STRIP.AUTO-MCNC: 126 MG/DL (ref 65–117)
GLUCOSE SERPL-MCNC: 142 MG/DL (ref 65–100)
POTASSIUM SERPL-SCNC: 3.4 MMOL/L (ref 3.5–5.1)
SAMPLES BEING HELD,HOLD: NORMAL
SERVICE CMNT-IMP: ABNORMAL
SODIUM SERPL-SCNC: 141 MMOL/L (ref 136–145)

## 2021-06-05 PROCEDURE — 36415 COLL VENOUS BLD VENIPUNCTURE: CPT

## 2021-06-05 PROCEDURE — 99213 OFFICE O/P EST LOW 20 MIN: CPT | Performed by: INTERNAL MEDICINE

## 2021-06-05 PROCEDURE — 74011250637 HC RX REV CODE- 250/637: Performed by: NURSE PRACTITIONER

## 2021-06-05 PROCEDURE — 80048 BASIC METABOLIC PNL TOTAL CA: CPT

## 2021-06-05 PROCEDURE — 82962 GLUCOSE BLOOD TEST: CPT

## 2021-06-05 RX ADMIN — ASPIRIN 81 MG CHEWABLE TABLET 81 MG: 81 TABLET CHEWABLE at 08:42

## 2021-06-05 RX ADMIN — GABAPENTIN 300 MG: 300 CAPSULE ORAL at 08:42

## 2021-06-05 RX ADMIN — LEVOTHYROXINE SODIUM 50 MCG: 0.05 TABLET ORAL at 08:42

## 2021-06-05 RX ADMIN — TICAGRELOR 90 MG: 90 TABLET ORAL at 08:42

## 2021-06-05 RX ADMIN — EZETIMIBE 10 MG: 10 TABLET ORAL at 08:42

## 2021-06-05 NOTE — PROGRESS NOTES
[facility]      [address]  [phone]      Cardiology Progress Note      6/5/2021 10:37 AM    Admit Date: 6/4/2021    Admit Diagnosis:   PVD (peripheral vascular disease) ([facility] [address] [I73.9]    Subjective:     Donald Lowry     No CP    Visit Vitals  BP (!) 125/52   Pulse 74   Temp 98.4 °F (36.9 °C)   Resp 19   Ht 5' 7\" (1.702 m)   Wt 155 lb (70.3 kg)   SpO2 96%   BMI 24.28 kg/m²       Current Facility-Administered Medications   Medication Dose Route Frequency    0.9% sodium chloride infusion 1,000 mL  1,000 mL IntraVENous CONTINUOUS    sodium chloride (NS) flush 5-40 mL  5-40 mL IntraVENous Q8H    sodium chloride (NS) flush 5-40 mL  5-40 mL IntraVENous PRN    acetaminophen (TYLENOL) tablet 650 mg  650 mg Oral Q4H PRN    naloxone (NARCAN) injection 0.4 mg  0.4 mg IntraVENous PRN    ticagrelor (BRILINTA) tablet 90 mg  90 mg Oral Q12H    aspirin chewable tablet 81 mg  81 mg Oral DAILY    ezetimibe (ZETIA) tablet 10 mg  10 mg Oral DAILY    gabapentin (NEURONTIN) capsule 300 mg  300 mg Oral TID    levothyroxine (SYNTHROID) tablet 50 mcg  50 mcg Oral ACB    atorvastatin (LIPITOR) tablet 20 mg  20 mg Oral QHS    insulin lispro (HUMALOG) injection   SubCUTAneous AC&HS    glucose chewable tablet 16 g  4 Tablet Oral PRN    dextrose (D50W) injection syrg 12.5-25 g  12.5-25 g IntraVENous PRN    glucagon (GLUCAGEN) injection 1 mg  1 mg IntraMUSCular PRN    [START ON 6/6/2021] apixaban (ELIQUIS) tablet 5 mg  5 mg Oral Q12H    oxybutynin chloride XL (DITROPAN XL) tablet 20 mg  20 mg Oral DAILY       Objective:      Physical Exam:  General Appearance:    Chest:   Clear  Cardiovascular: RRR  Extremities: no edema  Skin:  Warm and dry.     Data Review:   No results for input(s): WBC, HGB, HCT, PLT, HGBEXT, HCTEXT, PLTEXT in the last 72 hours.   Recent Labs     06/05/21  0510      K 3.4*   *   CO2 25   *   BUN 19   CREA 0.92   CA 8.4*       No results for input(s): TROIQ, CPK, CKMB in the last 72 hours.       Intake/Output Summary (Last 24 hours) at 6/5/2021 1037  Last data filed at 6/4/2021 2113  Gross per 24 hour   Intake    Output 650 ml   Net -650 ml        Telemetry:   EKG:  Cxray:    Assessment:     Active Problems:    PVD (peripheral vascular disease) ([address] (6/4/2021)        Plan:     Stable post PCI; home today    Bharati El M.D., [facility]

## 2021-06-05 NOTE — PROCEDURES
[address] [facility] CATH    Name:  Louis Bautista  MR#:  [id number]  :  1950  ACCOUNT #:  [de-identified]  DATE OF SERVICE:  2021    PROCEDURES PERFORMED:  1. Cardiac catheterization with left heart catheterization and coronary angiogram.  2.  Left circumflex drug-eluting stent placement. 3.  Abdominal aortogram.  4.  Bilateral lower extremity angiography. 5.  Moderate sedation for one hour. 6.  Ultrasound-guided arterial access. PREOPERATIVE DIAGNOSES:  PVD. POSTOPERATIVE DIAGNOSES:  PVD. IC:  Divya Rao MD    ESTIMATED BLOOD LOSS:  Amount of blood loss is minimal.    SPECIMENS REMOVED:  None. COMPLICATIONS:  None. ANESTHESIA:  IV conscious sedation with local anesthesia. FINDINGS:  Cardiac cath findings:  1. Left main, no significant disease. 2.  Left anterior descending artery, mild disease and a small size diagonal 1, diagonal 2, and diagonal 3 branches, without any significant disease. 3.  Left circumflex, mild disease. A previously placed stent in OM1 branch is angiographically patent. Mid left complex just after the origin of OM1 branch, has a focal 90% stenosis. OM2 branch has mild disease. 4.  Right coronary artery is dominant and previously placed stents are angiographically patent without any significant stenosis. 5.  LV gram.  Normal LVEDP of 12 and a normal LV systolic function of 23%-14%. 6.  Lower extremity angiography findings:  Abdominal aorta, no significant stenosis or aneurysm. 7.  Bilateral common iliac artery, stented angiographically patent without any significant disease. 8.  Right external iliac artery, no significant disease. 9.  Right internal iliac artery, no significant disease. 10.  Right common femoral artery, no significant disease. 11.  Right profunda femoris artery, no significant disease. 12.  Right superficial femoral artery has proximal 70% focal stenosis.   13.  Right popliteal artery, no significant disease. 14.  Right infrapopliteal vessels, one-vessel distal runoff is noted into right posterior tibial artery. 15.  Left external iliac artery, no significant disease. 16.  Left internal iliac artery, no significant disease. 17.  Left common femoral artery, no significant disease. 18.  Left profunda femoris artery, no significant disease. 19.  Left superficial femoral artery is 100% occluded in the proximal segment at the site of previously placed stent. It is reconstituted at the level of distal stent. 20.  Left popliteal artery, no significant disease. 21.  Left infrapopliteal vessels, one-vessel distal runoff into left plantar arch is noted. BRIEF PROCEDURE NOTE:  Right groin was prepped and draped, right common femoral arterial access was obtained using ultrasound guidance. A 5-Korean femoral arterial sheath was placed. Initially, a 5-Korean left and right heart catheter system and a pigtail was used for coronary angiogram and left heart catheterization. In light of angiographic findings, decision was made to proceed with PCA of left circumflex lesion. EBU 3.0 guide was used to engage left main ostia. The patient was given Angiomax for anticoagulation. A short Hussein Grandchild was advanced through OM1 branch and was advanced into distal OM1. Runthrough wire was used to cross mid LCx lesion. Predilatation was performed using 2 x 8 balloon. Afterwards, 2 x 8 [place] stent was deployed at 10 mmHg for 15 seconds. Balloon angiogram was performed, which revealed excellent angiographic result with residual stenosis of 0%. Wire and guide catheter were retrieved. The patient was given Brilinta dose of 180 mg. CONCLUSION:  1. Successful stent placement of mid circumflex coronary artery lesion. Pre-procedure stenosis of 90%. Post-procedure stenosis of 0%. 2.  Significant left superficial femoral artery lesion. Chronic total occlusion at the site of previously placed stent.   Complex scenario was discussed with the patient regarding future endovascular intervention using a left radial and left pedal access or left CFA antegrade antegrade approach. 3.  Right SFA with 70% stenosis. 4.  Bilateral infrapopliteal disease with one to two vessel distal runoff in the bilateral plantar arches.       Matthew Saldivar MD [id number]  D:  06/04/2021 13:00  T:  06/05/2021 4:33  JOB #:  [id number]

## 2021-06-05 NOTE — PROGRESS NOTES
[facility] End of Shift Note  Tele:  Significant tele event? none  Time/details of tele event: N/A    I&O/ Daily Weight:  Strict I&O ordered? NO  Fluid restriction ordered: NO  PO intake since midnight: N/A    Daily weight ordered: NO  Today's weight: N/A    Yesterday's weight: N/A   Significant weight gain reported to MD? NO    Procedure:  NPO order for upcoming test/procedure present? N/A  Procedure/test: cath  Date of procedure: 6/4/21    Discharge:  Times ambulated in hallways past shift: 1        Times OOB to chair past shift: 3  Plan/Discharge barriers identified?: none  Has this patient received a stent this admission? YES  If yes, verify patient has aspirin, antiplatelet, statin, BB, ACE/ARB (for EF < 40%) ordered. NO  If not ordered, which medication is missing? no BB on board, no ACE/ARB required with EF of 53% N/A     Other concerns:  Any nurse/patient concerns to be addressed by MD? pt may need BB ordered post stent placement    Bedside shift change report given to Caitlin Remy (oncoming nurse) by Vanesa Honeycutt (offgoing nurse). Report included the following information SBAR, Procedure Summary, Intake/Output and Cardiac Rhythm NSR.      Vanesa Honeycutt

## 2021-06-07 RX ORDER — APIXABAN 5 MG/1
TABLET, FILM COATED ORAL
Qty: 60 TABLET | Refills: 0 | Status: SHIPPED | OUTPATIENT
Start: 2021-06-07 | End: 2021-07-17

## 2021-06-22 ENCOUNTER — OFFICE VISIT (OUTPATIENT)
Dept: CARDIOLOGY CLINIC | Age: 71
End: 2021-06-22
Payer: MEDICARE

## 2021-06-22 VITALS
OXYGEN SATURATION: 97 % | SYSTOLIC BLOOD PRESSURE: 124 MMHG | RESPIRATION RATE: 16 BRPM | WEIGHT: 153.9 LBS | HEART RATE: 75 BPM | HEIGHT: 67 IN | BODY MASS INDEX: 24.16 KG/M2 | DIASTOLIC BLOOD PRESSURE: 62 MMHG

## 2021-06-22 DIAGNOSIS — I10 ESSENTIAL HYPERTENSION: ICD-10-CM

## 2021-06-22 DIAGNOSIS — I73.9 PAD (PERIPHERAL ARTERY DISEASE) (HCC): Primary | ICD-10-CM

## 2021-06-22 DIAGNOSIS — I48.0 PAF (PAROXYSMAL ATRIAL FIBRILLATION) (HCC): ICD-10-CM

## 2021-06-22 DIAGNOSIS — Z95.5 S/P CORONARY ARTERY STENT PLACEMENT: ICD-10-CM

## 2021-06-22 DIAGNOSIS — E78.2 MIXED HYPERLIPIDEMIA: ICD-10-CM

## 2021-06-22 DIAGNOSIS — I25.10 ATHEROSCLEROSIS OF NATIVE CORONARY ARTERY OF NATIVE HEART WITHOUT ANGINA PECTORIS: ICD-10-CM

## 2021-06-22 PROCEDURE — G8754 DIAS BP LESS 90: HCPCS | Performed by: INTERNAL MEDICINE

## 2021-06-22 PROCEDURE — 3288F FALL RISK ASSESSMENT DOCD: CPT | Performed by: INTERNAL MEDICINE

## 2021-06-22 PROCEDURE — 99214 OFFICE O/P EST MOD 30 MIN: CPT | Performed by: INTERNAL MEDICINE

## 2021-06-22 PROCEDURE — G8399 PT W/DXA RESULTS DOCUMENT: HCPCS | Performed by: INTERNAL MEDICINE

## 2021-06-22 PROCEDURE — G8536 NO DOC ELDER MAL SCRN: HCPCS | Performed by: INTERNAL MEDICINE

## 2021-06-22 PROCEDURE — G8420 CALC BMI NORM PARAMETERS: HCPCS | Performed by: INTERNAL MEDICINE

## 2021-06-22 PROCEDURE — 1090F PRES/ABSN URINE INCON ASSESS: CPT | Performed by: INTERNAL MEDICINE

## 2021-06-22 PROCEDURE — 3017F COLORECTAL CA SCREEN DOC REV: CPT | Performed by: INTERNAL MEDICINE

## 2021-06-22 PROCEDURE — G8427 DOCREV CUR MEDS BY ELIG CLIN: HCPCS | Performed by: INTERNAL MEDICINE

## 2021-06-22 PROCEDURE — 1100F PTFALLS ASSESS-DOCD GE2>/YR: CPT | Performed by: INTERNAL MEDICINE

## 2021-06-22 PROCEDURE — G8752 SYS BP LESS 140: HCPCS | Performed by: INTERNAL MEDICINE

## 2021-06-22 PROCEDURE — G8510 SCR DEP NEG, NO PLAN REQD: HCPCS | Performed by: INTERNAL MEDICINE

## 2021-06-22 NOTE — PROGRESS NOTES
[address] [address] [address] [facility]  [phone]     Subjective:      Jaswant De is a 72 y.o. female is here for f/u s/p LHC and aorta abd w/runoff 6/4/21 and stent. She has pmhx CAD s/p stent, PAD, PAF, HTN, HLD, DM and CVA. Today, sob is a lot better. Still having some leg discomfort left worse than right. The patient denies chest pain/ shortness of breath, orthopnea, PND, LE edema, palpitations, syncope, or presyncope. 6/4/2021     PROCEDURES PERFORMED:  1. Cardiac catheterization with left heart catheterization and coronary angiogram.  2.  Left circumflex drug-eluting stent placement. 3.  Abdominal aortogram.  4.  Bilateral lower extremity angiography. 5.  Moderate sedation for one hour. 6.  Ultrasound-guided arterial access. CONCLUSION:  1. Successful stent placement of mid circumflex coronary artery lesion. Pre-procedure stenosis of 90%. Post-procedure stenosis of 0%. 2.  Significant left superficial femoral artery lesion. Chronic total occlusion at the site of previously placed stent. Complex scenario was discussed with the patient regarding future endovascular intervention using a left radial and left pedal access or left CFA antegrade antegrade approach. 3.  Right SFA with 70% stenosis.   4.  Bilateral infrapopliteal disease with one to two vessel distal runoff in the bilateral plantar arches.     Patient Active Problem List    Diagnosis Date Noted    PVD (peripheral vascular disease) ([address] 06/04/2021    PAF (paroxysmal atrial fibrillation) ([address] 07/30/2020    Hypotension 12/23/2019    SAHNI (dyspnea on exertion) 11/18/2019    Dizziness 08/13/2015    Peripheral vascular angioplasty status 08/14/2013    Angina, class III ([address] 06/18/2013    Angina pectoris ([address] 06/07/2013    S/P coronary artery stent placement 10/05/2012    SOB (shortness of breath) 10/02/2012    Atherosclerosis of native artery of extremity with intermittent claudication ([address] 10/02/2012    TIA (transient ischemic attack) 05/22/2012    Mixed hyperlipidemia 05/22/2012    DM type 1 (diabetes mellitus, type 1) ([address] 05/22/2012    S/P PTCA (percutaneous transluminal coronary angioplasty) 01/14/2012    HTN (hypertension) 01/13/2012    Hypothyroidism 01/13/2012    Hyperlipidemia 01/13/2012    Coronary atherosclerosis of native coronary artery 01/05/2012    Abnormal cardiovascular stress test 01/05/2012    PAD (peripheral artery disease) ([address] 01/05/2012      Jean Pierre Lainez MD  Past Medical History:   Diagnosis Date    Arthritis     BILAT HANDS    CAD (coronary artery disease)     high cholesterol; heart murmur    Cancer (HCC)     uterine    Chronic kidney disease     Diabetes ([address]     Endocrine disease     hypothyroidism    Hypertension     Neurological disorder     TIA    Other ill-defined conditions(799.89)     PVD; benign right breast tumor removed    Peripheral vascular angioplasty status 8/14/2013 8/14/13 s/p angioplasty L RFA    Stroke ([facility]     TIA  X3 NO RESIDUAL    Thyroid disease     HYPOTHYROID      Past Surgical History:   Procedure Laterality Date    HX ANKLE FRACTURE TX      pins and plates right ankle    HX BREAST BIOPSY Right     negative  1998    HX GYN      hysterectomy    HX OTHER SURGICAL      fem pop bypass on left    SD BREAST SURGERY PROCEDURE UNLISTED      tumor removed    SD CARDIAC SURG PROCEDURE UNLIST      CARDIAC STENT  X2     Allergies   Allergen Reactions    Pcn [Penicillins] Anaphylaxis    Codeine Palpitations    Ivp Dye [Fd And C Blue No.1] Nausea and Vomiting     \"I was told in [facility] that it would stop my kidney up if I got it again. \"      Family History   Problem Relation Age of Onset   Miguel Krause Cancer Mother     Hypertension Sister     Hypertension Sister     Heart Disease Brother 72    Stroke Sister         x2 sisters    Diabetes Father     Cancer Father       Social History     Socioeconomic History    Marital status:      Spouse name: Delores Spencer    Number of children: 5    Years of education: Not on file    Highest education level: Not on file   Occupational History     Employer: NOT EMPLOYED   Tobacco Use    Smoking status: Former Smoker     Packs/day: 5.00     Years: 30.00     Pack years: 150.00     Quit date: 2000     Years since quittin.8    Smokeless tobacco: Never Used   Substance and Sexual Activity    Alcohol use: No     Alcohol/week: 0.0 standard drinks    Drug use: No    Sexual activity: Not on file   Other Topics Concern     Service Not Asked    Blood Transfusions Not Asked    Caffeine Concern Not Asked    Occupational Exposure Not Asked    Hobby Hazards Not Asked    Sleep Concern Not Asked    Stress Concern Not Asked    Weight Concern Not Asked    Special Diet Not Asked    Back Care Not Asked    Exercise Not Asked    Bike Helmet Not Asked    [address] Not Asked    Self-Exams Not Asked   Social History Narrative    Not on file     Social Determinants of Health     Financial Resource Strain:     Difficulty of Paying Living Expenses:    Food Insecurity:     Worried About Running Out of Food in the Last Year:     Ran Out of Food in the Last Year:    Transportation Needs:     Lack of Transportation (Medical):      Lack of Transportation (Non-Medical):    Physical Activity:     Days of Exercise per Week:     Minutes of Exercise per Session:    Stress:     Feeling of Stress :    Social Connections:     Frequency of Communication with Friends and Family:     Frequency of Social Gatherings with Friends and Family:     Attends Hoahaoism Services:     Active Member of Clubs or Organizations:     Attends Club or Organization Meetings:     Marital Status:    Intimate Partner Violence:     Fear of Current or Ex-Partner:     Emotionally Abused:     Physically Abused:     Sexually Abused:       Current Outpatient Medications   Medication Sig    Eliquis 5 mg tablet Take 1 tablet by mouth twice daily    aspirin 81 mg chewable tablet Take 1 Tablet by mouth daily.  ticagrelor (Brilinta) 90 mg tablet Take 1 Tablet by mouth two (2) times a day.  ezetimibe (Zetia) 10 mg tablet Take 1 Tab by mouth daily.  alendronate (FOSAMAX) 10 mg tablet Take 10 mg by mouth daily.  simvastatin (ZOCOR) 40 mg tablet Take 40 mg by mouth daily.  gabapentin (NEURONTIN) 300 mg capsule Take 300 mg by mouth three (3) times daily.  insulin lispro (HUMALOG U-100 INSULIN) 100 unit/mL injection by SubCUTAneous route. INSULIN PUMP    nitroglycerin (NITROSTAT) 0.4 mg SL tablet 1 Tab by SubLINGual route every five (5) minutes as needed for Chest Pain.  calcium 500 mg tab Take 1 Tablet by mouth daily.  cholecalciferol, vitamin D3, (VITAMIN D3) 4,000 unit cap Take  by mouth daily.  levothyroxine (SYNTHROID) 50 mcg tablet Take 50 mcg by mouth daily (before breakfast).  CINNAMON BARK (CINNAMON PO) Take 2,000 mg by mouth daily. No current facility-administered medications for this visit. Review of Symptoms:  11 systems reviewed, negative other than as stated in the HPI    Physical ExamPhysical Exam:    Vitals:    06/22/21 1028 06/22/21 1036   BP: (!) 146/86 124/62   Pulse: 75    Resp: 16    SpO2: 97%    Weight: 153 lb 14.4 oz (69.8 kg)    Height: 5' 7\" (1.702 m)      Body mass index is 24.1 kg/m². General PE  Gen:  NAD  Mental Status - Alert. General Appearance - Not in acute distress. HEENT:  PERRL, no carotid bruits or JVD  Chest and Lung Exam   Inspection: Accessory muscles - No use of accessory muscles in breathing. Auscultation:   Breath sounds: - Normal.   Cardiovascular   Inspection: Jugular vein - Bilateral - Inspection Normal.   Palpation/Percussion:   Apical Impulse: - Normal.   Auscultation: Rhythm - Regular. Heart Sounds - S1 WNL and S2 WNL. No S3 or S4. Murmurs & Other Heart Sounds: Auscultation of the heart reveals - No Murmurs.    Peripheral Vascular Upper Extremity: Inspection - Bilateral - No Cyanotic nailbeds or Digital clubbing. Lower Extremity:   Palpation: Edema - Bilateral - No edema. Abdomen:   Soft, non-tender, bowel sounds are active.   Neuro: A&O times 3, CN and motor grossly WNL    Labs:   Lab Results   Component Value Date/Time    Cholesterol, total 174 12/03/2019 11:16 AM    Cholesterol, total 172 02/29/2016 09:32 AM    Cholesterol, total 161 04/11/2014 09:53 AM    Cholesterol, total 160 11/07/2012 08:42 AM    Cholesterol, total 161 09/07/2010 10:21 AM    HDL Cholesterol 68 12/03/2019 11:16 AM    HDL Cholesterol 74 02/29/2016 09:32 AM    HDL Cholesterol 76 04/11/2014 09:53 AM    HDL Cholesterol 71 11/07/2012 08:42 AM    HDL Cholesterol 86 09/07/2010 10:21 AM    LDL, calculated 96 12/03/2019 11:16 AM    LDL, calculated 83 02/29/2016 09:32 AM    LDL, calculated 73 04/11/2014 09:53 AM    LDL, calculated 77 11/07/2012 08:42 AM    LDL, calculated 68 09/07/2010 10:21 AM    Triglyceride 51 12/03/2019 11:16 AM    Triglyceride 76 02/29/2016 09:32 AM    Triglyceride 58 04/11/2014 09:53 AM    Triglyceride 62 11/07/2012 08:42 AM    Triglyceride 35 09/07/2010 10:21 AM    CHOL/HDL Ratio 1.9 09/07/2010 10:21 AM    CHOL/HDL Ratio 2.5 04/27/2010 09:11 AM    CHOL/HDL Ratio 2.5 03/15/2010 02:15 AM    CHOL/HDL Ratio 3.6 01/04/2010 09:00 AM    CHOL/HDL Ratio 2.6 09/16/2009 09:05 AM     Lab Results   Component Value Date/Time    CK 69 05/06/2010 06:09 PM     Lab Results   Component Value Date/Time    Sodium 141 06/05/2021 05:10 AM    Potassium 3.4 (L) 06/05/2021 05:10 AM    Chloride 110 (H) 06/05/2021 05:10 AM    CO2 25 06/05/2021 05:10 AM    Anion gap 6 06/05/2021 05:10 AM    Glucose 142 (H) 06/05/2021 05:10 AM    BUN 19 06/05/2021 05:10 AM    Creatinine 0.92 06/05/2021 05:10 AM    BUN/Creatinine ratio 21 (H) 06/05/2021 05:10 AM    GFR est AA >60 06/05/2021 05:10 AM    GFR est non-AA >60 06/05/2021 05:10 AM    Calcium 8.4 (L) 06/05/2021 05:10 AM    Bilirubin, total 0.5 05/08/2021 02:32 PM    Alk. phosphatase 110 05/08/2021 02:32 PM    Protein, total 7.1 05/08/2021 02:32 PM    Albumin 3.4 (L) 05/08/2021 02:32 PM    Globulin 3.7 05/08/2021 02:32 PM    A-G Ratio 0.9 (L) 05/08/2021 02:32 PM    ALT (SGPT) 21 05/08/2021 02:32 PM       EKG:         Assessment:     Assessment:      1. PAD (peripheral artery disease) ([facility]    2. Atherosclerosis of native coronary artery of native heart without angina pectoris    3. S/P coronary artery stent placement    4. Essential hypertension    5. Mixed hyperlipidemia    6. PAF (paroxysmal atrial fibrillation) ([facility])        No orders of the defined types were placed in this encounter. Plan:     Atherosclerosis of native coronary artery of native heart without angina pectoris  Hx of ARCHANA to LCx and RCA . [facility] 6/2021: s/p PCI/ARCHANA to left Cx  Intolerant to BB therapy. Nitrates & CCB cause orthostatic hypotension. Continue ASA x 1 mos then stop as she is also on [address] [place] [address] Road for PAF. Continue Brilinta 90 mg BID x 6 mos. Will restart ASA once off Brilinta  Continue statin    PAF  Short run of a. Fib noted on monitor in 7/2020  Continue Eliquis 5 mg BID   Stable.     Essential hypertension  Controlled with current therapy     Mixed hyperlipidemia  On statin. FLP done by endo.      PAD (peripheral artery disease) ([facility]  LE angio 6/2021 noted. Previous interventions include:  S/p right SFA atherectomy and DCB PTA 10/2017.    S/p b/l iliac stents 7/2014.    s/p left SFA laser athetectomy and stent 9/2011  s/p left SFA angioplasty for ISR 1/2012.   s/p left SFA redo laser atherectomy and PTA 10/2012  S/p left SFA angioplasty 8/2013    Roopa Chahal NP       Patient seen and examined by me with nurse practitioner. I personally performed all components of the history, physical, and medical decision making and agree with the assessment and plan as noted. Feeling better from cardiac standpoint after PCI. C/o let LE limiting claudication.  Wants to proceed with left SFA intervention. On statin. BP controlled. DAPT for 30 days along with DOAC, then brilinta/eliquis.      Kaveh Jessica MD

## 2021-07-02 LAB
BASOPHILS # BLD AUTO: 0.1 X10E3/UL (ref 0–0.2)
BASOPHILS NFR BLD AUTO: 1 %
BUN SERPL-MCNC: 17 MG/DL (ref 8–27)
BUN/CREAT SERPL: 16 (ref 12–28)
CALCIUM SERPL-MCNC: 8.9 MG/DL (ref 8.7–10.3)
CHLORIDE SERPL-SCNC: 105 MMOL/L (ref 96–106)
CO2 SERPL-SCNC: 25 MMOL/L (ref 20–29)
CREAT SERPL-MCNC: 1.07 MG/DL (ref 0.57–1)
EOSINOPHIL # BLD AUTO: 0.1 X10E3/UL (ref 0–0.4)
EOSINOPHIL NFR BLD AUTO: 2 %
ERYTHROCYTE [DISTWIDTH] IN BLOOD BY AUTOMATED COUNT: 13.9 % (ref 11.7–15.4)
GLUCOSE SERPL-MCNC: 93 MG/DL (ref 65–99)
HCT VFR BLD AUTO: 39.5 % (ref 34–46.6)
HGB BLD-MCNC: 12.7 G/DL (ref 11.1–15.9)
IMM GRANULOCYTES # BLD AUTO: 0 X10E3/UL (ref 0–0.1)
IMM GRANULOCYTES NFR BLD AUTO: 0 %
INR PPP: 1.2 (ref 0.9–1.2)
INTERPRETATION: NORMAL
LYMPHOCYTES # BLD AUTO: 0.9 X10E3/UL (ref 0.7–3.1)
LYMPHOCYTES NFR BLD AUTO: 17 %
MCH RBC QN AUTO: 29.2 PG (ref 26.6–33)
MCHC RBC AUTO-ENTMCNC: 32.2 G/DL (ref 31.5–35.7)
MCV RBC AUTO: 91 FL (ref 79–97)
MONOCYTES # BLD AUTO: 0.4 X10E3/UL (ref 0.1–0.9)
MONOCYTES NFR BLD AUTO: 8 %
NEUTROPHILS # BLD AUTO: 3.8 X10E3/UL (ref 1.4–7)
NEUTROPHILS NFR BLD AUTO: 72 %
PLATELET # BLD AUTO: 186 X10E3/UL (ref 150–450)
POTASSIUM SERPL-SCNC: 4.7 MMOL/L (ref 3.5–5.2)
PROTHROMBIN TIME: 12.3 SEC (ref 9.1–12)
RBC # BLD AUTO: 4.35 X10E6/UL (ref 3.77–5.28)
SODIUM SERPL-SCNC: 140 MMOL/L (ref 134–144)
SPECIMEN STATUS REPORT, ROLRST: NORMAL
WBC # BLD AUTO: 5.3 X10E3/UL (ref 3.4–10.8)

## 2021-07-09 ENCOUNTER — HOSPITAL ENCOUNTER (OUTPATIENT)
Age: 71
Discharge: HOME | End: 2021-07-09
Attending: INTERNAL MEDICINE | Admitting: INTERNAL MEDICINE
Payer: MEDICARE

## 2021-07-09 VITALS
DIASTOLIC BLOOD PRESSURE: 60 MMHG | WEIGHT: 155 LBS | BODY MASS INDEX: 24.33 KG/M2 | HEART RATE: 80 BPM | RESPIRATION RATE: 15 BRPM | SYSTOLIC BLOOD PRESSURE: 156 MMHG | TEMPERATURE: 98.9 F | OXYGEN SATURATION: 91 % | HEIGHT: 67 IN

## 2021-07-09 DIAGNOSIS — I73.9 PVD (PERIPHERAL VASCULAR DISEASE) (HCC): ICD-10-CM

## 2021-07-09 LAB
ACT BLD: 120 SECS (ref 79–138)
ACT BLD: 301 SECS (ref 79–138)
GLUCOSE BLD STRIP.AUTO-MCNC: 116 MG/DL (ref 65–117)
GLUCOSE BLD STRIP.AUTO-MCNC: 94 MG/DL (ref 65–117)
SERVICE CMNT-IMP: NORMAL
SERVICE CMNT-IMP: NORMAL

## 2021-07-09 PROCEDURE — 74011000250 HC RX REV CODE- 250: Performed by: INTERNAL MEDICINE

## 2021-07-09 PROCEDURE — 2709999900 HC NON-CHARGEABLE SUPPLY: Performed by: INTERNAL MEDICINE

## 2021-07-09 PROCEDURE — 82962 GLUCOSE BLOOD TEST: CPT

## 2021-07-09 PROCEDURE — 85347 COAGULATION TIME ACTIVATED: CPT

## 2021-07-09 PROCEDURE — 77030028837 HC SYR ANGI PWR INJ COEU -A: Performed by: INTERNAL MEDICINE

## 2021-07-09 PROCEDURE — C1769 GUIDE WIRE: HCPCS | Performed by: INTERNAL MEDICINE

## 2021-07-09 PROCEDURE — C1876 STENT, NON-COA/NON-COV W/DEL: HCPCS | Performed by: INTERNAL MEDICINE

## 2021-07-09 PROCEDURE — 99153 MOD SED SAME PHYS/QHP EA: CPT | Performed by: INTERNAL MEDICINE

## 2021-07-09 PROCEDURE — 37226 PR REVSC OPN/PRQ FEM/POP W/STNT/ANGIOP SM VSL: CPT | Performed by: INTERNAL MEDICINE

## 2021-07-09 PROCEDURE — C1894 INTRO/SHEATH, NON-LASER: HCPCS | Performed by: INTERNAL MEDICINE

## 2021-07-09 PROCEDURE — 75716 ARTERY X-RAYS ARMS/LEGS: CPT | Performed by: INTERNAL MEDICINE

## 2021-07-09 PROCEDURE — 36247 INS CATH ABD/L-EXT ART 3RD: CPT | Performed by: INTERNAL MEDICINE

## 2021-07-09 PROCEDURE — C1887 CATHETER, GUIDING: HCPCS | Performed by: INTERNAL MEDICINE

## 2021-07-09 PROCEDURE — 2709999900 HC NON-CHARGEABLE SUPPLY

## 2021-07-09 PROCEDURE — 76937 US GUIDE VASCULAR ACCESS: CPT | Performed by: INTERNAL MEDICINE

## 2021-07-09 PROCEDURE — 74011000636 HC RX REV CODE- 636: Performed by: INTERNAL MEDICINE

## 2021-07-09 PROCEDURE — 99152 MOD SED SAME PHYS/QHP 5/>YRS: CPT | Performed by: INTERNAL MEDICINE

## 2021-07-09 PROCEDURE — 37224 HC PTA FEMPOP UNI: CPT | Performed by: INTERNAL MEDICINE

## 2021-07-09 PROCEDURE — 77030019698 HC SYR ANGI MDLON MRTM -A: Performed by: INTERNAL MEDICINE

## 2021-07-09 PROCEDURE — 77030008543 HC TBNG MON PRSS MRTM -A: Performed by: INTERNAL MEDICINE

## 2021-07-09 PROCEDURE — 77030019697 HC SYR ANGI INFL MRTM -B: Performed by: INTERNAL MEDICINE

## 2021-07-09 PROCEDURE — 77030010221 HC SPLNT WR POS TELE -B: Performed by: INTERNAL MEDICINE

## 2021-07-09 PROCEDURE — 74011250637 HC RX REV CODE- 250/637: Performed by: INTERNAL MEDICINE

## 2021-07-09 PROCEDURE — 36200 PLACE CATHETER IN AORTA: CPT | Performed by: INTERNAL MEDICINE

## 2021-07-09 PROCEDURE — 74011250636 HC RX REV CODE- 250/636: Performed by: INTERNAL MEDICINE

## 2021-07-09 DEVICE — MISAGO RX SELF-EXPANDING PERIPHERAL STENT
Type: IMPLANTABLE DEVICE | Status: FUNCTIONAL
Brand: R2P MISAGO

## 2021-07-09 RX ORDER — HEPARIN SODIUM 1000 [USP'U]/ML
INJECTION, SOLUTION INTRAVENOUS; SUBCUTANEOUS AS NEEDED
Status: DISCONTINUED | OUTPATIENT
Start: 2021-07-09 | End: 2021-07-09 | Stop reason: HOSPADM

## 2021-07-09 RX ORDER — IODIXANOL 320 MG/ML
INJECTION, SOLUTION INTRAVASCULAR AS NEEDED
Status: DISCONTINUED | OUTPATIENT
Start: 2021-07-09 | End: 2021-07-09 | Stop reason: HOSPADM

## 2021-07-09 RX ORDER — DIPHENHYDRAMINE HYDROCHLORIDE 50 MG/ML
INJECTION, SOLUTION INTRAMUSCULAR; INTRAVENOUS AS NEEDED
Status: DISCONTINUED | OUTPATIENT
Start: 2021-07-09 | End: 2021-07-09 | Stop reason: HOSPADM

## 2021-07-09 RX ORDER — FENTANYL CITRATE 50 UG/ML
INJECTION, SOLUTION INTRAMUSCULAR; INTRAVENOUS AS NEEDED
Status: DISCONTINUED | OUTPATIENT
Start: 2021-07-09 | End: 2021-07-09 | Stop reason: HOSPADM

## 2021-07-09 RX ORDER — HYDROCORTISONE SODIUM SUCCINATE 100 MG/2ML
INJECTION, POWDER, FOR SOLUTION INTRAMUSCULAR; INTRAVENOUS AS NEEDED
Status: DISCONTINUED | OUTPATIENT
Start: 2021-07-09 | End: 2021-07-09 | Stop reason: HOSPADM

## 2021-07-09 RX ORDER — CLOPIDOGREL BISULFATE 75 MG/1
75 TABLET ORAL DAILY
Qty: 90 TABLET | Refills: 3 | Status: SHIPPED | OUTPATIENT
Start: 2021-07-09 | End: 2021-10-11 | Stop reason: SDUPTHER

## 2021-07-09 RX ORDER — HEPARIN SODIUM 200 [USP'U]/100ML
INJECTION, SOLUTION INTRAVENOUS
Status: COMPLETED | OUTPATIENT
Start: 2021-07-09 | End: 2021-07-09

## 2021-07-09 RX ORDER — LIDOCAINE HYDROCHLORIDE 10 MG/ML
INJECTION, SOLUTION EPIDURAL; INFILTRATION; INTRACAUDAL; PERINEURAL AS NEEDED
Status: DISCONTINUED | OUTPATIENT
Start: 2021-07-09 | End: 2021-07-09 | Stop reason: HOSPADM

## 2021-07-09 RX ORDER — MIDAZOLAM HYDROCHLORIDE 1 MG/ML
INJECTION, SOLUTION INTRAMUSCULAR; INTRAVENOUS AS NEEDED
Status: DISCONTINUED | OUTPATIENT
Start: 2021-07-09 | End: 2021-07-09 | Stop reason: HOSPADM

## 2021-07-09 RX ORDER — GUAIFENESIN 100 MG/5ML
LIQUID (ML) ORAL AS NEEDED
Status: DISCONTINUED | OUTPATIENT
Start: 2021-07-09 | End: 2021-07-09 | Stop reason: HOSPADM

## 2021-07-09 RX ORDER — PROTAMINE SULFATE 10 MG/ML
INJECTION, SOLUTION INTRAVENOUS AS NEEDED
Status: DISCONTINUED | OUTPATIENT
Start: 2021-07-09 | End: 2021-07-09 | Stop reason: HOSPADM

## 2021-07-09 RX ORDER — VERAPAMIL HYDROCHLORIDE 2.5 MG/ML
INJECTION, SOLUTION INTRAVENOUS AS NEEDED
Status: DISCONTINUED | OUTPATIENT
Start: 2021-07-09 | End: 2021-07-09 | Stop reason: HOSPADM

## 2021-07-09 RX ORDER — SODIUM CHLORIDE 9 MG/ML
100 INJECTION, SOLUTION INTRAVENOUS CONTINUOUS
Status: DISCONTINUED | OUTPATIENT
Start: 2021-07-09 | End: 2021-07-09 | Stop reason: HOSPADM

## 2021-07-09 RX ORDER — LISINOPRIL 10 MG/1
20 TABLET ORAL DAILY
COMMUNITY

## 2021-07-09 RX ADMIN — SODIUM CHLORIDE 100 ML/HR: 900 INJECTION, SOLUTION INTRAVENOUS at 10:00

## 2021-07-09 NOTE — Clinical Note
Peripheral Lesion 1. Self-expanding stent inserted.     6.0-60 [facility]

## 2021-07-09 NOTE — PROGRESS NOTES
SHEATH PULL NOTE:    Patient informed of procedure with questions answered with review. Sheath site prepped with Chloraprep swab. 7 fr sheath in RBA pulled by JOSE DAVID Hughes RN. Hand hold with quick clot, with manual compression to site. No bleeding, no hematoma, no pain at site. Hemostasis obtained with hand hold/manual compression at site. Patient tolerated well. No change in status. Handhold for 15 minutes. No change at site. Quick clot and tegaderm dressing applied to site. No bleeding, no hematoma, no pain/discomfort at site. Groin instructions provided with review. Continue to monitor procedure site and patient status. *Advised patient to keep head flat and extremity flat to decrease risk of bleeding. *Recommended that patient not drink for ONE HOUR post sheath pull completion. *Recommended that patient not eat for TWO HOURS post sheath pull completion. *Instructed patient on rationale for delay of PO products to decrease risk for aspiration and if additional treatment to procedure site is required. Patient verbalized understanding of instructions with review. SREEKANTH Pena observed and palpated site. Educated RN and pt on need to keep arm straight and to avoid bending, Arm board left at bedside, unable to place d/t hyperextension of elbow (brachial site) - arm placed on pillow, both pt and RN verbalized understanding of same.

## 2021-07-09 NOTE — PROCEDURES
[address]  PROCEDURE NOTE    Name:  Art Marin  MR#:  [id number]  :  1950  ACCOUNT #:  [de-identified]  DATE OF SERVICE:  2021    PREOPERATIVE DIAGNOSIS:  PVD. POSTOPERATIVE DIAGNOSIS:  PVD. PROCEDURE PERFORMED:  1. Left SFA and balloon angioplasty and stent placement. 2.  Third-order catheter placement from right brachial arterial access site into left superficial femoral artery. 3.  Ultrasound-guided arterial access. 4.  Moderate sedation for one hour. IC:  Norbert Nevarez MD    ANESTHESIA:  IV conscious sedation and local anesthesia. ESTIMATED BLOOD LOSS:  Minimal.    SPECIMENS REMOVED:  None. COMPLICATIONS:  None. BRIEF PROCEDURE NOTE:  Right brachial arterial access site was prepped and draped. Right brachial arterial access was obtained using ultrasound guidance. A 5-Tajik sheath was placed. A long Versacore wire was advanced into left common femoral artery from right brachial arterial access site. Afterwards, a 0.035 Quick-Cross catheter and Advantage wire was used to cross the left SFA . After confirming intraluminal location of 0.035 Quick-Cross catheter in the distal SFA, a glide Advantage wire was exchanged for long Versacore wire. Terumo 149 cm radial peripheral sheath was placed over long Versacore wire into the left proximal superficial femoral artery. Predilation was performed using 5 x 150 Metacross peripheral balloon from brachial arterial access site. Afterwards, a 6 x 150 and 6 x 16 Misago peripheral stent was deployed across left SFA  without any difficulty. Postdilatation was performed using 6 x 150 Metacross balloon. Final angiogram was performed, which revealed excellent angiographic result. CONCLUSION:  Successful balloon angioplasty and stent placement of the left SFA  from right brachial arterial access site. Preprocedure stenosis of 100%. Postprocedure stenosis of 0% with excellent flow. Hemostasis achieved using manual pressure after reversal of heparin with protamine.         Cliff Hatch MD [id number]  D:  07/09/2021 9:42  T:  07/09/2021 19:19  JOB #:  [id number]

## 2021-07-09 NOTE — DISCHARGE INSTRUCTIONS
[address] [place] [address]  [phone]      [address] [place] [address] INSTRUCTIONS      Patient ID:  Lynette Crocker  [id number]  32 y.o.  1950    Admit Date: 7/9/2021    Discharge Date: 7/9/2021     Admitting Physician: Federica Vasquez MD     Discharge Physician: Federica Vasquez MD    Admission Diagnoses:   PVD (peripheral vascular disease) [facility]) [I73.9]    Discharge Diagnoses: Active Problems:    * No active hospital problems. *      Discharge Condition: Good    Cardiology Procedures this Admission:  Left SFA stent. Disposition: home    Reference discharge instructions provided by nursing for diet and activity. Signed: Federica Vasquez MD  7/9/2021  9:37 AM  PERIPHERAL CATHETERIZATION/PCI DISCHARGE INSTRUCTIONS    It is normal to feel tired the first couple days. Take it easy and follow the physicians instructions. CHECK THE CATHETER INSERTION SITE DAILY:    You may shower 24 hours after the procedure, remove the bandage during showering. Wash with soap and water and pat dry. Gentle cleaning of the site with soap and water is sufficient, cover with a dry clean dressing or bandage. Do not apply creams or powders to the area. Do not sit in a bathtub or pool of water for 7 days or until wound has completely healed. Temporary bruising and discomfort is normal and may last a few weeks. You may have a  formation of a small lump at the site which may last up to 6 weeks. CALL THE PHYSICIANS:    If the site becomes red, swollen or feels warm to the touch  If there is bleeding or drainage or if there is unusual pain at the groin or down the leg. If there is any bleeding, lie down, apply pressure or have someone apply pressure with a clean cloth until the bleeding stops. If the bleeding continues, call 911 to be transported to the hospital.  DO NOT DRIVE YOURSELF, [address].     ACTIVITY:    For the first 24-48 hours or as instructed by the physician:  No lifting, pushing or pulling over 10 pounds and no straining the insertion site. Do not life grocery bags or the garbage can, do not run the vacuum  or  for 7 days. Start with short walks as in the hospital and gradually increase as tolerated each day. It is recommended to walk 30 minutes 5-7 days per week. Follow your physicians instructions on activity. Avoid walking outside in extremes of heat or cold. Walk inside when it is cold and windy or hot and humid. Things to keep in mind:  No driving for at least 24 hours, or as designated by your physician. Limit the number of times you go up and down the stairs  Take rests and pace yourself with activity. Be careful and do not strain with bowel movements. MEDICATIONS:    Take all medications as prescribed  Call your physician if you have any questions  Keep an updated list of your medications with you at all times and give a list to your physician and pharmacist      AFTER CARE:    Follow up with your physician as instructed. Follow a heart healthy diet with proper portion control, daily stress management, daily exercise, blood pressure and cholesterol control , and smoking cessation.

## 2021-07-09 NOTE — PROGRESS NOTES
Cardiac Cath Lab Recovery Arrival Note:      Benita Roblero arrived to Cardiac Cath Lab, Recovery Area. Staff introduced to patient. Patient identifiers verified with NAME and DATE OF BIRTH. Procedure verified with patient. Consent forms reviewed and signed by patient or authorized representative and verified. Allergies verified. Patient and family oriented to department. Patient and family informed of procedure and plan of care. Questions answered with review. Patient prepped for procedure, per orders from physician, prior to arrival.    Patient on cardiac monitor, non-invasive blood pressure, SPO2 monitor. On RA. Patient is A&Ox 4. Patient reports no complaints. Patient in stretcher, in low position, with side rails up, call bell within reach, patient instructed to call if assistance as needed. Patient prep in: [address], [place] [address]. Patient family has pager #   Family in: [facility] waiting room.    Prep by: Rozetta Galeazzi, RN's

## 2021-07-09 NOTE — Clinical Note
Peripheral Lesion 1. Lesion #1: Pressure = 10 gabriela; Duration = 120 sec. Lesion #2: Pressure = 10 gabriela; Duration = 120 sec.

## 2021-07-09 NOTE — Clinical Note
TRANSFER - OUT REPORT:     Verbal report given to: Terry Pierre. Report consisted of patient's Situation, Background, Assessment and   Recommendations(SBAR). Opportunity for questions and clarification was provided. Patient transported with a Registered Nurse. [facility]

## 2021-07-09 NOTE — Clinical Note
Peripheral Lesion 1. Lesion #1: Pressure = 8 gabriela; Duration = 40 sec. Lesion #2: Pressure = 8 gabriela; Duration = 30 sec.

## 2021-07-17 RX ORDER — APIXABAN 5 MG/1
TABLET, FILM COATED ORAL
Qty: 60 TABLET | Refills: 0 | Status: SHIPPED | OUTPATIENT
Start: 2021-07-17 | End: 2021-07-23 | Stop reason: ALTCHOICE

## 2021-07-20 ENCOUNTER — TELEPHONE (OUTPATIENT)
Dept: CARDIOLOGY CLINIC | Age: 71
End: 2021-07-20

## 2021-07-20 NOTE — TELEPHONE ENCOUNTER
Please call the patient. They went to  their elliquis prescription and it went up $140 per month and they want to know if there is something else that can be prescribed instead.     Nelida Aponte

## 2021-07-21 NOTE — TELEPHONE ENCOUNTER
Returned call, spoke to [employer] on phi, he advised that pt has hit donut hole with her prescriptions. Her Eliquis is going to cost her 188.00 dollars monthly now instead of 47.00 dollars. Advised of assistance program for Eliquis or she can go on Coumadin-which will be the cheapest option. He will discuss with pt and call us back for decision. Advised to call us before pt runs out of Eliquis. Jamarcus verbalized understanding.

## 2021-07-21 NOTE — TELEPHONE ENCOUNTER
Message  Received: Today  Camila Cain RN sent to Mary Larose LPN; Pauline De Paz MD  Caller: Unspecified Brie Chaudhary,  3:08 PM)  Appointment made for Friday to start Warfarin. Thanks! Noted, thanks.

## 2021-07-21 NOTE — TELEPHONE ENCOUNTER
Dru Meckel returned call, verified on PHI, he advised pt would like to go ahead with Coumadin. Advised I would send message to  and our coumadin nurse, bart and she will reach out to him in regards to switching over and getting her in for an appt. Jamarcus verbalized understanding.

## 2021-07-23 ENCOUNTER — ANTI-COAG VISIT (OUTPATIENT)
Dept: CARDIOLOGY CLINIC | Age: 71
End: 2021-07-23
Payer: MEDICARE

## 2021-07-23 DIAGNOSIS — I48.0 PAF (PAROXYSMAL ATRIAL FIBRILLATION) (HCC): ICD-10-CM

## 2021-07-23 DIAGNOSIS — Z79.01 LONG TERM (CURRENT) USE OF ANTICOAGULANTS: Primary | ICD-10-CM

## 2021-07-23 LAB
INR BLD: 1.3 (ref 1–1.5)
PT POC: 15.3 SECONDS (ref 9.1–12)
VALID INTERNAL CONTROL?: YES

## 2021-07-23 PROCEDURE — 85610 PROTHROMBIN TIME: CPT | Performed by: INTERNAL MEDICINE

## 2021-07-23 RX ORDER — WARFARIN SODIUM 5 MG/1
5 TABLET ORAL DAILY
COMMUNITY
Start: 2021-07-24 | End: 2021-07-23 | Stop reason: SDUPTHER

## 2021-07-23 RX ORDER — WARFARIN SODIUM 5 MG/1
5 TABLET ORAL DAILY
Qty: 30 TABLET | Refills: 0 | Status: SHIPPED | OUTPATIENT
Start: 2021-07-24

## 2021-07-27 ENCOUNTER — TELEPHONE (OUTPATIENT)
Dept: CARDIOLOGY CLINIC | Age: 71
End: 2021-07-27

## 2021-07-27 NOTE — TELEPHONE ENCOUNTER
Pt is at dental office Leah with DR Anyi Lockett office called. They need to have clearance for the pt. The pt states she had cardiac stents put in 3 weeks ago. Please call Leah back at [phone].     Thank you,  [facility]

## 2021-07-27 NOTE — TELEPHONE ENCOUNTER
Returned call to Kev Obando and left message that pt has had recent cardiac stenting and she would not be cleared for stopping any blood thinners due to her recent cardiac stenting. She has an appt on 8/10/21 with  and can discuss her clearance at her upcoming appt with him. Advised to call me back if needed.

## 2021-07-30 ENCOUNTER — ANTI-COAG VISIT (OUTPATIENT)
Dept: CARDIOLOGY CLINIC | Age: 71
End: 2021-07-30
Payer: MEDICARE

## 2021-07-30 DIAGNOSIS — I48.0 PAF (PAROXYSMAL ATRIAL FIBRILLATION) (HCC): ICD-10-CM

## 2021-07-30 DIAGNOSIS — Z79.01 LONG TERM (CURRENT) USE OF ANTICOAGULANTS: Primary | ICD-10-CM

## 2021-07-30 LAB
INR BLD: 6.3 (ref 1–1.5)
PT POC: 75.1 SECONDS (ref 9.1–12)
VALID INTERNAL CONTROL?: YES

## 2021-07-30 PROCEDURE — 85610 PROTHROMBIN TIME: CPT | Performed by: INTERNAL MEDICINE

## 2021-08-06 ENCOUNTER — ANTI-COAG VISIT (OUTPATIENT)
Dept: CARDIOLOGY CLINIC | Age: 71
End: 2021-08-06
Payer: MEDICARE

## 2021-08-06 DIAGNOSIS — I48.0 PAF (PAROXYSMAL ATRIAL FIBRILLATION) (HCC): ICD-10-CM

## 2021-08-06 DIAGNOSIS — Z79.01 LONG TERM (CURRENT) USE OF ANTICOAGULANTS: Primary | ICD-10-CM

## 2021-08-06 LAB
INR BLD: 5.5 (ref 1–1.5)
PT POC: 65.5 SECONDS (ref 9.1–12)
VALID INTERNAL CONTROL?: YES

## 2021-08-06 PROCEDURE — 85610 PROTHROMBIN TIME: CPT | Performed by: INTERNAL MEDICINE

## 2021-08-06 RX ORDER — WARFARIN 1 MG/1
TABLET ORAL
Qty: 120 TABLET | Refills: 1 | Status: SHIPPED | OUTPATIENT
Start: 2021-08-06 | End: 2022-02-08

## 2021-08-06 NOTE — PROGRESS NOTES
A full discussion of the nature of anticoagulants has been carried out. A benefit risk analysis has been presented to the patient, so that they understand the justification for choosing anticoagulation at this time. The need for frequent and regular monitoring, precise dosage adjustment and compliance is stressed. Side effects of potential bleeding are discussed. The patient should avoid any OTC items containing aspirin, naproxen or ibuprofen, and should avoid great swings in general diet. Avoid alcohol consumption. Advised to notify the office if antibiotic or steroid therapy is initiated. Call if any signs of abnormal bleeding are present.   Next PT/INR test on Tuesday

## 2021-08-13 ENCOUNTER — ANTI-COAG VISIT (OUTPATIENT)
Dept: CARDIOLOGY CLINIC | Age: 71
End: 2021-08-13
Payer: MEDICARE

## 2021-08-13 DIAGNOSIS — Z79.01 LONG TERM (CURRENT) USE OF ANTICOAGULANTS: Primary | ICD-10-CM

## 2021-08-13 DIAGNOSIS — I48.0 PAF (PAROXYSMAL ATRIAL FIBRILLATION) (HCC): ICD-10-CM

## 2021-08-13 LAB
INR BLD: 2.4 (ref 1–1.5)
PT POC: 28.4 SECONDS (ref 9.1–12)
VALID INTERNAL CONTROL?: YES

## 2021-08-13 PROCEDURE — 85610 PROTHROMBIN TIME: CPT | Performed by: INTERNAL MEDICINE

## 2021-08-20 ENCOUNTER — ANTI-COAG VISIT (OUTPATIENT)
Dept: CARDIOLOGY CLINIC | Age: 71
End: 2021-08-20
Payer: MEDICARE

## 2021-08-20 DIAGNOSIS — I48.0 PAF (PAROXYSMAL ATRIAL FIBRILLATION) (HCC): ICD-10-CM

## 2021-08-20 DIAGNOSIS — Z79.01 LONG TERM (CURRENT) USE OF ANTICOAGULANTS: Primary | ICD-10-CM

## 2021-08-20 LAB
INR BLD: 1.9 (ref 1–1.5)
INR, EXTERNAL: 1.9 (ref 2–3)
PT POC: 23.2 SECONDS (ref 9.1–12)
VALID INTERNAL CONTROL?: YES

## 2021-08-20 PROCEDURE — 85610 PROTHROMBIN TIME: CPT | Performed by: INTERNAL MEDICINE

## 2021-08-27 ENCOUNTER — ANTI-COAG VISIT (OUTPATIENT)
Dept: CARDIOLOGY CLINIC | Age: 71
End: 2021-08-27
Payer: MEDICARE

## 2021-08-27 DIAGNOSIS — I48.0 PAF (PAROXYSMAL ATRIAL FIBRILLATION) (HCC): ICD-10-CM

## 2021-08-27 DIAGNOSIS — Z79.01 LONG TERM (CURRENT) USE OF ANTICOAGULANTS: Primary | ICD-10-CM

## 2021-08-27 LAB
INR BLD: 1.7 (ref 1–1.5)
PT POC: 20.1 SECONDS (ref 9.1–12)
VALID INTERNAL CONTROL?: YES

## 2021-08-27 PROCEDURE — 85610 PROTHROMBIN TIME: CPT | Performed by: INTERNAL MEDICINE

## 2021-09-03 ENCOUNTER — ANTI-COAG VISIT (OUTPATIENT)
Dept: CARDIOLOGY CLINIC | Age: 71
End: 2021-09-03
Payer: MEDICARE

## 2021-09-03 DIAGNOSIS — Z79.01 LONG TERM (CURRENT) USE OF ANTICOAGULANTS: Primary | ICD-10-CM

## 2021-09-03 DIAGNOSIS — I48.0 PAF (PAROXYSMAL ATRIAL FIBRILLATION) (HCC): ICD-10-CM

## 2021-09-03 LAB
INR BLD: 1.5 (ref 1–1.5)
PT POC: 18.5 SECONDS (ref 9.1–12)
VALID INTERNAL CONTROL?: YES

## 2021-09-03 PROCEDURE — 85610 PROTHROMBIN TIME: CPT | Performed by: INTERNAL MEDICINE

## 2021-09-07 ENCOUNTER — OFFICE VISIT (OUTPATIENT)
Dept: CARDIOLOGY CLINIC | Age: 71
End: 2021-09-07
Payer: MEDICARE

## 2021-09-07 VITALS
DIASTOLIC BLOOD PRESSURE: 80 MMHG | SYSTOLIC BLOOD PRESSURE: 150 MMHG | WEIGHT: 151 LBS | OXYGEN SATURATION: 98 % | HEIGHT: 67 IN | HEART RATE: 72 BPM | RESPIRATION RATE: 18 BRPM | BODY MASS INDEX: 23.7 KG/M2

## 2021-09-07 DIAGNOSIS — I73.9 PVD (PERIPHERAL VASCULAR DISEASE) (HCC): ICD-10-CM

## 2021-09-07 DIAGNOSIS — E78.2 MIXED HYPERLIPIDEMIA: ICD-10-CM

## 2021-09-07 DIAGNOSIS — I10 ESSENTIAL HYPERTENSION: Chronic | ICD-10-CM

## 2021-09-07 DIAGNOSIS — I25.10 ATHEROSCLEROSIS OF NATIVE CORONARY ARTERY OF NATIVE HEART WITHOUT ANGINA PECTORIS: ICD-10-CM

## 2021-09-07 DIAGNOSIS — I48.0 PAF (PAROXYSMAL ATRIAL FIBRILLATION) (HCC): Primary | ICD-10-CM

## 2021-09-07 DIAGNOSIS — Z98.61 S/P PTCA (PERCUTANEOUS TRANSLUMINAL CORONARY ANGIOPLASTY): Chronic | ICD-10-CM

## 2021-09-07 PROCEDURE — G8754 DIAS BP LESS 90: HCPCS | Performed by: INTERNAL MEDICINE

## 2021-09-07 PROCEDURE — G8399 PT W/DXA RESULTS DOCUMENT: HCPCS | Performed by: INTERNAL MEDICINE

## 2021-09-07 PROCEDURE — G8753 SYS BP > OR = 140: HCPCS | Performed by: INTERNAL MEDICINE

## 2021-09-07 PROCEDURE — 3288F FALL RISK ASSESSMENT DOCD: CPT | Performed by: INTERNAL MEDICINE

## 2021-09-07 PROCEDURE — 3017F COLORECTAL CA SCREEN DOC REV: CPT | Performed by: INTERNAL MEDICINE

## 2021-09-07 PROCEDURE — 99214 OFFICE O/P EST MOD 30 MIN: CPT | Performed by: INTERNAL MEDICINE

## 2021-09-07 PROCEDURE — 1090F PRES/ABSN URINE INCON ASSESS: CPT | Performed by: INTERNAL MEDICINE

## 2021-09-07 PROCEDURE — 1100F PTFALLS ASSESS-DOCD GE2>/YR: CPT | Performed by: INTERNAL MEDICINE

## 2021-09-07 PROCEDURE — G8427 DOCREV CUR MEDS BY ELIG CLIN: HCPCS | Performed by: INTERNAL MEDICINE

## 2021-09-07 PROCEDURE — G8510 SCR DEP NEG, NO PLAN REQD: HCPCS | Performed by: INTERNAL MEDICINE

## 2021-09-07 PROCEDURE — G8536 NO DOC ELDER MAL SCRN: HCPCS | Performed by: INTERNAL MEDICINE

## 2021-09-07 PROCEDURE — 93000 ELECTROCARDIOGRAM COMPLETE: CPT | Performed by: INTERNAL MEDICINE

## 2021-09-07 PROCEDURE — G8420 CALC BMI NORM PARAMETERS: HCPCS | Performed by: INTERNAL MEDICINE

## 2021-09-07 NOTE — PROGRESS NOTES
1. Have you been to the ER, urgent care clinic since your last visit? Hospitalized since your last visit? No    2. Have you seen or consulted any other health care providers outside of the [address] [place] [address] since your last visit? Include any pap smears or colon screening. No      Chief Complaint   Patient presents with    Follow-up     4-6 wk appt after she had stents put in. Denies cardiac symtpoms.

## 2021-09-07 NOTE — PROGRESS NOTES
9/7/2021 11:46 AM      Subjective:     Lynette Crocker is seen in office today for f/u visit after recent PCI and left SFA intervention. No symptoms now. She denies chest pain, chest pressure/discomfort, dyspnea, palpitations, irregular heart beats, near-syncope, syncope, orthopnea, paroxysmal nocturnal dyspnea, exertional chest pressure/discomfort, claudication, lower extremity edema. Visit Vitals  BP (!) 150/80 (BP 1 Location: Left upper arm, BP Patient Position: Sitting, BP Cuff Size: Large adult)   Pulse 72   Resp 18   Ht 5' 7\" (1.702 m)   Wt 151 lb (68.5 kg)   SpO2 98%   BMI 23.65 kg/m²     Current Outpatient Medications   Medication Sig    warfarin (COUMADIN) 1 mg tablet Take Coumadin  2 tablets Monday, Wednesday, Friday and 1 tablet all other days  Indications: treatment to prevent blood clots in chronic atrial fibrillation (Patient taking differently: Take 1 mg by mouth daily. Take 2 tablets daily  Indications: treatment to prevent blood clots in chronic atrial fibrillation)    lisinopriL (PRINIVIL, ZESTRIL) 10 mg tablet Take 20 mg by mouth daily.  clopidogreL (Plavix) 75 mg tab Take 1 Tablet by mouth daily.  simvastatin (ZOCOR) 40 mg tablet Take 40 mg by mouth daily.  gabapentin (NEURONTIN) 300 mg capsule Take 300 mg by mouth three (3) times daily.  insulin lispro (HUMALOG U-100 INSULIN) 100 unit/mL injection by SubCUTAneous route. INSULIN PUMP    nitroglycerin (NITROSTAT) 0.4 mg SL tablet 1 Tab by SubLINGual route every five (5) minutes as needed for Chest Pain.  calcium 500 mg tab Take 1 Tablet by mouth daily.  cholecalciferol, vitamin D3, (VITAMIN D3) 4,000 unit cap Take  by mouth daily.  CINNAMON BARK (CINNAMON PO) Take 2,000 mg by mouth daily.  warfarin (COUMADIN) 5 mg tablet Take 1 Tablet by mouth daily. (Patient not taking: Reported on 9/7/2021)    levothyroxine (SYNTHROID) 50 mcg tablet Take 50 mcg by mouth daily (before breakfast).  (Patient not taking: Reported on 9/7/2021)     No current facility-administered medications for this visit. Objective:      Visit Vitals  BP (!) 150/80 (BP 1 Location: Left upper arm, BP Patient Position: Sitting, BP Cuff Size: Large adult)   Pulse 72   Resp 18   Ht 5' 7\" (1.702 m)   Wt 151 lb (68.5 kg)   SpO2 98%   BMI 23.65 kg/m²       Past Medical History:   Diagnosis Date    Arthritis     BILAT HANDS    CAD (coronary artery disease)     high cholesterol; heart murmur    Cancer (HCC)     uterine    Chronic kidney disease     Diabetes ([address]     Endocrine disease     hypothyroidism    Hyperlipidemia     Hypertension     Long term current use of anticoagulant therapy     Murmur     Neurological disorder     TIA    Other ill-defined conditions(799.89)     PVD; benign right breast tumor removed    Peripheral vascular angioplasty status 8/14/2013 8/14/13 s/p angioplasty L RFA    Stroke ([facility]     TIA  X3 NO RESIDUAL    Thyroid disease     HYPOTHYROID      Past Surgical History:   Procedure Laterality Date    HX ANKLE FRACTURE TX      pins and plates right ankle    HX BREAST BIOPSY Right     negative  1998    HX CORONARY STENT PLACEMENT      HX GYN      hysterectomy    HX HEART CATHETERIZATION      HX OTHER SURGICAL      fem pop bypass on left    HX PTCA      MN BREAST SURGERY PROCEDURE UNLISTED      tumor removed    MN CARDIAC SURG PROCEDURE UNLIST      CARDIAC STENT  X2     Allergies   Allergen Reactions    Pcn [Penicillins] Anaphylaxis    Codeine Palpitations    Ivp Dye [Fd And C Blue No.1] Nausea and Vomiting     \"I was told in [facility] that it would stop my kidney up if I got it again. \"      Family History   Problem Relation Age of Onset   Pedro Carmona Cancer Mother     Hypertension Sister     Hypertension Sister     Heart Disease Brother 72    Stroke Sister         x2 sisters    Diabetes Father     Cancer Father       Social History     Socioeconomic History    Marital status:      Spouse name: Neo Harmon    Number of children: 5    Years of education: Not on file    Highest education level: Not on file   Occupational History     Employer: NOT EMPLOYED   Tobacco Use    Smoking status: Former Smoker     Packs/day: 5.00     Years: 30.00     Pack years: 150.00     Quit date: 2000     Years since quittin.0    Smokeless tobacco: Never Used   Substance and Sexual Activity    Alcohol use: No     Alcohol/week: 0.0 standard drinks    Drug use: No    Sexual activity: Not on file   Other Topics Concern     Service Not Asked    Blood Transfusions Not Asked    Caffeine Concern Not Asked    Occupational Exposure Not Asked    Hobby Hazards Not Asked    Sleep Concern Not Asked    Stress Concern Not Asked    Weight Concern Not Asked    Special Diet Not Asked    Back Care Not Asked    Exercise Not Asked    Bike Helmet Not Asked    [address] Not Asked    Self-Exams Not Asked   Social History Narrative    Not on file     Social Determinants of Health     Financial Resource Strain:     Difficulty of Paying Living Expenses:    Food Insecurity:     Worried About Running Out of Food in the Last Year:     [address] in the Last Year:    Transportation Needs:     Lack of Transportation (Medical):  Lack of Transportation (Non-Medical):    Physical Activity:     Days of Exercise per Week:     Minutes of Exercise per Session:    Stress:     Feeling of Stress :    Social Connections:     Frequency of Communication with Friends and Family:     Frequency of Social Gatherings with Friends and Family:     Attends [employer] Services:     Active Member of Clubs or Organizations:     Attends Club or Organization Meetings:     Marital Status:    Intimate Partner Violence:     Fear of Current or Ex-Partner:     Emotionally Abused:     Physically Abused:     Sexually Abused:          Assessment:       ICD-10-CM ICD-9-CM    1.  PAF (paroxysmal atrial fibrillation) ([facility] I48.0 427.31 AMB POC EKG ROUTINE W/ 12 LEADS, INTER & REP   2. S/P PTCA (percutaneous transluminal coronary angioplasty)  Z98.61 V45.82    3. Mixed hyperlipidemia  E78.2 272.2    4. PVD (peripheral vascular disease) ([facility])  I73.9 443.9    5. Atherosclerosis of native coronary artery of native heart without angina pectoris  I25.10 414.01    6. Essential hypertension  I10 401.9        Plan:     Atherosclerosis of native coronary artery of native heart without angina pectoris  Hx of ARCHANA to LCx and RCA . S/p LCX ARCHANA PCI 6/2021. Intolerant to BB therapy. Nitrates & CCB cause orthostatic hypotension. Can proceed with dental procedure.      PAF  Stable. Continue Eliquis 5 mg BID      Essential hypertension  Controlled with current therapy     Mixed hyperlipidemia  On statin. FLP done by endo.      PAD (peripheral artery disease) ([address]  S/p left SFA stent 7/2021, Rt SFA 70% lesion asymptomatic. Manage medically.      S/p right SFA atherectomy and DCB PTA 10/2017.    S/p b/l iliac stents 7/2014.    s/p left SFA laser athetectomy and stent 9/2011  s/p left SFA angioplasty for ISR 1/2012.   s/p left SFA redo laser atherectomy and PTA 10/2012  S/p left SFA angioplasty 8/2013

## 2021-09-17 ENCOUNTER — ANTI-COAG VISIT (OUTPATIENT)
Dept: CARDIOLOGY CLINIC | Age: 71
End: 2021-09-17
Payer: MEDICARE

## 2021-09-17 DIAGNOSIS — Z79.01 LONG TERM (CURRENT) USE OF ANTICOAGULANTS: Primary | ICD-10-CM

## 2021-09-17 DIAGNOSIS — I48.0 PAF (PAROXYSMAL ATRIAL FIBRILLATION) (HCC): ICD-10-CM

## 2021-09-17 LAB
INR BLD: 1.6 (ref 1–1.5)
PT POC: 19.8 SECONDS (ref 9.1–12)
VALID INTERNAL CONTROL?: YES

## 2021-09-17 PROCEDURE — 85610 PROTHROMBIN TIME: CPT | Performed by: INTERNAL MEDICINE

## 2021-09-24 ENCOUNTER — ANTI-COAG VISIT (OUTPATIENT)
Dept: CARDIOLOGY CLINIC | Age: 71
End: 2021-09-24
Payer: MEDICARE

## 2021-09-24 DIAGNOSIS — Z79.01 LONG TERM (CURRENT) USE OF ANTICOAGULANTS: Primary | ICD-10-CM

## 2021-09-24 DIAGNOSIS — I48.0 PAF (PAROXYSMAL ATRIAL FIBRILLATION) (HCC): ICD-10-CM

## 2021-09-24 LAB
INR BLD: 2.7 (ref 1–1.5)
PT POC: 32.9 SECONDS (ref 9.1–12)
VALID INTERNAL CONTROL?: YES

## 2021-09-24 PROCEDURE — 85610 PROTHROMBIN TIME: CPT | Performed by: INTERNAL MEDICINE

## 2021-10-01 ENCOUNTER — ANTI-COAG VISIT (OUTPATIENT)
Dept: CARDIOLOGY CLINIC | Age: 71
End: 2021-10-01
Payer: MEDICARE

## 2021-10-01 DIAGNOSIS — Z79.01 LONG TERM (CURRENT) USE OF ANTICOAGULANTS: Primary | ICD-10-CM

## 2021-10-01 DIAGNOSIS — I48.0 PAF (PAROXYSMAL ATRIAL FIBRILLATION) (HCC): ICD-10-CM

## 2021-10-01 LAB
INR BLD: 3.5 (ref 1–1.5)
PT POC: 41.7 SECONDS (ref 9.1–12)
VALID INTERNAL CONTROL?: YES

## 2021-10-01 PROCEDURE — 85610 PROTHROMBIN TIME: CPT | Performed by: INTERNAL MEDICINE

## 2021-10-11 ENCOUNTER — TELEPHONE (OUTPATIENT)
Dept: CARDIOLOGY CLINIC | Age: 71
End: 2021-10-11

## 2021-10-11 RX ORDER — CLOPIDOGREL BISULFATE 75 MG/1
75 TABLET ORAL DAILY
Qty: 90 TABLET | Refills: 1 | Status: SHIPPED | OUTPATIENT
Start: 2021-10-11 | End: 2022-04-09

## 2021-10-11 NOTE — TELEPHONE ENCOUNTER
Returned call and left message to call me back. Plavix was sent to her mail order with refills on 7/9/21. Does she use this pharmacy?

## 2021-10-11 NOTE — TELEPHONE ENCOUNTER
Patient has changed pharmacies.  Please switch [facility]rel [facility] [address] in [place]  Please call patient @[phone]    Thank you,  Magan Bacon

## 2021-10-11 NOTE — TELEPHONE ENCOUNTER
Fabio Mccauley  on [address] returned call, he advised pt is in [facility] and cannot use the [facility] mail order pharmacy until after the first of the year. He will need a 90 day sent to local [facility]. Advised I just wanted to make sure, I will send to  and he will send to pharmacy. Jamarcus verbalized understanding. Sent 90 day refill for plavix to  for approval and to send to pharmacy.

## 2021-10-12 NOTE — ED PROVIDER NOTES
EMERGENCY DEPARTMENT HISTORY AND PHYSICAL EXAM      Date: 5/8/2021  Patient Name: Mar Moreno    History of Presenting Illness     Chief Complaint   Patient presents with    Fatigue     pt has an insulin pump. over the past 2 days family has noted that it has dispensed as much as 160 units, which is not normal for her. Her blood glucose this morning when family woke up was 47. They gave her candy and it came up. When BGL was near 74, pt started to feel bad and vomited. BGL is now 184, per pt's pump    Vomiting         HPI: Mar Moreno, 72 y.o. female with recent stimulator placed to her spine to control incontinence, poorly controlled diabetic, dementia presenting to ED with chief complaint of generally not feeling well. Described as weakness and nausea for couple days, several episodes of nonbloody nonbilious vomiting today. States that her insulin pump has been acting abnormally, giving her significant boluses of insulin despite normal blood sugar readings. Has ongoing pain to her back but it is not specifically worse. Denies drainage from wound site. Denies abdominal pain. There are no other complaints, changes, or physical findings at this time. PCP: Chuyita Ellison MD    No current facility-administered medications on file prior to encounter. Current Outpatient Medications on File Prior to Encounter   Medication Sig Dispense Refill    Eliquis 5 mg tablet Take 1 tablet by mouth twice daily 60 Tab 0    aspirin 81 mg chewable tablet Take 81 mg by mouth daily.  ezetimibe (Zetia) 10 mg tablet Take 1 Tab by mouth daily. 30 Tab 5    clopidogreL (PLAVIX) 75 mg tab TAKE 1 TABLET BY MOUTH ONCE DAILY 90 Tab 1    trospium (SANCTURA) 20 mg tablet Take 60 mg by mouth two (2) times a day.  alendronate (FOSAMAX) 10 mg tablet Take 10 mg by mouth daily.  simvastatin (ZOCOR) 40 mg tablet Take 40 mg by mouth daily.       gabapentin (NEURONTIN) 300 mg capsule Take 300 mg by mouth three (3) times daily.  insulin lispro (HUMALOG U-100 INSULIN) 100 unit/mL injection by SubCUTAneous route. INSULIN PUMP      nitroglycerin (NITROSTAT) 0.4 mg SL tablet 1 Tab by SubLINGual route every five (5) minutes as needed for Chest Pain. 25 Tab 1    calcium 500 mg tab Take 1 Tab by mouth daily.  cholecalciferol, vitamin D3, (VITAMIN D3) 4,000 unit cap Take  by mouth daily.  levothyroxine (SYNTHROID) 50 mcg tablet Take 50 mcg by mouth daily (before breakfast).  CINNAMON BARK (CINNAMON PO) Take 2,000 mg by mouth daily.          Past History     Past Medical History:  Past Medical History:   Diagnosis Date    Arthritis     BILAT HANDS    CAD (coronary artery disease)     high cholesterol; heart murmur    Cancer (HCC)     uterine    Chronic kidney disease     Diabetes ([facility]     Endocrine disease     hypothyroidism    Hypertension     Neurological disorder     TIA    Other ill-defined conditions(799.89)     PVD; benign right breast tumor removed    Peripheral vascular angioplasty status 8/14/2013 8/14/13 s/p angioplasty L RFA    Stroke ([facility]     TIA  X3 NO RESIDUAL    Thyroid disease     HYPOTHYROID       Past Surgical History:  Past Surgical History:   Procedure Laterality Date    HX ANKLE FRACTURE TX      pins and plates right ankle    HX BREAST BIOPSY Right     negative  1998    HX GYN      hysterectomy    HX OTHER SURGICAL      fem pop bypass on left    MO BREAST SURGERY PROCEDURE UNLISTED      tumor removed    MO CARDIAC SURG PROCEDURE UNLIST      CARDIAC STENT  X2       Family History:  Family History   Problem Relation Age of Onset    Cancer Mother     Hypertension Sister     Hypertension Sister     Heart Disease Brother 72    Stroke Sister         x2 sisters    Diabetes Father     Cancer Father        Social History:  Social History     Tobacco Use    Smoking status: Former Smoker     Packs/day: 5.00     Years: 30.00     Pack years: 150.00     Quit date: 9/1/2000 Years since quittin.6    Smokeless tobacco: Never Used   Substance Use Topics    Alcohol use: No     Alcohol/week: 0.0 standard drinks    Drug use: No       Allergies: Allergies   Allergen Reactions    Pcn [Penicillins] Anaphylaxis    Codeine Palpitations    Ivp Dye [Fd And C Blue No.1] Nausea and Vomiting     \"I was told in [facility] that it would stop my kidney up if I got it again. \"         Review of Systems   Review of Systems   Unable to perform ROS: Dementia       Physical Exam   Physical Exam  Vitals signs and nursing note reviewed. Constitutional:       Appearance: Normal appearance. HENT:      Head: Normocephalic and atraumatic. Mouth/Throat:      Mouth: Mucous membranes are moist.   Eyes:      Extraocular Movements: Extraocular movements intact. Neck:      Musculoskeletal: Neck supple. Cardiovascular:      Rate and Rhythm: Normal rate and regular rhythm. Pulses: Normal pulses. Pulmonary:      Effort: Pulmonary effort is normal.      Breath sounds: Normal breath sounds. Abdominal:      Palpations: Abdomen is soft. Tenderness: There is no abdominal tenderness. Musculoskeletal:         General: No deformity. Comments: Dressings covering L spine area are CDI since surgery   Skin:     General: Skin is warm and dry. Neurological:      General: No focal deficit present. Mental Status: She is alert. Psychiatric:         Mood and Affect: Mood normal.         Behavior: Behavior normal.         Diagnostic Study Results     Labs -         Radiologic Studies -   XR CHEST PORT   Final Result   No acute findings. CT Results  (Last 48 hours)    None        CXR Results  (Last 48 hours)               21 1436  XR CHEST PORT Final result    Impression:  No acute findings. Narrative:  EXAM: XR CHEST PORT       INDICATION: weakness, vomiting       COMPARISON: 2019       FINDINGS: A portable AP radiograph of the chest was obtained at 1416 hours. There is no pneumothorax or pleural effusion. The lungs are clear. Cardiac,   mediastinal and hilar contours are normal.  Atherosclerotic calcification of the   thoracic aorta is again shown. Moderate diffuse osteopenia is again   demonstrated. Medical Decision Making   I am the first provider for this patient. I reviewed the vital signs, available nursing notes, past medical history, past surgical history, family history and social history. Vital Signs-Reviewed the patient's vital signs. Patient Vitals for the past 24 hrs:   Temp Pulse Resp BP SpO2   05/08/21 1715    (!) 171/80 (!) 87 %   05/08/21 1545    (!) 155/81 90 %   05/08/21 1530    (!) 171/85 91 %   05/08/21 1500    (!) 146/51 90 %   05/08/21 1445    (!) 137/49 94 %   05/08/21 1415    (!) 140/58 93 %   05/08/21 1400    (!) 145/56 93 %   05/08/21 1342 98 °F (36.7 °C) 76 16 (!) 140/96 97 %         Provider Notes (Medical Decision Making):   61-year-old lady presenting to ED with weakness, vomiting, never seen to vomit in the ED. Had recent surgery, no signs of wound infection at this time. Labs are reassuring with normal white blood cell count, normal lactic, afebrile, does not sound like sepsis. Blood glucose within normal limits. EKG with no ischemia, troponin negative, does not sound like ACS. Per evaluation of her insulin pump, looks like she gave her self several boluses of insulin last night and this is likely the cause of her low blood sugar this morning. This was discussed with her and she would be more cautious about her insulin injections. I think she safe for discharge at this time with plan to follow-up with her physician for further evaluation. Return precautions given. EKG sinus, rate 70, nonspecific ST T changes, normal QT  ED Course:     Initial assessment performed.  The patients presenting problems have been discussed, and they are in agreement with the care plan formulated and outlined with them. I have encouraged them to ask questions as they arise throughout their visit. Disposition:  dc    PLAN:  1. Current Discharge Medication List        2.    Follow-up Information    None       Return to ED if worse     Diagnosis     Clinical Impression: hypoglycemia, vomiting - continue warfarin, Daily PT/INR

## 2021-10-13 ENCOUNTER — ANTI-COAG VISIT (OUTPATIENT)
Dept: CARDIOLOGY CLINIC | Age: 71
End: 2021-10-13
Payer: MEDICARE

## 2021-10-13 DIAGNOSIS — I48.0 PAF (PAROXYSMAL ATRIAL FIBRILLATION) (HCC): ICD-10-CM

## 2021-10-13 DIAGNOSIS — Z79.01 LONG TERM (CURRENT) USE OF ANTICOAGULANTS: Primary | ICD-10-CM

## 2021-10-13 LAB
INR BLD: 2.9 (ref 1–1.5)
PT POC: 34.8 SECONDS (ref 9.1–12)
VALID INTERNAL CONTROL?: YES

## 2021-10-13 PROCEDURE — 85610 PROTHROMBIN TIME: CPT | Performed by: INTERNAL MEDICINE

## 2021-10-13 RX ORDER — WARFARIN 2.5 MG/1
TABLET ORAL
Qty: 90 TABLET | Refills: 0 | Status: SHIPPED | OUTPATIENT
Start: 2021-10-13

## 2021-10-13 RX ORDER — WARFARIN 2.5 MG/1
2.5 TABLET ORAL DAILY
COMMUNITY
End: 2021-10-13 | Stop reason: SDUPTHER

## 2021-11-03 ENCOUNTER — ANTI-COAG VISIT (OUTPATIENT)
Dept: CARDIOLOGY CLINIC | Age: 71
End: 2021-11-03
Payer: MEDICARE

## 2021-11-03 DIAGNOSIS — Z79.01 LONG TERM (CURRENT) USE OF ANTICOAGULANTS: Primary | ICD-10-CM

## 2021-11-03 DIAGNOSIS — I48.0 PAF (PAROXYSMAL ATRIAL FIBRILLATION) (HCC): ICD-10-CM

## 2021-11-03 LAB
INR BLD: 3.8 (ref 1–1.5)
PT POC: 46.2 SECONDS (ref 9.1–12)
VALID INTERNAL CONTROL?: YES

## 2021-11-03 PROCEDURE — 85610 PROTHROMBIN TIME: CPT | Performed by: INTERNAL MEDICINE

## 2021-11-11 ENCOUNTER — TELEPHONE (OUTPATIENT)
Dept: CARDIOLOGY CLINIC | Age: 71
End: 2021-11-11

## 2021-11-11 NOTE — TELEPHONE ENCOUNTER
Pt's  called stating his wife had a bad nose bleed this morning. He states it is better now but has not completely stopped. Advised holding pressure and applying a cold compress. Advised them to go to the ER if bleeding does not stop. Offered to check pt's INR this afternoon, they requested to come tomorrow am. INR visit rescheduled from next Wednesday to tomorrow.

## 2021-11-12 ENCOUNTER — ANTI-COAG VISIT (OUTPATIENT)
Dept: CARDIOLOGY CLINIC | Age: 71
End: 2021-11-12
Payer: MEDICARE

## 2021-11-12 DIAGNOSIS — I48.0 PAF (PAROXYSMAL ATRIAL FIBRILLATION) (HCC): ICD-10-CM

## 2021-11-12 DIAGNOSIS — Z79.01 LONG TERM (CURRENT) USE OF ANTICOAGULANTS: Primary | ICD-10-CM

## 2021-11-12 LAB
INR BLD: 3.4 (ref 1–1.5)
PT POC: 40.8 SECONDS (ref 9.1–12)
VALID INTERNAL CONTROL?: YES

## 2021-11-12 PROCEDURE — 85610 PROTHROMBIN TIME: CPT | Performed by: INTERNAL MEDICINE

## 2021-11-19 ENCOUNTER — ANTI-COAG VISIT (OUTPATIENT)
Dept: CARDIOLOGY CLINIC | Age: 71
End: 2021-11-19
Payer: MEDICARE

## 2021-11-19 DIAGNOSIS — Z79.01 LONG TERM (CURRENT) USE OF ANTICOAGULANTS: Primary | ICD-10-CM

## 2021-11-19 DIAGNOSIS — I48.0 PAF (PAROXYSMAL ATRIAL FIBRILLATION) (HCC): ICD-10-CM

## 2021-11-19 LAB
INR BLD: 2.5 (ref 1–1.5)
PT POC: 29.9 SECONDS (ref 9.1–12)
VALID INTERNAL CONTROL?: YES

## 2021-11-19 PROCEDURE — 85610 PROTHROMBIN TIME: CPT | Performed by: INTERNAL MEDICINE

## 2021-12-03 ENCOUNTER — ANTI-COAG VISIT (OUTPATIENT)
Dept: CARDIOLOGY CLINIC | Age: 71
End: 2021-12-03
Payer: MEDICARE

## 2021-12-03 DIAGNOSIS — I48.0 PAF (PAROXYSMAL ATRIAL FIBRILLATION) (HCC): ICD-10-CM

## 2021-12-03 DIAGNOSIS — Z79.01 LONG TERM (CURRENT) USE OF ANTICOAGULANTS: Primary | ICD-10-CM

## 2021-12-03 LAB
INR BLD: 3 (ref 1–1.5)
PT POC: 36.1 SECONDS (ref 9.1–12)
VALID INTERNAL CONTROL?: YES

## 2021-12-03 PROCEDURE — 85610 PROTHROMBIN TIME: CPT | Performed by: INTERNAL MEDICINE

## 2021-12-17 ENCOUNTER — ANTI-COAG VISIT (OUTPATIENT)
Dept: CARDIOLOGY CLINIC | Age: 71
End: 2021-12-17
Payer: MEDICARE

## 2021-12-17 DIAGNOSIS — I48.0 PAF (PAROXYSMAL ATRIAL FIBRILLATION) (HCC): ICD-10-CM

## 2021-12-17 DIAGNOSIS — Z79.01 LONG TERM (CURRENT) USE OF ANTICOAGULANTS: Primary | ICD-10-CM

## 2021-12-17 LAB
INR BLD: 3.4 (ref 1–1.5)
PT POC: 40.4 SECONDS (ref 9.1–12)
VALID INTERNAL CONTROL?: YES

## 2021-12-17 PROCEDURE — 85610 PROTHROMBIN TIME: CPT | Performed by: INTERNAL MEDICINE

## 2021-12-29 ENCOUNTER — ANTI-COAG VISIT (OUTPATIENT)
Dept: CARDIOLOGY CLINIC | Age: 71
End: 2021-12-29
Payer: MEDICARE

## 2021-12-29 DIAGNOSIS — Z79.01 LONG TERM (CURRENT) USE OF ANTICOAGULANTS: Primary | ICD-10-CM

## 2021-12-29 DIAGNOSIS — I48.0 PAF (PAROXYSMAL ATRIAL FIBRILLATION) (HCC): ICD-10-CM

## 2021-12-29 LAB
INR BLD: 2.1 (ref 1–1.5)
PT POC: 25.8 SECONDS (ref 9.1–12)
VALID INTERNAL CONTROL?: YES

## 2021-12-29 PROCEDURE — 85610 PROTHROMBIN TIME: CPT | Performed by: INTERNAL MEDICINE

## 2022-01-19 ENCOUNTER — ANTI-COAG VISIT (OUTPATIENT)
Dept: CARDIOLOGY CLINIC | Age: 72
End: 2022-01-19
Payer: MEDICARE

## 2022-01-19 DIAGNOSIS — I48.0 PAF (PAROXYSMAL ATRIAL FIBRILLATION) (HCC): ICD-10-CM

## 2022-01-19 DIAGNOSIS — Z79.01 LONG TERM (CURRENT) USE OF ANTICOAGULANTS: Primary | ICD-10-CM

## 2022-01-19 LAB
INR BLD: 4.9 (ref 1–1.5)
PT POC: 58.6 SECONDS (ref 9.1–12)
VALID INTERNAL CONTROL?: YES

## 2022-01-19 PROCEDURE — 85610 PROTHROMBIN TIME: CPT | Performed by: INTERNAL MEDICINE

## 2022-01-26 ENCOUNTER — ANTI-COAG VISIT (OUTPATIENT)
Dept: CARDIOLOGY CLINIC | Age: 72
End: 2022-01-26
Payer: MEDICARE

## 2022-01-26 DIAGNOSIS — I48.0 PAF (PAROXYSMAL ATRIAL FIBRILLATION) (HCC): ICD-10-CM

## 2022-01-26 DIAGNOSIS — Z79.01 LONG TERM (CURRENT) USE OF ANTICOAGULANTS: Primary | ICD-10-CM

## 2022-01-26 LAB
INR BLD: 2.1 (ref 1–1.5)
PT POC: 24.8 SECONDS (ref 9.1–12)
VALID INTERNAL CONTROL?: YES

## 2022-01-26 PROCEDURE — 85610 PROTHROMBIN TIME: CPT | Performed by: INTERNAL MEDICINE

## 2022-02-08 RX ORDER — WARFARIN 1 MG/1
TABLET ORAL
Qty: 120 TABLET | Refills: 0 | Status: SHIPPED | OUTPATIENT
Start: 2022-02-08 | End: 2022-03-15 | Stop reason: SDUPTHER

## 2022-02-09 ENCOUNTER — ANTI-COAG VISIT (OUTPATIENT)
Dept: CARDIOLOGY CLINIC | Age: 72
End: 2022-02-09
Payer: MEDICARE

## 2022-02-09 DIAGNOSIS — Z79.01 LONG TERM (CURRENT) USE OF ANTICOAGULANTS: Primary | ICD-10-CM

## 2022-02-09 DIAGNOSIS — I48.0 PAF (PAROXYSMAL ATRIAL FIBRILLATION) (HCC): ICD-10-CM

## 2022-02-09 LAB
INR BLD: 2.5 (ref 1–1.5)
PT POC: 30.4 SECONDS (ref 9.1–12)
VALID INTERNAL CONTROL?: YES

## 2022-02-09 PROCEDURE — 85610 PROTHROMBIN TIME: CPT | Performed by: INTERNAL MEDICINE

## 2022-03-15 ENCOUNTER — OFFICE VISIT (OUTPATIENT)
Dept: CARDIOLOGY CLINIC | Age: 72
End: 2022-03-15

## 2022-03-15 ENCOUNTER — ANTI-COAG VISIT (OUTPATIENT)
Dept: CARDIOLOGY CLINIC | Age: 72
End: 2022-03-15
Payer: MEDICARE

## 2022-03-15 VITALS
DIASTOLIC BLOOD PRESSURE: 70 MMHG | OXYGEN SATURATION: 95 % | HEART RATE: 79 BPM | BODY MASS INDEX: 23.12 KG/M2 | WEIGHT: 147.3 LBS | RESPIRATION RATE: 16 BRPM | SYSTOLIC BLOOD PRESSURE: 138 MMHG | HEIGHT: 67 IN

## 2022-03-15 DIAGNOSIS — E78.2 MIXED HYPERLIPIDEMIA: ICD-10-CM

## 2022-03-15 DIAGNOSIS — I48.0 PAF (PAROXYSMAL ATRIAL FIBRILLATION) (HCC): ICD-10-CM

## 2022-03-15 DIAGNOSIS — Z79.01 LONG TERM (CURRENT) USE OF ANTICOAGULANTS: Primary | ICD-10-CM

## 2022-03-15 DIAGNOSIS — Z98.61 S/P PTCA (PERCUTANEOUS TRANSLUMINAL CORONARY ANGIOPLASTY): Chronic | ICD-10-CM

## 2022-03-15 DIAGNOSIS — I25.10 ATHEROSCLEROSIS OF NATIVE CORONARY ARTERY OF NATIVE HEART WITHOUT ANGINA PECTORIS: Primary | ICD-10-CM

## 2022-03-15 DIAGNOSIS — I10 PRIMARY HYPERTENSION: Chronic | ICD-10-CM

## 2022-03-15 DIAGNOSIS — I73.9 PVD (PERIPHERAL VASCULAR DISEASE) (HCC): ICD-10-CM

## 2022-03-15 LAB
INR BLD: 3.4 (ref 1–1.5)
PT POC: 41.1 SECONDS (ref 9.1–12)
VALID INTERNAL CONTROL?: YES

## 2022-03-15 PROCEDURE — G8536 NO DOC ELDER MAL SCRN: HCPCS | Performed by: INTERNAL MEDICINE

## 2022-03-15 PROCEDURE — G8427 DOCREV CUR MEDS BY ELIG CLIN: HCPCS | Performed by: INTERNAL MEDICINE

## 2022-03-15 PROCEDURE — 1101F PT FALLS ASSESS-DOCD LE1/YR: CPT | Performed by: INTERNAL MEDICINE

## 2022-03-15 PROCEDURE — G8420 CALC BMI NORM PARAMETERS: HCPCS | Performed by: INTERNAL MEDICINE

## 2022-03-15 PROCEDURE — G8399 PT W/DXA RESULTS DOCUMENT: HCPCS | Performed by: INTERNAL MEDICINE

## 2022-03-15 PROCEDURE — 99214 OFFICE O/P EST MOD 30 MIN: CPT | Performed by: INTERNAL MEDICINE

## 2022-03-15 PROCEDURE — G8432 DEP SCR NOT DOC, RNG: HCPCS | Performed by: INTERNAL MEDICINE

## 2022-03-15 PROCEDURE — 3017F COLORECTAL CA SCREEN DOC REV: CPT | Performed by: INTERNAL MEDICINE

## 2022-03-15 PROCEDURE — 85610 PROTHROMBIN TIME: CPT | Performed by: INTERNAL MEDICINE

## 2022-03-15 PROCEDURE — G8752 SYS BP LESS 140: HCPCS | Performed by: INTERNAL MEDICINE

## 2022-03-15 PROCEDURE — 1090F PRES/ABSN URINE INCON ASSESS: CPT | Performed by: INTERNAL MEDICINE

## 2022-03-15 PROCEDURE — G8754 DIAS BP LESS 90: HCPCS | Performed by: INTERNAL MEDICINE

## 2022-03-15 RX ORDER — LISINOPRIL 20 MG/1
20 TABLET ORAL DAILY
COMMUNITY
Start: 2022-01-31

## 2022-03-15 RX ORDER — WARFARIN 1 MG/1
TABLET ORAL
Qty: 180 TABLET | Refills: 0 | Status: SHIPPED | OUTPATIENT
Start: 2022-03-15

## 2022-03-15 NOTE — PROGRESS NOTES
Chief Complaint   Patient presents with    Circulatory problem     6m f/u       1. Have you been to the ER, urgent care clinic since your last visit? Hospitalized since your last visit? No    2. Have you seen or consulted any other health care providers outside of the [address] [place] [address] since your last visit? Include any pap smears or colon screening.  No

## 2022-03-15 NOTE — PROGRESS NOTES
3/15/2022 11:36 AM      Subjective:     [facility]   denies chest pain, chest pressure/discomfort, dyspnea, palpitations, irregular heart beats, near-syncope, syncope, fatigue, orthopnea, paroxysmal nocturnal dyspnea, exertional chest pressure/discomfort, claudication, lower extremity edema. Visit Vitals  /70 (BP 1 Location: Left arm, BP Patient Position: Sitting, BP Cuff Size: Small adult)   Pulse 79   Resp 16   Ht 5' 7\" (1.702 m)   Wt 147 lb 4.8 oz (66.8 kg)   SpO2 95%   BMI 23.07 kg/m²     Current Outpatient Medications   Medication Sig    lisinopriL (PRINIVIL, ZESTRIL) 20 mg tablet Take 20 mg by mouth daily.  warfarin (COUMADIN) 2.5 mg tablet Take 1 pill Sat, Sun, Mon, Tue, Wed, Thur (Patient taking differently: Take 2.5 mg by mouth daily. Take 1 pill Sat, Sun, Mon, Tue, Wed, Thur)    clopidogreL (Plavix) 75 mg tab Take 1 Tablet by mouth daily.  simvastatin (ZOCOR) 40 mg tablet Take 40 mg by mouth daily.  gabapentin (NEURONTIN) 300 mg capsule Take 300 mg by mouth three (3) times daily.  insulin lispro (HUMALOG U-100 INSULIN) 100 unit/mL injection by SubCUTAneous route. INSULIN PUMP    nitroglycerin (NITROSTAT) 0.4 mg SL tablet 1 Tab by SubLINGual route every five (5) minutes as needed for Chest Pain.  calcium 500 mg tab Take 1 Tablet by mouth daily.  cholecalciferol, vitamin D3, (VITAMIN D3) 4,000 unit cap Take  by mouth daily.  levothyroxine (SYNTHROID) 50 mcg tablet Take 50 mcg by mouth Daily (before breakfast).  CINNAMON BARK (CINNAMON PO) Take 2,000 mg by mouth daily.  warfarin (COUMADIN) 1 mg tablet TAKE 2 TABLETS BY MOUTH MONDAY, WEDNESDAY AND FRIDAY. TAKE 1 TABLET ALL OTHER DAYS. (Patient not taking: Reported on 3/15/2022)    warfarin (COUMADIN) 5 mg tablet Take 1 Tablet by mouth daily. (Patient not taking: Reported on 9/7/2021)    lisinopriL (PRINIVIL, ZESTRIL) 10 mg tablet Take 20 mg by mouth daily.  (Patient not taking: Reported on 3/15/2022)     No current facility-administered medications for this visit. Objective:      Visit Vitals  /70 (BP 1 Location: Left arm, BP Patient Position: Sitting, BP Cuff Size: Small adult)   Pulse 79   Resp 16   Ht 5' 7\" (1.702 m)   Wt 147 lb 4.8 oz (66.8 kg)   SpO2 95%   BMI 23.07 kg/m²       Data Review:   Labs:    Recent Results (from the past 24 hour(s))   AMB POC PT/INR    Collection Time: 03/15/22 10:55 AM   Result Value Ref Range    VALID INTERNAL CONTROL POC Yes     Prothrombin time (POC) 41.1 (A) 9.1 - 12 seconds    INR POC 3.4 (A) 1 - 1.5       Past Medical History:   Diagnosis Date    Arthritis     BILAT HANDS    CAD (coronary artery disease)     high cholesterol; heart murmur    Cancer (HCC)     uterine    Chronic kidney disease     Diabetes ([address]     Endocrine disease     hypothyroidism    Hyperlipidemia     Hypertension     Long term current use of anticoagulant therapy     Murmur     Neurological disorder     TIA    Other ill-defined conditions(799.89)     PVD; benign right breast tumor removed    Peripheral vascular angioplasty status 8/14/2013 8/14/13 s/p angioplasty L RFA    Stroke ([address]     TIA  X3 NO RESIDUAL    Thyroid disease     HYPOTHYROID      Past Surgical History:   Procedure Laterality Date    HX ANKLE FRACTURE TX      pins and plates right ankle    HX BREAST BIOPSY Right     negative  1998    HX CORONARY STENT PLACEMENT      HX GYN      hysterectomy    HX HEART CATHETERIZATION      HX OTHER SURGICAL      fem pop bypass on left    HX PTCA      [place] BREAST SURGERY PROCEDURE UNLISTED      tumor removed    [place] CARDIAC SURG PROCEDURE UNLIST      CARDIAC STENT  X2     Allergies   Allergen Reactions    Pcn [Penicillins] Anaphylaxis    Codeine Palpitations    Ivp Dye [Fd And C Blue No.1] Nausea and Vomiting     \"I was told in [facility] that it would stop my kidney up if I got it again. \"      Family History   Problem Relation Age of Onset    Cancer Mother  Hypertension Sister     Hypertension Sister     Heart Disease Brother 72    Stroke Sister         x2 sisters    Diabetes Father     Cancer Father       Social History     Socioeconomic History    Marital status:      Spouse name: Ceci Bejarano    Number of children: 5    Years of education: Not on file    Highest education level: Not on file   Occupational History     Employer: NOT EMPLOYED   Tobacco Use    Smoking status: Former Smoker     Packs/day: 5.00     Years: 30.00     Pack years: 150.00     Quit date: 2000     Years since quittin.5    Smokeless tobacco: Never Used   Vaping Use    Vaping Use: Never used   Substance and Sexual Activity    Alcohol use: No     Alcohol/week: 0.0 standard drinks    Drug use: No    Sexual activity: Not on file   Other Topics Concern     Service Not Asked    Blood Transfusions Not Asked    Caffeine Concern Not Asked    Occupational Exposure Not Asked    Hobby Hazards Not Asked    Sleep Concern Not Asked    Stress Concern Not Asked    Weight Concern Not Asked    Special Diet Not Asked    Back Care Not Asked    Exercise Not Asked    Bike Helmet Not Asked    [facility] Not Asked    Self-Exams Not Asked   Social History Narrative    Not on file     Social Determinants of Health     Financial Resource Strain:     Difficulty of Paying Living Expenses: Not on file   Food Insecurity:     Worried About Running Out of Food in the Last Year: Not on file    Ortiz of Food in the Last Year: Not on file   Transportation Needs:     Lack of Transportation (Medical): Not on file    Lack of Transportation (Non-Medical):  Not on file   Physical Activity:     Days of Exercise per Week: Not on file    Minutes of Exercise per Session: Not on file   Stress:     Feeling of Stress : Not on file   Social Connections:     Frequency of Communication with Friends and Family: Not on file    Frequency of Social Gatherings with Friends and Family: Not on file    Attends Mandaen Services: Not on file    Active Member of Clubs or Organizations: Not on file    Attends Club or Organization Meetings: Not on file    Marital Status: Not on file   Intimate Partner Violence:     Fear of Current or Ex-Partner: Not on file    Emotionally Abused: Not on file    Physically Abused: Not on file    Sexually Abused: Not on file   Housing Stability:     Unable to Pay for Housing in the Last Year: Not on file    Number of [place] in the Last Year: Not on file    Unstable Housing in the Last Year: Not on file       Assessment:       ICD-10-CM ICD-9-CM    1. Atherosclerosis of native coronary artery of native heart without angina pectoris  I25.10 414.01    2. Primary hypertension  I10 401.9    3. S/P PTCA (percutaneous transluminal coronary angioplasty)  Z98.61 V45.82    4. PAF (paroxysmal atrial fibrillation) ([facility])  I48.0 427.31    5. PVD (peripheral vascular disease) ([facility])  I73.9 443.9    6. Mixed hyperlipidemia  E78.2 272.2        Plan:     Atherosclerosis of native coronary artery of native heart without angina pectoris  Hx of ARCHANA to LCx and RCA . S/p LCX ARCHANA PCI 6/2021. Intolerant to BB therapy. Nitrates & CCB cause orthostatic hypotension. Stable. Continue current meds.       PAF  Stable. Continue Eliquis 5 mg BID      Essential hypertension  Controlled with current therapy     Mixed hyperlipidemia  On statin. FLP done by endo.      PAD (peripheral artery disease) ([facility]  S/p left SFA stent 7/2021, Rt SFA 70% lesion asymptomatic. Manage medically. Stable.  No symptoms.      S/p right SFA atherectomy and DCB PTA 10/2017.    S/p b/l iliac stents 7/2014.    s/p left SFA laser athetectomy and stent 9/2011  s/p left SFA angioplasty for ISR 1/2012.   s/p left SFA redo laser atherectomy and PTA 10/2012  S/p left SFA angioplasty 8/2013

## 2022-03-19 PROBLEM — I95.9 HYPOTENSION: Status: ACTIVE | Noted: 2019-12-23

## 2022-03-19 PROBLEM — I73.9 PVD (PERIPHERAL VASCULAR DISEASE) (HCC): Status: ACTIVE | Noted: 2021-06-04

## 2022-03-19 PROBLEM — I48.0 PAF (PAROXYSMAL ATRIAL FIBRILLATION) (HCC): Status: ACTIVE | Noted: 2020-07-30

## 2022-03-19 PROBLEM — R06.09 DOE (DYSPNEA ON EXERTION): Status: ACTIVE | Noted: 2019-11-18

## 2022-03-30 ENCOUNTER — ANTI-COAG VISIT (OUTPATIENT)
Dept: CARDIOLOGY CLINIC | Age: 72
End: 2022-03-30
Payer: MEDICARE

## 2022-03-30 DIAGNOSIS — I48.0 PAF (PAROXYSMAL ATRIAL FIBRILLATION) (HCC): ICD-10-CM

## 2022-03-30 DIAGNOSIS — Z79.01 LONG TERM (CURRENT) USE OF ANTICOAGULANTS: Primary | ICD-10-CM

## 2022-03-30 LAB
INR BLD: 2.2 (ref 1–1.5)
PT POC: 26.4 SECONDS (ref 9.1–12)
VALID INTERNAL CONTROL?: YES

## 2022-03-30 PROCEDURE — 85610 PROTHROMBIN TIME: CPT | Performed by: INTERNAL MEDICINE

## 2022-04-09 RX ORDER — CLOPIDOGREL BISULFATE 75 MG/1
TABLET ORAL
Qty: 90 TABLET | Refills: 0 | Status: SHIPPED | OUTPATIENT
Start: 2022-04-09

## 2022-04-20 ENCOUNTER — TELEPHONE (OUTPATIENT)
Dept: CARDIOLOGY CLINIC | Age: 72
End: 2022-04-20

## 2022-04-20 ENCOUNTER — ANTI-COAG VISIT (OUTPATIENT)
Dept: CARDIOLOGY CLINIC | Age: 72
End: 2022-04-20
Payer: MEDICARE

## 2022-04-20 DIAGNOSIS — I48.0 PAF (PAROXYSMAL ATRIAL FIBRILLATION) (HCC): ICD-10-CM

## 2022-04-20 DIAGNOSIS — Z79.01 LONG TERM (CURRENT) USE OF ANTICOAGULANTS: Primary | ICD-10-CM

## 2022-04-20 LAB
INR BLD: 3.2 (ref 1–1.5)
PT POC: 37.9 SECONDS (ref 9.1–12)
VALID INTERNAL CONTROL?: YES

## 2022-04-20 PROCEDURE — 85610 PROTHROMBIN TIME: CPT | Performed by: INTERNAL MEDICINE

## 2022-04-29 DIAGNOSIS — I48.0 PAF (PAROXYSMAL ATRIAL FIBRILLATION) (HCC): Primary | ICD-10-CM

## 2024-04-13 ENCOUNTER — HOSPITAL ENCOUNTER (INPATIENT)
Facility: HOSPITAL | Age: 74
LOS: 6 days | Discharge: HOME | End: 2024-04-19
Attending: INTERNAL MEDICINE | Admitting: INTERNAL MEDICINE
Payer: MEDICARE

## 2024-04-13 ENCOUNTER — APPOINTMENT (OUTPATIENT)
Facility: HOSPITAL | Age: 74
End: 2024-04-13
Payer: MEDICARE

## 2024-04-13 DIAGNOSIS — R73.9 HYPERGLYCEMIA: Primary | ICD-10-CM

## 2024-04-13 DIAGNOSIS — N17.9 AKI (ACUTE KIDNEY INJURY) (HCC): ICD-10-CM

## 2024-04-13 DIAGNOSIS — I50.9 HEART FAILURE, UNSPECIFIED HF CHRONICITY, UNSPECIFIED HEART FAILURE TYPE (HCC): ICD-10-CM

## 2024-04-13 DIAGNOSIS — R93.89 ABNORMAL CHEST X-RAY: ICD-10-CM

## 2024-04-13 DIAGNOSIS — E83.42 HYPOMAGNESEMIA: ICD-10-CM

## 2024-04-13 DIAGNOSIS — E11.10 LACTIC ACIDOSIS DUE TO DIABETES MELLITUS (HCC): ICD-10-CM

## 2024-04-13 LAB
ALBUMIN SERPL-MCNC: 2.8 G/DL (ref 3.5–5)
ALBUMIN/GLOB SERPL: 0.9 (ref 1.1–2.2)
ALP SERPL-CCNC: 87 U/L (ref 45–117)
ALT SERPL-CCNC: 18 U/L (ref 12–78)
ANION GAP SERPL CALC-SCNC: 11 MMOL/L (ref 5–15)
AST SERPL-CCNC: 104 U/L (ref 15–37)
BASE DEFICIT BLDV-SCNC: 7.1 MMOL/L
BASOPHILS # BLD: 0 K/UL (ref 0–0.1)
BASOPHILS NFR BLD: 0 % (ref 0–1)
BILIRUB SERPL-MCNC: 0.7 MG/DL (ref 0.2–1)
BUN SERPL-MCNC: 31 MG/DL (ref 6–20)
BUN/CREAT SERPL: 16 (ref 12–20)
CALCIUM SERPL-MCNC: 8.2 MG/DL (ref 8.5–10.1)
CHLORIDE SERPL-SCNC: 108 MMOL/L (ref 97–108)
CO2 SERPL-SCNC: 20 MMOL/L (ref 21–32)
CREAT SERPL-MCNC: 1.91 MG/DL (ref 0.55–1.02)
DIFFERENTIAL METHOD BLD: ABNORMAL
EOSINOPHIL # BLD: 0.3 K/UL (ref 0–0.4)
EOSINOPHIL NFR BLD: 3 % (ref 0–7)
ERYTHROCYTE [DISTWIDTH] IN BLOOD BY AUTOMATED COUNT: 14.1 % (ref 11.5–14.5)
GLOBULIN SER CALC-MCNC: 3.1 G/DL (ref 2–4)
GLUCOSE BLD STRIP.AUTO-MCNC: 339 MG/DL (ref 65–117)
GLUCOSE BLD STRIP.AUTO-MCNC: 344 MG/DL (ref 65–117)
GLUCOSE BLD STRIP.AUTO-MCNC: 394 MG/DL (ref 65–117)
GLUCOSE SERPL-MCNC: 347 MG/DL (ref 65–100)
HCO3 BLDV-SCNC: 20.1 MMOL/L (ref 23–28)
HCT VFR BLD AUTO: 37.1 % (ref 35–47)
HGB BLD-MCNC: 12 G/DL (ref 11.5–16)
IMM GRANULOCYTES # BLD AUTO: 0.1 K/UL (ref 0–0.04)
IMM GRANULOCYTES NFR BLD AUTO: 1 % (ref 0–0.5)
LACTATE BLD-SCNC: 2.36 MMOL/L (ref 0.4–2)
LACTATE BLD-SCNC: 2.9 MMOL/L (ref 0.4–2)
LACTATE BLD-SCNC: 5.81 MMOL/L (ref 0.4–2)
LACTATE SERPL-SCNC: 4.7 MMOL/L (ref 0.4–2)
LYMPHOCYTES # BLD: 0.6 K/UL (ref 0.8–3.5)
LYMPHOCYTES NFR BLD: 6 % (ref 12–49)
MAGNESIUM SERPL-MCNC: 1.5 MG/DL (ref 1.6–2.4)
MCH RBC QN AUTO: 31.3 PG (ref 26–34)
MCHC RBC AUTO-ENTMCNC: 32.3 G/DL (ref 30–36.5)
MCV RBC AUTO: 96.6 FL (ref 80–99)
MONOCYTES # BLD: 0.6 K/UL (ref 0–1)
MONOCYTES NFR BLD: 6 % (ref 5–13)
NEUTS SEG # BLD: 7.7 K/UL (ref 1.8–8)
NEUTS SEG NFR BLD: 83 % (ref 32–75)
NRBC # BLD: 0 K/UL (ref 0–0.01)
NRBC BLD-RTO: 0 PER 100 WBC
NT PRO BNP: 1690 PG/ML
PCO2 BLDV: 45.7 MMHG (ref 41–51)
PH BLDV: 7.25 (ref 7.32–7.42)
PLATELET # BLD AUTO: 183 K/UL (ref 150–400)
PMV BLD AUTO: 10.2 FL (ref 8.9–12.9)
PO2 BLDV: <27 MMHG (ref 25–40)
POTASSIUM SERPL-SCNC: 4 MMOL/L (ref 3.5–5.1)
PROT SERPL-MCNC: 5.9 G/DL (ref 6.4–8.2)
RBC # BLD AUTO: 3.84 M/UL (ref 3.8–5.2)
SERVICE CMNT-IMP: ABNORMAL
SODIUM SERPL-SCNC: 139 MMOL/L (ref 136–145)
SPECIMEN TYPE: ABNORMAL
WBC # BLD AUTO: 9.3 K/UL (ref 3.6–11)

## 2024-04-13 PROCEDURE — 85610 PROTHROMBIN TIME: CPT

## 2024-04-13 PROCEDURE — 36415 COLL VENOUS BLD VENIPUNCTURE: CPT

## 2024-04-13 PROCEDURE — 71045 X-RAY EXAM CHEST 1 VIEW: CPT

## 2024-04-13 PROCEDURE — 80053 COMPREHEN METABOLIC PANEL: CPT

## 2024-04-13 PROCEDURE — 85025 COMPLETE CBC W/AUTO DIFF WBC: CPT

## 2024-04-13 PROCEDURE — 6370000000 HC RX 637 (ALT 250 FOR IP): Performed by: PHYSICIAN ASSISTANT

## 2024-04-13 PROCEDURE — 96361 HYDRATE IV INFUSION ADD-ON: CPT

## 2024-04-13 PROCEDURE — 82803 BLOOD GASES ANY COMBINATION: CPT

## 2024-04-13 PROCEDURE — 83605 ASSAY OF LACTIC ACID: CPT

## 2024-04-13 PROCEDURE — 99285 EMERGENCY DEPT VISIT HI MDM: CPT

## 2024-04-13 PROCEDURE — 70450 CT HEAD/BRAIN W/O DYE: CPT

## 2024-04-13 PROCEDURE — 96375 TX/PRO/DX INJ NEW DRUG ADDON: CPT

## 2024-04-13 PROCEDURE — 96374 THER/PROPH/DIAG INJ IV PUSH: CPT

## 2024-04-13 PROCEDURE — 6360000002 HC RX W HCPCS: Performed by: PHYSICIAN ASSISTANT

## 2024-04-13 PROCEDURE — 82962 GLUCOSE BLOOD TEST: CPT

## 2024-04-13 PROCEDURE — 1100000000 HC RM PRIVATE

## 2024-04-13 PROCEDURE — 87040 BLOOD CULTURE FOR BACTERIA: CPT

## 2024-04-13 PROCEDURE — 2580000003 HC RX 258: Performed by: INTERNAL MEDICINE

## 2024-04-13 PROCEDURE — 83880 ASSAY OF NATRIURETIC PEPTIDE: CPT

## 2024-04-13 PROCEDURE — 2580000003 HC RX 258: Performed by: PHYSICIAN ASSISTANT

## 2024-04-13 PROCEDURE — 93005 ELECTROCARDIOGRAM TRACING: CPT | Performed by: PHYSICIAN ASSISTANT

## 2024-04-13 PROCEDURE — 83735 ASSAY OF MAGNESIUM: CPT

## 2024-04-13 RX ORDER — CLOPIDOGREL BISULFATE 75 MG/1
75 TABLET ORAL DAILY
Status: DISCONTINUED | OUTPATIENT
Start: 2024-04-14 | End: 2024-04-19 | Stop reason: HOSPADM

## 2024-04-13 RX ORDER — SODIUM CHLORIDE 9 MG/ML
INJECTION, SOLUTION INTRAVENOUS PRN
Status: DISCONTINUED | OUTPATIENT
Start: 2024-04-13 | End: 2024-04-19 | Stop reason: HOSPADM

## 2024-04-13 RX ORDER — ENOXAPARIN SODIUM 100 MG/ML
30 INJECTION SUBCUTANEOUS DAILY
Status: DISCONTINUED | OUTPATIENT
Start: 2024-04-14 | End: 2024-04-19 | Stop reason: HOSPADM

## 2024-04-13 RX ORDER — SODIUM CHLORIDE 9 MG/ML
INJECTION, SOLUTION INTRAVENOUS CONTINUOUS
Status: DISCONTINUED | OUTPATIENT
Start: 2024-04-13 | End: 2024-04-14

## 2024-04-13 RX ORDER — POLYETHYLENE GLYCOL 3350 17 G/17G
17 POWDER, FOR SOLUTION ORAL DAILY PRN
Status: DISCONTINUED | OUTPATIENT
Start: 2024-04-13 | End: 2024-04-19 | Stop reason: HOSPADM

## 2024-04-13 RX ORDER — ENOXAPARIN SODIUM 100 MG/ML
30 INJECTION SUBCUTANEOUS DAILY
Status: DISCONTINUED | OUTPATIENT
Start: 2024-04-14 | End: 2024-04-13

## 2024-04-13 RX ORDER — 0.9 % SODIUM CHLORIDE 0.9 %
1000 INTRAVENOUS SOLUTION INTRAVENOUS ONCE
Status: COMPLETED | OUTPATIENT
Start: 2024-04-13 | End: 2024-04-13

## 2024-04-13 RX ORDER — LEVOTHYROXINE SODIUM 0.05 MG/1
50 TABLET ORAL
Status: DISCONTINUED | OUTPATIENT
Start: 2024-04-14 | End: 2024-04-19 | Stop reason: HOSPADM

## 2024-04-13 RX ORDER — ACETAMINOPHEN 325 MG/1
650 TABLET ORAL EVERY 6 HOURS PRN
Status: DISCONTINUED | OUTPATIENT
Start: 2024-04-13 | End: 2024-04-19 | Stop reason: HOSPADM

## 2024-04-13 RX ORDER — GABAPENTIN 300 MG/1
300 CAPSULE ORAL 3 TIMES DAILY
Status: DISCONTINUED | OUTPATIENT
Start: 2024-04-13 | End: 2024-04-13

## 2024-04-13 RX ORDER — ATORVASTATIN CALCIUM 20 MG/1
20 TABLET, FILM COATED ORAL DAILY
Status: DISCONTINUED | OUTPATIENT
Start: 2024-04-14 | End: 2024-04-19 | Stop reason: HOSPADM

## 2024-04-13 RX ORDER — ACETAMINOPHEN 650 MG/1
650 SUPPOSITORY RECTAL EVERY 6 HOURS PRN
Status: DISCONTINUED | OUTPATIENT
Start: 2024-04-13 | End: 2024-04-19 | Stop reason: HOSPADM

## 2024-04-13 RX ORDER — LEVOFLOXACIN 5 MG/ML
750 INJECTION, SOLUTION INTRAVENOUS
Status: COMPLETED | OUTPATIENT
Start: 2024-04-13 | End: 2024-04-13

## 2024-04-13 RX ORDER — SODIUM CHLORIDE 0.9 % (FLUSH) 0.9 %
5-40 SYRINGE (ML) INJECTION PRN
Status: DISCONTINUED | OUTPATIENT
Start: 2024-04-13 | End: 2024-04-19 | Stop reason: HOSPADM

## 2024-04-13 RX ORDER — MAGNESIUM SULFATE 1 G/100ML
1000 INJECTION INTRAVENOUS
Status: COMPLETED | OUTPATIENT
Start: 2024-04-13 | End: 2024-04-13

## 2024-04-13 RX ORDER — SODIUM CHLORIDE 0.9 % (FLUSH) 0.9 %
5-40 SYRINGE (ML) INJECTION EVERY 12 HOURS SCHEDULED
Status: DISCONTINUED | OUTPATIENT
Start: 2024-04-13 | End: 2024-04-19 | Stop reason: HOSPADM

## 2024-04-13 RX ORDER — VITAMIN B COMPLEX
2000 TABLET ORAL DAILY
Status: DISCONTINUED | OUTPATIENT
Start: 2024-04-14 | End: 2024-04-19 | Stop reason: HOSPADM

## 2024-04-13 RX ORDER — ONDANSETRON 4 MG/1
4 TABLET, ORALLY DISINTEGRATING ORAL EVERY 8 HOURS PRN
Status: DISCONTINUED | OUTPATIENT
Start: 2024-04-13 | End: 2024-04-19 | Stop reason: HOSPADM

## 2024-04-13 RX ORDER — ONDANSETRON 2 MG/ML
4 INJECTION INTRAMUSCULAR; INTRAVENOUS EVERY 6 HOURS PRN
Status: DISCONTINUED | OUTPATIENT
Start: 2024-04-13 | End: 2024-04-19 | Stop reason: HOSPADM

## 2024-04-13 RX ADMIN — INSULIN HUMAN 5 UNITS: 100 INJECTION, SOLUTION PARENTERAL at 22:11

## 2024-04-13 RX ADMIN — SODIUM CHLORIDE 1000 ML: 9 INJECTION, SOLUTION INTRAVENOUS at 19:31

## 2024-04-13 RX ADMIN — LEVOFLOXACIN 750 MG: 5 INJECTION, SOLUTION INTRAVENOUS at 22:05

## 2024-04-13 RX ADMIN — SODIUM CHLORIDE: 9 INJECTION, SOLUTION INTRAVENOUS at 23:53

## 2024-04-13 RX ADMIN — MAGNESIUM SULFATE HEPTAHYDRATE 1000 MG: 1 INJECTION, SOLUTION INTRAVENOUS at 22:07

## 2024-04-13 RX ADMIN — SODIUM CHLORIDE, PRESERVATIVE FREE 10 ML: 5 INJECTION INTRAVENOUS at 23:39

## 2024-04-13 ASSESSMENT — PAIN - FUNCTIONAL ASSESSMENT
PAIN_FUNCTIONAL_ASSESSMENT: NONE - DENIES PAIN
PAIN_FUNCTIONAL_ASSESSMENT: NONE - DENIES PAIN

## 2024-04-13 NOTE — ED NOTES
Bedside and Verbal shift change report given to AYLA Perkins (oncoming nurse) by AYLA Mercado (offgoing nurse). Report included the following information ED SBAR, Adult Overview, MAR, and Neuro Assessment.

## 2024-04-13 NOTE — ED PROVIDER NOTES
flush 0.9 % injection 5-40 mL (has no administration in time range)   0.9 % sodium chloride infusion (has no administration in time range)   ondansetron (ZOFRAN-ODT) disintegrating tablet 4 mg (has no administration in time range)     Or   ondansetron (ZOFRAN) injection 4 mg (has no administration in time range)   polyethylene glycol (GLYCOLAX) packet 17 g (has no administration in time range)   acetaminophen (TYLENOL) tablet 650 mg (has no administration in time range)     Or   acetaminophen (TYLENOL) suppository 650 mg (has no administration in time range)   0.9 % sodium chloride infusion ( IntraVENous New Bag 4/13/24 2353)   Vitamin D (CHOLECALCIFEROL) tablet 2,000 Units (has no administration in time range)   clopidogrel (PLAVIX) tablet 75 mg (has no administration in time range)   levothyroxine (SYNTHROID) tablet 50 mcg (has no administration in time range)   atorvastatin (LIPITOR) tablet 20 mg (has no administration in time range)   enoxaparin Sodium (LOVENOX) injection 30 mg (has no administration in time range)   sodium chloride 0.9 % bolus 1,000 mL (0 mLs IntraVENous Stopped 4/13/24 2002)   levoFLOXacin (LEVAQUIN) 750 MG/150ML infusion 750 mg (0 mg IntraVENous Stopped 4/13/24 2335)   magnesium sulfate 1000 mg in dextrose 5% 100 mL IVPB (0 mg IntraVENous Stopped 4/13/24 2307)   insulin regular (HUMULIN R;NOVOLIN R) injection 5 Units (5 Units IntraVENous Given 4/13/24 2211)       CONSULTS: (Who and What was discussed)  IP CONSULT TO HOSPITALIST  IP CONSULT TO PHARMACY    Chronic Conditions: type I diabetes, CKD, hypertension, prior TIAs, and additional history as noted above    Records Reviewed (source and summary of external records): Nursing Notes and Old Medical Records    MDM (CC/HPI Summary, DDx, ED Course, Reassessment, Disposition Considerations -Tests not done, Shared Decision Making, Pt Expectation of Test or Tx.):     Patient is a 73-year-old female with past medical of type I diabetes, CKD,  hypertension, prior TIAs, and additional history as noted above, who presents to the ED for evaluation of elevated blood sugars.  Per EMS, noted to last blood sugar was 526 and family gave patient 13 units of insulin at 1500.  Patient's insulin pump has not been working.  Patient provides little of the history, states she is unsure of how long it has not been working.  Family at bedside states they are also unsure as \"it had just not been beeping\" but noticed today it would not turn on.  States they looked at this because she said overall she did not feel well and noticed it was not working.  Patient had 2 episodes of nonbilious, bloody vomiting today, but denies any abdominal pain.  Denies any changes in bowel or bladder habits.  Denies any chest pain or shortness of breath.      Of note, patient's  is now at bedside and states she is acting her normal self/baseline mental status.       See ED course note below for additional MDM  ED Course as of 04/14/24 0001   Sat Apr 13, 2024 1922 Suspect lactic acidosis secondary to starvation ketosis and hyperglycemia, patient is afebrile, doubt infectious etiology/sepsis, will just continue IV fluids at this time and re-evaluate  [TL]   1941 Preliminary EKG interpreted by me.  Shows normal sinus rhythm with a HR of 91.  No ST elevations or depressions concerning for ischemia. Normal intervals. [MB]   2104 Repeat lactic 4.7     Magnesium 1.5, will replete [TL]   2205 Shared decision making performed and care plan created together, discussed results, diagnosis, and treatment plan including admission for further evaluation and care.  Patient and  verbalized understanding and agreement    Patient's condition is stable for admission [TL]   2205 Lactic continues to improve after IV fluids.  Although given lactic acidosis, hyperglycemia,  JESSICA, will admit for further evaluation and care. [TL]   2218 On evaluation patient is alert and oriented to person, place, but not  care provider to review them with you.                    DISCONTINUED MEDICATIONS:  Current Discharge Medication List            I am the Primary Clinician of Record.   NOAH Mi (electronically signed)    (Please note that parts of this dictation were completed with voice recognition software. Quite often unanticipated grammatical, syntax, homophones, and other interpretive errors are inadvertently transcribed by the computer software. Please disregards these errors. Please excuse any errors that have escaped final proofreading.)         Sheela Danielle PA  04/14/24 0001

## 2024-04-14 ENCOUNTER — APPOINTMENT (OUTPATIENT)
Facility: HOSPITAL | Age: 74
End: 2024-04-14
Payer: MEDICARE

## 2024-04-14 LAB
ANION GAP SERPL CALC-SCNC: 7 MMOL/L (ref 5–15)
APPEARANCE UR: ABNORMAL
BACTERIA URNS QL MICRO: ABNORMAL /HPF
BASOPHILS # BLD: 0 K/UL (ref 0–0.1)
BASOPHILS NFR BLD: 0 % (ref 0–1)
BILIRUB UR QL: NEGATIVE
BUN SERPL-MCNC: 40 MG/DL (ref 6–20)
BUN/CREAT SERPL: 22 (ref 12–20)
CALCIUM SERPL-MCNC: 8.9 MG/DL (ref 8.5–10.1)
CHLORIDE SERPL-SCNC: 105 MMOL/L (ref 97–108)
CO2 SERPL-SCNC: 19 MMOL/L (ref 21–32)
COLOR UR: ABNORMAL
CREAT SERPL-MCNC: 1.81 MG/DL (ref 0.55–1.02)
DIFFERENTIAL METHOD BLD: ABNORMAL
EKG ATRIAL RATE: 91 BPM
EKG DIAGNOSIS: NORMAL
EKG P AXIS: 69 DEGREES
EKG P-R INTERVAL: 190 MS
EKG Q-T INTERVAL: 354 MS
EKG QRS DURATION: 80 MS
EKG QTC CALCULATION (BAZETT): 435 MS
EKG R AXIS: 10 DEGREES
EKG T AXIS: 103 DEGREES
EKG VENTRICULAR RATE: 91 BPM
EOSINOPHIL # BLD: 0 K/UL (ref 0–0.4)
EOSINOPHIL NFR BLD: 0 % (ref 0–7)
EPITH CASTS URNS QL MICRO: ABNORMAL /LPF
ERYTHROCYTE [DISTWIDTH] IN BLOOD BY AUTOMATED COUNT: 14.3 % (ref 11.5–14.5)
EST. AVERAGE GLUCOSE BLD GHB EST-MCNC: 131 MG/DL
GLUCOSE BLD STRIP.AUTO-MCNC: 226 MG/DL (ref 65–117)
GLUCOSE BLD STRIP.AUTO-MCNC: 289 MG/DL (ref 65–117)
GLUCOSE BLD STRIP.AUTO-MCNC: 347 MG/DL (ref 65–117)
GLUCOSE BLD STRIP.AUTO-MCNC: 94 MG/DL (ref 65–117)
GLUCOSE BLD STRIP.AUTO-MCNC: 96 MG/DL (ref 65–117)
GLUCOSE SERPL-MCNC: 367 MG/DL (ref 65–100)
GLUCOSE UR STRIP.AUTO-MCNC: NEGATIVE MG/DL
HBA1C MFR BLD: 6.2 % (ref 4–5.6)
HCT VFR BLD AUTO: 39.3 % (ref 35–47)
HGB BLD-MCNC: 12.7 G/DL (ref 11.5–16)
HGB UR QL STRIP: ABNORMAL
IMM GRANULOCYTES # BLD AUTO: 0.1 K/UL (ref 0–0.04)
IMM GRANULOCYTES NFR BLD AUTO: 1 % (ref 0–0.5)
INR PPP: 1.1 (ref 0.9–1.1)
INR PPP: 1.1 (ref 0.9–1.1)
KETONES UR QL STRIP.AUTO: ABNORMAL MG/DL
LACTATE SERPL-SCNC: 2.1 MMOL/L (ref 0.4–2)
LACTATE SERPL-SCNC: 2.3 MMOL/L (ref 0.4–2)
LEUKOCYTE ESTERASE UR QL STRIP.AUTO: ABNORMAL
LYMPHOCYTES # BLD: 0.7 K/UL (ref 0.8–3.5)
LYMPHOCYTES NFR BLD: 5 % (ref 12–49)
MCH RBC QN AUTO: 31.2 PG (ref 26–34)
MCHC RBC AUTO-ENTMCNC: 32.3 G/DL (ref 30–36.5)
MCV RBC AUTO: 96.6 FL (ref 80–99)
MONOCYTES # BLD: 0.9 K/UL (ref 0–1)
MONOCYTES NFR BLD: 7 % (ref 5–13)
NEUTS SEG # BLD: 11.4 K/UL (ref 1.8–8)
NEUTS SEG NFR BLD: 87 % (ref 32–75)
NITRITE UR QL STRIP.AUTO: NEGATIVE
NRBC # BLD: 0 K/UL (ref 0–0.01)
NRBC BLD-RTO: 0 PER 100 WBC
PH UR STRIP: 5 (ref 5–8)
PLATELET # BLD AUTO: 176 K/UL (ref 150–400)
PMV BLD AUTO: 10.8 FL (ref 8.9–12.9)
POTASSIUM SERPL-SCNC: 4.7 MMOL/L (ref 3.5–5.1)
PROCALCITONIN SERPL-MCNC: 6.38 NG/ML
PROT UR STRIP-MCNC: 30 MG/DL
PROTHROMBIN TIME: 11.3 SEC (ref 9–11.1)
PROTHROMBIN TIME: 11.4 SEC (ref 9–11.1)
RBC # BLD AUTO: 4.07 M/UL (ref 3.8–5.2)
RBC #/AREA URNS HPF: ABNORMAL /HPF (ref 0–5)
RBC MORPH BLD: ABNORMAL
SERVICE CMNT-IMP: ABNORMAL
SERVICE CMNT-IMP: NORMAL
SERVICE CMNT-IMP: NORMAL
SODIUM SERPL-SCNC: 131 MMOL/L (ref 136–145)
SP GR UR REFRACTOMETRY: 1.01
URINE CULTURE IF INDICATED: ABNORMAL
UROBILINOGEN UR QL STRIP.AUTO: 1 EU/DL (ref 0.2–1)
WBC # BLD AUTO: 13.1 K/UL (ref 3.6–11)
WBC URNS QL MICRO: >100 /HPF (ref 0–4)
YEAST URNS QL MICRO: PRESENT

## 2024-04-14 PROCEDURE — 84145 PROCALCITONIN (PCT): CPT

## 2024-04-14 PROCEDURE — 2060000000 HC ICU INTERMEDIATE R&B

## 2024-04-14 PROCEDURE — 82962 GLUCOSE BLOOD TEST: CPT

## 2024-04-14 PROCEDURE — 85610 PROTHROMBIN TIME: CPT

## 2024-04-14 PROCEDURE — 51701 INSERT BLADDER CATHETER: CPT

## 2024-04-14 PROCEDURE — 83605 ASSAY OF LACTIC ACID: CPT

## 2024-04-14 PROCEDURE — 80048 BASIC METABOLIC PNL TOTAL CA: CPT

## 2024-04-14 PROCEDURE — 6360000002 HC RX W HCPCS: Performed by: INTERNAL MEDICINE

## 2024-04-14 PROCEDURE — 81001 URINALYSIS AUTO W/SCOPE: CPT

## 2024-04-14 PROCEDURE — 2580000003 HC RX 258: Performed by: INTERNAL MEDICINE

## 2024-04-14 PROCEDURE — 6370000000 HC RX 637 (ALT 250 FOR IP): Performed by: INTERNAL MEDICINE

## 2024-04-14 PROCEDURE — 87086 URINE CULTURE/COLONY COUNT: CPT

## 2024-04-14 PROCEDURE — 71250 CT THORAX DX C-: CPT

## 2024-04-14 PROCEDURE — 83036 HEMOGLOBIN GLYCOSYLATED A1C: CPT

## 2024-04-14 PROCEDURE — 2700000000 HC OXYGEN THERAPY PER DAY

## 2024-04-14 PROCEDURE — 36415 COLL VENOUS BLD VENIPUNCTURE: CPT

## 2024-04-14 PROCEDURE — 85025 COMPLETE CBC W/AUTO DIFF WBC: CPT

## 2024-04-14 RX ORDER — GLUCAGON 1 MG/ML
1 KIT INJECTION PRN
Status: DISCONTINUED | OUTPATIENT
Start: 2024-04-14 | End: 2024-04-15

## 2024-04-14 RX ORDER — DEXTROSE MONOHYDRATE 100 MG/ML
INJECTION, SOLUTION INTRAVENOUS CONTINUOUS PRN
Status: DISCONTINUED | OUTPATIENT
Start: 2024-04-14 | End: 2024-04-16

## 2024-04-14 RX ORDER — INSULIN GLARGINE 100 [IU]/ML
10 INJECTION, SOLUTION SUBCUTANEOUS ONCE
Status: COMPLETED | OUTPATIENT
Start: 2024-04-14 | End: 2024-04-14

## 2024-04-14 RX ORDER — INSULIN LISPRO 100 [IU]/ML
0-4 INJECTION, SOLUTION INTRAVENOUS; SUBCUTANEOUS EVERY 4 HOURS
Status: DISCONTINUED | OUTPATIENT
Start: 2024-04-14 | End: 2024-04-14

## 2024-04-14 RX ORDER — DEXTROSE MONOHYDRATE 100 MG/ML
INJECTION, SOLUTION INTRAVENOUS CONTINUOUS PRN
Status: DISCONTINUED | OUTPATIENT
Start: 2024-04-14 | End: 2024-04-15

## 2024-04-14 RX ORDER — GLUCAGON 1 MG/ML
1 KIT INJECTION PRN
Status: DISCONTINUED | OUTPATIENT
Start: 2024-04-14 | End: 2024-04-14 | Stop reason: SDUPTHER

## 2024-04-14 RX ORDER — BUMETANIDE 0.25 MG/ML
1 INJECTION INTRAMUSCULAR; INTRAVENOUS 2 TIMES DAILY
Status: DISCONTINUED | OUTPATIENT
Start: 2024-04-14 | End: 2024-04-19 | Stop reason: HOSPADM

## 2024-04-14 RX ORDER — LEVOFLOXACIN 750 MG/1
750 TABLET, FILM COATED ORAL EVERY OTHER DAY
Status: COMPLETED | OUTPATIENT
Start: 2024-04-15 | End: 2024-04-19

## 2024-04-14 RX ADMIN — INSULIN LISPRO 2 UNITS: 100 INJECTION, SOLUTION INTRAVENOUS; SUBCUTANEOUS at 06:59

## 2024-04-14 RX ADMIN — Medication 2000 UNITS: at 08:40

## 2024-04-14 RX ADMIN — INSULIN LISPRO 3 UNITS: 100 INJECTION, SOLUTION INTRAVENOUS; SUBCUTANEOUS at 02:07

## 2024-04-14 RX ADMIN — INSULIN GLARGINE 10 UNITS: 100 INJECTION, SOLUTION SUBCUTANEOUS at 02:07

## 2024-04-14 RX ADMIN — SODIUM CHLORIDE, PRESERVATIVE FREE 10 ML: 5 INJECTION INTRAVENOUS at 08:42

## 2024-04-14 RX ADMIN — BUMETANIDE 1 MG: 0.25 INJECTION INTRAMUSCULAR; INTRAVENOUS at 17:10

## 2024-04-14 RX ADMIN — BUMETANIDE 1 MG: 0.25 INJECTION INTRAMUSCULAR; INTRAVENOUS at 12:05

## 2024-04-14 RX ADMIN — ENOXAPARIN SODIUM 30 MG: 100 INJECTION SUBCUTANEOUS at 08:40

## 2024-04-14 RX ADMIN — SODIUM CHLORIDE: 9 INJECTION, SOLUTION INTRAVENOUS at 08:42

## 2024-04-14 RX ADMIN — LEVOTHYROXINE SODIUM 50 MCG: 0.05 TABLET ORAL at 07:01

## 2024-04-14 RX ADMIN — CLOPIDOGREL BISULFATE 75 MG: 75 TABLET ORAL at 08:40

## 2024-04-14 RX ADMIN — ATORVASTATIN CALCIUM 20 MG: 20 TABLET, FILM COATED ORAL at 08:40

## 2024-04-14 RX ADMIN — INSULIN HUMAN 10 UNITS: 100 INJECTION, SOLUTION PARENTERAL at 06:59

## 2024-04-14 RX ADMIN — ONDANSETRON 4 MG: 2 INJECTION INTRAMUSCULAR; INTRAVENOUS at 17:10

## 2024-04-14 RX ADMIN — ONDANSETRON 4 MG: 2 INJECTION INTRAMUSCULAR; INTRAVENOUS at 10:36

## 2024-04-14 ASSESSMENT — PAIN - FUNCTIONAL ASSESSMENT: PAIN_FUNCTIONAL_ASSESSMENT: NONE - DENIES PAIN

## 2024-04-14 NOTE — PLAN OF CARE
Problem: Skin/Tissue Integrity  Goal: Absence of new skin breakdown  Description: 1.  Monitor for areas of redness and/or skin breakdown  2.  Assess vascular access sites hourly  3.  Every 4-6 hours minimum:  Change oxygen saturation probe site  4.  Every 4-6 hours:  If on nasal continuous positive airway pressure, respiratory therapy assess nares and determine need for appliance change or resting period.  Outcome: Progressing     Problem: Safety - Adult  Goal: Free from fall injury  Outcome: Progressing     Problem: Neurosensory - Adult  Goal: Achieves stable or improved neurological status  Outcome: Progressing  Goal: Absence of seizures  Outcome: Progressing

## 2024-04-14 NOTE — PROGRESS NOTES
Hospitalist Progress Note    NAME: Almaz Goyal   : 1950   MRN: [id number]     Date/Time: 2024 11:27 AM  Patient PCP: Merline Flores APRN - NP    Assessment / Plan:       Uncontrolled type I diabetes mellitus  Non anion gap metabolic acidosis   noted that the SQ needle was dislodged and so patient likely has not been getting her insulin the last couple of days PTA  S/p regular insulin 5 units in ED; lantus 10 units x 1   Restarting insulin pump today - basal and auto bolus   They follow with Dr Michelle as OP    Acute pulmonary edema  Hypoxemia  -noted on CT chest  -NTproBNP 1690   -s/p NS bolus in ED and received IVFs since admission - stopping  -start IV bumex  -needing 2LNC, try wean off as we diurese      JESSICA vs CKD  Non anion gap metabolic acidosis  -no recent baseline  -stopping IVF due to pulm edema  -follow Cr, she maybe near baseline.  Need UA      Atypical Pneumonia?  -get UA, will need straight cath - discussed with nursing  -PCT 6.3.  Continue Levaquin     CAD,    Hx of SHANNAN to LCx and RCA .   S/p LCX SHANNAN PCI 2021.    Intolerant to BB therapy. Nitrates & CCB cause orthostatic hypotension   Continue Plavix and lipitor     PAF   No longer on DOAC      Essential hypertension  Mixed hyperlipidemia   On statin      PAD (peripheral artery disease) (HCC)   S/p left SFA stent 2021, Rt SFA 70% lesion asymptomatic. Manage medically.    S/p right SFA atherectomy and DCB PTA 10/2017.     S/p b/l iliac stents 2014.     s/p left SFA laser athetectomy and stent 2011   s/p left SFA angioplasty for ISR 2012.    s/p left SFA redo laser atherectomy and PTA 10/2012   S/p left SFA angioplasty 2013    Medical Decision Making:   I personally reviewed labs:  CBC, BMP  I personally reviewed imaging:  Toxic drug monitoring:   Discussed case with: , lead RN during today's interdisciplinary rounds.    Central Line:      Code status:  Prophylaxis: Lovenox    Subjective:     Chief

## 2024-04-14 NOTE — ED NOTES
TRANSFER - OUT REPORT:    Verbal report given to Maddie on Almaz Goyal  being transferred to 2152 for Routine progression of care       Report consisted of patient's Situation, Background, Assessment and   Recommendations(SBAR).     Information from the following report(s) Nurse Handoff Report, ED Encounter Summary, and ED SBAR was reviewed with the receiving nurse.    [place] Fall Assessment:    Presents to emergency department  because of falls (Syncope, seizure, or loss of consciousness): No  Age > 70: Yes  Altered Mental Status, Intoxication with alcohol or substance confusion (Disorientation, impaired judgment, poor safety awaremess, or inability to follow instructions): No  Impaired Mobility: Ambulates or transfers with assistive devices or assistance; Unable to ambulate or transer.: Yes  Nursing Judgement: Yes          Lines:   Peripheral IV 04/13/24 Right Antecubital (Active)       Peripheral IV 04/12/24 Distal;Left;Dorsal Forearm (Active)        Opportunity for questions and clarification was provided.      Patient transported with:  Monitor, O2 @ 2lpm, and Registered Nurse

## 2024-04-14 NOTE — PROGRESS NOTES
End of Shift Note    Bedside shift change report given to RN (oncoming nurse) by AMI MANLEY RN (offgoing nurse).  Report included the following information SBAR, Kardex, Intake/Output, and MAR    Shift worked:  7-7pm     Shift summary and any significant changes:     Currently on 2 L NC. Tachypnic during the shift. Continue to diurese. Soft BP.  Poor PO intake    Concerns for physician to address:       Zone phone for oncoming shift:            AMI MANLEY RN

## 2024-04-14 NOTE — PROGRESS NOTES
TRANSFER - IN REPORT:    Verbal report received from Maddie AGUILA on Almaz Goyal  being received from ED for routine progression of patient care      Report consisted of patient's Situation, Background, Assessment and   Recommendations(SBAR).     Information from the following report(s) Nurse Handoff Report was reviewed with the receiving nurse.    Opportunity for questions and clarification was provided.      Assessment completed upon patient's arrival to unit and care assumed.

## 2024-04-14 NOTE — H&P
Hospitalist Admission Note    NAME:   Alamz Goyal   : 1950   MRN: [id number]     Date/Time: 2024 12:09 AM    Patient PCP: Merline Flores APRN - NP    ______________________________________________________________________  Given the patient's current clinical presentation, I have a high level of concern for decompensation if discharged from the emergency department.  Complex decision making was performed, which includes reviewing the patient's available past medical records, laboratory results, and x-ray films.       My assessment of this patient's clinical condition and my plan of care is as follows.    Assessment / Plan:  Uncontrolled type I diabetes mellitus  Non anion gap metabolic acidosis  S/p regular insulin 5 units in ED  Will give lantus 10 units sc now  Start lispro sliding scale  If blood sugar continues to be uncontrolled- will consider starting insulin drip  Start insulin pump tomorrow am once  brings the device  Monitor labs      JESSICA on CKD  Non anion gap metabolic acidosis  Start iv fluids  Will monitor creatinine          Pneumonia:  S/p levofloxacin in ED  Continue levofloxacin  Will order procalcitonin  UA pending              Medical Decision Making:   I personally reviewed labs: cbc, CMP; elevated creatinine 1.9- baseline 1.07, low bicarbonate 20, AG 11, elevated lactic acid 5.81- improved to 4.7.  I personally reviewed imaging:CT head, CXR  I personally reviewed EKG:  Toxic drug monitoring: insulin and blood sugar monitoring.  Discussed case with: ED provider. After discussion I am in agreement that acuity of patient's medical condition necessitates hospital stay.      Code Status: full  DVT Prophylaxis:lovenox   Baseline:     Subjective:   CHIEF COMPLAINT: elevated blood sugar    HISTORY OF PRESENT ILLNESS:     Almaz Goyal is a 73 y.o.  female with PMHx significant for type I diabetes mellitus, CKD, hypertension, prior TIA presented to ED with c/o elevated blood sugar.  FL    MCH 31.3 26.0 - 34.0 PG    MCHC 32.3 30.0 - 36.5 g/dL    RDW 14.1 11.5 - 14.5 %    Platelets 183 150 - 400 K/uL    MPV 10.2 8.9 - 12.9 FL    Nucleated RBCs 0.0 0  WBC    nRBC 0.00 0.00 - 0.01 K/uL    Neutrophils % 83 (H) 32 - 75 %    Lymphocytes % 6 (L) 12 - 49 %    Monocytes % 6 5 - 13 %    Eosinophils % 3 0 - 7 %    Basophils % 0 0 - 1 %    Immature Granulocytes % 1 (H) 0.0 - 0.5 %    Neutrophils Absolute 7.7 1.8 - 8.0 K/UL    Lymphocytes Absolute 0.6 (L) 0.8 - 3.5 K/UL    Monocytes Absolute 0.6 0.0 - 1.0 K/UL    Eosinophils Absolute 0.3 0.0 - 0.4 K/UL    Basophils Absolute 0.0 0.0 - 0.1 K/UL    Immature Granulocytes Absolute 0.1 (H) 0.00 - 0.04 K/UL    Differential Type AUTOMATED     CMP    Collection Time: 04/13/24  7:15 PM   Result Value Ref Range    Sodium 139 136 - 145 mmol/L    Potassium 4.0 3.5 - 5.1 mmol/L    Chloride 108 97 - 108 mmol/L    CO2 20 (L) 21 - 32 mmol/L    Anion Gap 11 5 - 15 mmol/L    Glucose 347 (H) 65 - 100 mg/dL    BUN 31 (H) 6 - 20 MG/DL    Creatinine 1.91 (H) 0.55 - 1.02 MG/DL    Bun/Cre Ratio 16 12 - 20      Est, Glom Filt Rate 28 (L) >60 ml/min/1.73m2    Calcium 8.2 (L) 8.5 - 10.1 MG/DL    Total Bilirubin 0.7 0.2 - 1.0 MG/DL    ALT 18 12 - 78 U/L     (H) 15 - 37 U/L    Alk Phosphatase 87 45 - 117 U/L    Total Protein 5.9 (L) 6.4 - 8.2 g/dL    Albumin 2.8 (L) 3.5 - 5.0 g/dL    Globulin 3.1 2.0 - 4.0 g/dL    Albumin/Globulin Ratio 0.9 (L) 1.1 - 2.2     Magnesium    Collection Time: 04/13/24  7:15 PM   Result Value Ref Range    Magnesium 1.5 (L) 1.6 - 2.4 mg/dL   POC Lactic Acid    Collection Time: 04/13/24  7:19 PM   Result Value Ref Range    POC Lactic Acid 5.81 (HH) 0.40 - 2.00 mmol/L   EKG 12 Lead    Collection Time: 04/13/24  7:30 PM   Result Value Ref Range    Ventricular Rate 91 BPM    Atrial Rate 91 BPM    P-R Interval 190 ms    QRS Duration 80 ms    Q-T Interval 354 ms    QTc Calculation (Bazett) 435 ms    P Axis 69 degrees    R Axis 10 degrees    T Axis 103  degrees    Diagnosis       Normal sinus rhythm  Inferior infarct , age undetermined  Anteroseptal infarct , age undetermined  When compared with ECG of 08-MAY-2021 13:54,  Anteroseptal infarct is now present  Inferior infarct is now present  T wave inversion now evident in Lateral leads     Venous Blood Gas, POC    Collection Time: 04/13/24  7:53 PM   Result Value Ref Range    PH, VENOUS (POC) 7.25 (L) 7.32 - 7.42      PCO2, [place], POC 45.7 41 - 51 MMHG    PO2, VENOUS (POC) <27 25 - 40 mmHg    HCO3, Venous 20.1 (L) 23.0 - 28.0 MMOL/L    Base Deficit, Venous 7.1 mmol/L    Specimen type: VENOUS BLOOD      Performed by: Kiarra SANCHEZ    Lactic Acid    Collection Time: 04/13/24  7:58 PM   Result Value Ref Range    Lactic Acid, Plasma 4.7 (HH) 0.4 - 2.0 MMOL/L   POCT Glucose    Collection Time: 04/13/24  9:44 PM   Result Value Ref Range    POC Glucose 339 (H) 65 - 117 mg/dL    Performed by: WENDY SANCHEZ    POC Lactic Acid    Collection Time: 04/13/24  9:47 PM   Result Value Ref Range    POC Lactic Acid 2.90 (HH) 0.40 - 2.00 mmol/L   POCT Glucose    Collection Time: 04/13/24 11:48 PM   Result Value Ref Range    POC Glucose 344 (H) 65 - 117 mg/dL    Performed by: Dusty Perkins    POC Lactic Acid    Collection Time: 04/13/24 11:48 PM   Result Value Ref Range    POC Lactic Acid 2.36 (HH) 0.40 - 2.00 mmol/L         CT Head W/O Contrast    Result Date: 4/13/2024  EXAM: CT HEAD WO CONTRAST INDICATION: Hyperglycemia, change in mental status, hypertension COMPARISON: 2010. CONTRAST: None. TECHNIQUE: Unenhanced CT of the head was performed using 5 mm images. Brain and bone windows were generated. Coronal and sagittal reformats. CT dose reduction was achieved through use of a standardized protocol tailored for this examination and automatic exposure control for dose modulation.  FINDINGS: The ventricles and sulci are stable in size, shape and configuration. There is moderate patchy paraventricular white matter hypodensity

## 2024-04-15 ENCOUNTER — APPOINTMENT (OUTPATIENT)
Facility: HOSPITAL | Age: 74
End: 2024-04-15
Payer: MEDICARE

## 2024-04-15 PROBLEM — R93.89 ABNORMAL CHEST X-RAY: Status: ACTIVE | Noted: 2024-04-15

## 2024-04-15 PROBLEM — R73.9 HYPERGLYCEMIA: Status: ACTIVE | Noted: 2024-04-15

## 2024-04-15 LAB
ANION GAP SERPL CALC-SCNC: 11 MMOL/L (ref 5–15)
BACTERIA SPEC CULT: ABNORMAL
BASOPHILS # BLD: 0 K/UL (ref 0–0.1)
BASOPHILS NFR BLD: 0 % (ref 0–1)
BUN SERPL-MCNC: 50 MG/DL (ref 6–20)
BUN/CREAT SERPL: 25 (ref 12–20)
CALCIUM SERPL-MCNC: 9.5 MG/DL (ref 8.5–10.1)
CC UR VC: ABNORMAL
CHLORIDE SERPL-SCNC: 105 MMOL/L (ref 97–108)
CO2 SERPL-SCNC: 21 MMOL/L (ref 21–32)
CREAT SERPL-MCNC: 2 MG/DL (ref 0.55–1.02)
DIFFERENTIAL METHOD BLD: ABNORMAL
EOSINOPHIL # BLD: 0 K/UL (ref 0–0.4)
EOSINOPHIL NFR BLD: 0 % (ref 0–7)
ERYTHROCYTE [DISTWIDTH] IN BLOOD BY AUTOMATED COUNT: 14.4 % (ref 11.5–14.5)
GLUCOSE BLD STRIP.AUTO-MCNC: 170 MG/DL (ref 65–117)
GLUCOSE BLD STRIP.AUTO-MCNC: 241 MG/DL (ref 65–117)
GLUCOSE BLD STRIP.AUTO-MCNC: 265 MG/DL (ref 65–117)
GLUCOSE BLD STRIP.AUTO-MCNC: 312 MG/DL (ref 65–117)
GLUCOSE BLD STRIP.AUTO-MCNC: 417 MG/DL (ref 65–117)
GLUCOSE SERPL-MCNC: 179 MG/DL (ref 65–100)
HCT VFR BLD AUTO: 38.8 % (ref 35–47)
HGB BLD-MCNC: 12.8 G/DL (ref 11.5–16)
IMM GRANULOCYTES # BLD AUTO: 0.2 K/UL (ref 0–0.04)
IMM GRANULOCYTES NFR BLD AUTO: 1 % (ref 0–0.5)
INR PPP: 1.1 (ref 0.9–1.1)
LYMPHOCYTES # BLD: 0.4 K/UL (ref 0.8–3.5)
LYMPHOCYTES NFR BLD: 2 % (ref 12–49)
MAGNESIUM SERPL-MCNC: 1.8 MG/DL (ref 1.6–2.4)
MCH RBC QN AUTO: 31.3 PG (ref 26–34)
MCHC RBC AUTO-ENTMCNC: 33 G/DL (ref 30–36.5)
MCV RBC AUTO: 94.9 FL (ref 80–99)
MONOCYTES # BLD: 1.2 K/UL (ref 0–1)
MONOCYTES NFR BLD: 6 % (ref 5–13)
NEUTS SEG # BLD: 17.4 K/UL (ref 1.8–8)
NEUTS SEG NFR BLD: 91 % (ref 32–75)
NRBC # BLD: 0 K/UL (ref 0–0.01)
NRBC BLD-RTO: 0 PER 100 WBC
NT PRO BNP: ABNORMAL PG/ML
PHOSPHATE SERPL-MCNC: 5.1 MG/DL (ref 2.6–4.7)
PLATELET # BLD AUTO: 192 K/UL (ref 150–400)
PMV BLD AUTO: 10.5 FL (ref 8.9–12.9)
POTASSIUM SERPL-SCNC: 4.9 MMOL/L (ref 3.5–5.1)
PROTHROMBIN TIME: 11 SEC (ref 9–11.1)
RBC # BLD AUTO: 4.09 M/UL (ref 3.8–5.2)
RBC MORPH BLD: ABNORMAL
SERVICE CMNT-IMP: ABNORMAL
SODIUM SERPL-SCNC: 137 MMOL/L (ref 136–145)
WBC # BLD AUTO: 19.2 K/UL (ref 3.6–11)

## 2024-04-15 PROCEDURE — 99221 1ST HOSP IP/OBS SF/LOW 40: CPT

## 2024-04-15 PROCEDURE — 84100 ASSAY OF PHOSPHORUS: CPT

## 2024-04-15 PROCEDURE — 2700000000 HC OXYGEN THERAPY PER DAY

## 2024-04-15 PROCEDURE — 6360000002 HC RX W HCPCS: Performed by: INTERNAL MEDICINE

## 2024-04-15 PROCEDURE — 85610 PROTHROMBIN TIME: CPT

## 2024-04-15 PROCEDURE — 83880 ASSAY OF NATRIURETIC PEPTIDE: CPT

## 2024-04-15 PROCEDURE — 76770 US EXAM ABDO BACK WALL COMP: CPT

## 2024-04-15 PROCEDURE — 6370000000 HC RX 637 (ALT 250 FOR IP): Performed by: INTERNAL MEDICINE

## 2024-04-15 PROCEDURE — 85025 COMPLETE CBC W/AUTO DIFF WBC: CPT

## 2024-04-15 PROCEDURE — 36415 COLL VENOUS BLD VENIPUNCTURE: CPT

## 2024-04-15 PROCEDURE — 80048 BASIC METABOLIC PNL TOTAL CA: CPT

## 2024-04-15 PROCEDURE — 6370000000 HC RX 637 (ALT 250 FOR IP)

## 2024-04-15 PROCEDURE — 82962 GLUCOSE BLOOD TEST: CPT

## 2024-04-15 PROCEDURE — 2060000000 HC ICU INTERMEDIATE R&B

## 2024-04-15 PROCEDURE — 2580000003 HC RX 258: Performed by: INTERNAL MEDICINE

## 2024-04-15 PROCEDURE — 83735 ASSAY OF MAGNESIUM: CPT

## 2024-04-15 RX ORDER — INSULIN LISPRO 100 [IU]/ML
0-4 INJECTION, SOLUTION INTRAVENOUS; SUBCUTANEOUS NIGHTLY
Status: DISCONTINUED | OUTPATIENT
Start: 2024-04-15 | End: 2024-04-15

## 2024-04-15 RX ORDER — INSULIN LISPRO 100 [IU]/ML
10 INJECTION, SOLUTION INTRAVENOUS; SUBCUTANEOUS ONCE
Status: DISCONTINUED | OUTPATIENT
Start: 2024-04-15 | End: 2024-04-19 | Stop reason: HOSPADM

## 2024-04-15 RX ORDER — INSULIN LISPRO 100 [IU]/ML
0.05 INJECTION, SOLUTION INTRAVENOUS; SUBCUTANEOUS
Status: DISCONTINUED | OUTPATIENT
Start: 2024-04-15 | End: 2024-04-19 | Stop reason: HOSPADM

## 2024-04-15 RX ORDER — DEXTROSE MONOHYDRATE 100 MG/ML
INJECTION, SOLUTION INTRAVENOUS CONTINUOUS PRN
Status: DISCONTINUED | OUTPATIENT
Start: 2024-04-15 | End: 2024-04-19 | Stop reason: HOSPADM

## 2024-04-15 RX ORDER — INSULIN LISPRO 100 [IU]/ML
0-4 INJECTION, SOLUTION INTRAVENOUS; SUBCUTANEOUS
Status: DISCONTINUED | OUTPATIENT
Start: 2024-04-15 | End: 2024-04-15

## 2024-04-15 RX ORDER — INSULIN LISPRO 100 [IU]/ML
0-4 INJECTION, SOLUTION INTRAVENOUS; SUBCUTANEOUS NIGHTLY
Status: DISCONTINUED | OUTPATIENT
Start: 2024-04-15 | End: 2024-04-19 | Stop reason: HOSPADM

## 2024-04-15 RX ORDER — GLUCAGON 1 MG/ML
1 KIT INJECTION PRN
Status: DISCONTINUED | OUTPATIENT
Start: 2024-04-15 | End: 2024-04-19 | Stop reason: HOSPADM

## 2024-04-15 RX ORDER — INSULIN LISPRO 100 [IU]/ML
0-8 INJECTION, SOLUTION INTRAVENOUS; SUBCUTANEOUS
Status: DISCONTINUED | OUTPATIENT
Start: 2024-04-15 | End: 2024-04-19 | Stop reason: HOSPADM

## 2024-04-15 RX ADMIN — BUMETANIDE 1 MG: 0.25 INJECTION INTRAMUSCULAR; INTRAVENOUS at 08:52

## 2024-04-15 RX ADMIN — INSULIN HUMAN 7 UNITS: 100 INJECTION, SUSPENSION SUBCUTANEOUS at 21:33

## 2024-04-15 RX ADMIN — LEVOTHYROXINE SODIUM 50 MCG: 0.05 TABLET ORAL at 05:00

## 2024-04-15 RX ADMIN — SODIUM CHLORIDE, PRESERVATIVE FREE 10 ML: 5 INJECTION INTRAVENOUS at 08:52

## 2024-04-15 RX ADMIN — ENOXAPARIN SODIUM 30 MG: 100 INJECTION SUBCUTANEOUS at 08:51

## 2024-04-15 RX ADMIN — CLOPIDOGREL BISULFATE 75 MG: 75 TABLET ORAL at 08:52

## 2024-04-15 RX ADMIN — INSULIN LISPRO 10 UNITS: 100 INJECTION, SOLUTION INTRAVENOUS; SUBCUTANEOUS at 13:09

## 2024-04-15 RX ADMIN — ATORVASTATIN CALCIUM 20 MG: 20 TABLET, FILM COATED ORAL at 08:52

## 2024-04-15 RX ADMIN — INSULIN HUMAN 7 UNITS: 100 INJECTION, SUSPENSION SUBCUTANEOUS at 13:09

## 2024-04-15 RX ADMIN — INSULIN LISPRO 3 UNITS: 100 INJECTION, SOLUTION INTRAVENOUS; SUBCUTANEOUS at 16:52

## 2024-04-15 RX ADMIN — INSULIN LISPRO 3 UNITS: 100 INJECTION, SOLUTION INTRAVENOUS; SUBCUTANEOUS at 16:53

## 2024-04-15 RX ADMIN — Medication 2000 UNITS: at 08:52

## 2024-04-15 RX ADMIN — SODIUM CHLORIDE, PRESERVATIVE FREE 10 ML: 5 INJECTION INTRAVENOUS at 21:35

## 2024-04-15 RX ADMIN — LEVOFLOXACIN 750 MG: 750 TABLET, FILM COATED ORAL at 21:34

## 2024-04-15 NOTE — CARE COORDINATION
Advance Care Planning     General Advance Care Planning (ACP) Conversation    Date of Conversation: 4/15/2024  Conducted with: Patient with Decision Making Capacity    Healthcare Decision Maker:  No healthcare decision makers have been documented.  Click here to complete HealthCare Decision Makers including selection of the Healthcare Decision Maker Relationship (ie \"Primary\")   Today we documented Decision Maker(s) consistent with Legal Next of Kin hierarchy.    Content/Action Overview:  Has NO ACP documents-Information provided  Reviewed DNR/DNI and patient elects Full Code (Attempt Resuscitation)        Length of Voluntary ACP Conversation in minutes:  <16 minutes (Non-Billable)    [place]GLISH H HOSAY

## 2024-04-15 NOTE — CONSULTS
[facility]  [address] [place] [address]  [phone]  [email or web address]       CARDIOLOGY CONSULTATION       Date of  Admission: 4/13/2024  6:42 PM     Admission type:Emergency   Primary Care Physician:Merline Flores APRN - NP     Attending Provider: Beatrice Chaudhary MD  Cardiology Provider: [facility]/Dr. Cruz  CC/REASON FOR CONSULT: CHF     Subjective:     Almaz Goyal is a 73 y.o. female admitted for Hypomagnesemia [E83.42]  Hyperglycemia [R73.9]  Abnormal chest x-ray [R93.89]  JESSICA (acute kidney injury) (Formerly [facility]) [N17.9]  Lactic acidosis due to diabetes mellitus (Formerly [facility]) [E11.10].    The patient was seen and examined at the bedside.  The patient's significant other is at the bedside.  History is obtained primarily by chart review and the patient's .  She started experiencing nausea, vomiting and coughing on Saturday/3 days prior to presentation in the setting of blood sugar levels in the 400s.  Her insulin pump needle appears to be displaced.  She has a 60 years history of type 1 diabetes, recent hemoglobin A1c 6.2%.  Denies any chest pain, unclear if she has had any trouble breathing per se.  Patient is independent with her activities of daily living.      Patient Active Problem List    Diagnosis Date Noted    JESSICA (acute kidney injury) (Formerly [facility]) 04/13/2024    PVD (peripheral vascular disease) (Formerly [facility]) 06/04/2021    PAF (paroxysmal atrial fibrillation) (Formerly [facility]) 07/30/2020    Hypotension 12/23/2019    TAI (dyspnea on exertion) 11/18/2019    Dizziness 08/13/2015    Peripheral vascular angioplasty status 08/14/2013    Angina, class III (Formerly [facility]) 06/18/2013    Angina pectoris (Formerly [facility]) 06/07/2013    S/P coronary artery stent placement 10/05/2012    SOB (shortness of breath) 10/02/2012    Atherosclerosis of native artery of extremity with intermittent claudication (Formerly [facility]) 10/02/2012    DM type 1 (diabetes mellitus, type 1) (Formerly [facility]) 05/22/2012    Mixed  medical record. If you cannot access the medical record, please contact the sending organization for a detailed fax or copy.    · Left Ventricle: Normal cavity size, wall thickness and systolic function (ejection fraction normal). Estimated left ventricular ejection fraction is 56 - 60%. No regional wall motion abnormality noted. Mild (grade 1) left ventricular diastolic dysfunction.  · Aortic Valve: Mild aortic valve regurgitation is present.    Signed by: Juan J Xavier MD on 10/31/2019 10:50 AM, Signed by: Unknown Provider Result on 10/31/2019 12:00 AM      Stress Test:  05/25/21    NM STRESS TEST WITH MYOCARDIAL PERFUSION 05/27/2021  6:06 PM, 05/27/2021 12:00 AM (Final)    Narrative  This is a summary report. The complete report is available in the patient's medical record. If you cannot access the medical record, please contact the sending organization for a detailed fax or copy.    · Baseline ECG: Sinus rhythm.  · SPECT: Left ventricular function post-stress was normal. Calculated ejection fraction is 53%. There is no evidence of transient ischemic dilation (TID).  · SPECT: Myocardial perfusion imaging defect 1: There is a defect that is medium to large in size present in the inferolateral location(s) that is partially reversible. There is normal wall motion in the defect area. Viability in the area is good. The defect appears to probably be ischemia. Perfusion defect was visually and quantitatively present.  · SPECT: Left ventricular perfusion is abnormal. Myocardial perfusion imaging supports a high risk stress test.    Signed by: YVONNE VELIZ MD on 5/27/2021  6:06 PM, Signed by: Unknown Provider Result on 5/27/2021 12:00 AM     CT of the chest shows moderate bilateral pulmonary edema and pleural effusions       Assessment:   71-year-old patient with heart failure    Acute pulmonary edema: I personally reviewed chest CT films.  Noted pleural effusions.  Transthoracic echocardiogram is pending.  Noted

## 2024-04-15 NOTE — PROGRESS NOTES
very upset  that the insulin pump was removed.  He stated it would have been better to just disconnect the pump.  He felt it was much better controlled on the pump and that we are not giving enough medication.  I did inform Doctor Levy Quezada and informed of elevated sugar 417.  Put in orders for 10 units of humalog.

## 2024-04-15 NOTE — CARE COORDINATION
Transition of Care Plan:    RUR: 13%  Prior Level of Functioning: patient states independant  Disposition: TBD, tentatively home  If SNF or IPR: Date FOC offered:   Date FOC received:   Accepting facility:   Date authorization started with reference number:   Date authorization received and expires:   Follow up appointments: TBA  DME needed: has a walker  Transportation at discharge: TBA  IM/IMM Medicare/ letter given: sign at DC  Is patient a [place] and connected with [facility]? no   If yes, was [place] transfer form completed and VA notified?   Caregiver Contact:  Richard Goyal (Spouse)   [phone]   Discharge Caregiver contacted prior to discharge? yes  Care Conference needed?   Barriers to discharge: clinical improvement     04/15/24 1101   Service Assessment   Patient Orientation   (patient appears very weak, pale, tired. she was able to answer questions.)   Cognition Alert   History Provided By Patient   Primary Caregiver Self   Support Systems Spouse/Significant Other   Patient's Healthcare Decision Maker is: Legal Next of Kin   PCP Verified by CM Yes   Last Visit to PCP Within last 6 months   Prior Functional Level Independent in ADLs/IADLs   Current Functional Level Independent in ADLs/IADLs   Can patient return to prior living arrangement Yes   Ability to make needs known: Fair   Family able to assist with home care needs: Yes   Financial Resources Medicare   Social/Functional History   Lives With Spouse   Type of Home House   Home Layout One level   Home Access   (3 steps)   Home Equipment Walker, rolling   Receives Help From Family   ADL Assistance Independent   Homemaking Assistance Independent   Ambulation Assistance Independent   Transfer Assistance Independent   Occupation Retired   Discharge Planning   Type of Residence House   Living Arrangements Spouse/Significant Other   Potential Assistance Needed Home Care   Potential Assistance Purchasing Medications No   Services At/After Discharge

## 2024-04-15 NOTE — PLAN OF CARE
Problem: Discharge Planning  Goal: Discharge to home or other facility with appropriate resources  4/14/2024 2338 by Lynn Morlye, RN  Outcome: Progressing     Problem: Skin/Tissue Integrity  Goal: Absence of new skin breakdown  Description: 1.  Monitor for areas of redness and/or skin breakdown  2.  Assess vascular access sites hourly  3.  Every 4-6 hours minimum:  Change oxygen saturation probe site  4.  Every 4-6 hours:  If on nasal continuous positive airway pressure, respiratory therapy assess nares and determine need for appliance change or resting period.  4/15/2024 0958 by Sharri Morales RN  Outcome: Progressing  4/14/2024 2338 by Lynn Morley RN  Outcome: Progressing     Problem: Safety - Adult  Goal: Free from fall injury  4/15/2024 0958 by Sharri Morales RN  Outcome: Progressing  4/14/2024 2338 by Lynn Morley, RN  Outcome: Progressing

## 2024-04-15 NOTE — PLAN OF CARE
Problem: Discharge Planning  Goal: Discharge to home or other facility with appropriate resources  Outcome: Progressing     Problem: Skin/Tissue Integrity  Goal: Absence of new skin breakdown  Description: 1.  Monitor for areas of redness and/or skin breakdown  2.  Assess vascular access sites hourly  3.  Every 4-6 hours minimum:  Change oxygen saturation probe site  4.  Every 4-6 hours:  If on nasal continuous positive airway pressure, respiratory therapy assess nares and determine need for appliance change or resting period.  4/14/2024 2338 by Lynn Morley RN  Outcome: Progressing  4/14/2024 1025 by Maile Villarreal RN  Outcome: Progressing     Problem: Safety - Adult  Goal: Free from fall injury  4/14/2024 2338 by Lynn Morley RN  Outcome: Progressing  4/14/2024 1025 by Maile Villarreal, RN  Outcome: Progressing     Problem: Neurosensory - Adult  Goal: Achieves stable or improved neurological status  4/14/2024 1025 by Maile Villarreal, RN  Outcome: Progressing  Goal: Absence of seizures  4/14/2024 1025 by Maile Villarreal, RN  Outcome: Progressing

## 2024-04-15 NOTE — DIABETES MGMT
[facility]  DIABETES MANAGEMENT CONSULT    Consulted by Provider for advanced nursing evaluation and care for inpatient blood glucose management.    Evaluation and Action Plan   Almaz Goyal is a 73 year old female patient with usually well controlled diabetes. The patient uses an insulin ump ( Tandem), however her  is the one that operates and changes the set for her. The patient does not seem knowledgeable about insulin pump use. Today the patient is weak and does not feel well. The patient's  is not at the bedside. The patient;s BG's reading high and insulin pump alarming. The patient is unaware how to bolus on pump. I recommend the patient not use pump while hospitalized and veto resume at discharge. The patient must be able to use and operate insulin pump or her  must remain at the bedside to dos so in order to keep pump use while hospitalized. Neither of those things are in place. The patient is agreeable to removing pump. Insulin injections  basal/bolus will be uses instead.     Management Rationale Action Plan   Medication   Basal needs Using 0.2 units/kg/D based on weight  Using 7 units NPH Q 12 hours    Nutritional needs Using low dose  sensitivity based on weight  Using 3 units with each meal when 50 % or more is consumed   Corrective insulin Using medium dose  sensitivity based on BG trends    Additional orders          Diabetes Discharge Plan   Medication  TBD   Referral  []        Outpatient diabetes education   Additional orders            Initial Presentation   Almaz Goyal is a 73 y.o. female admitted  after experiencing hyperglycemia, vomiting and generalized un wellness. The patient reports insulin pump was not working properly.   LAB: Glucose 347. Creatine 1.91. GFR 28. A1c  6.2%.   CXR:Narrative & Impression  EXAM:  XR CHEST PORTABLE     INDICATION: hyperglycemia, screening      COMPARISON: 2021     TECHNIQUE: portable chest AP view at 1908      FINDINGS: The cardiac silhouette is normal. There is a diffuse increase in  interstitial markings, especially at the bases. No pleural effusion.        IMPRESSION:  Interstitial edema favored over interstitial pneumonia. Clinical correlation  needed.        CT:Narrative & Impression  EXAM: CT HEAD WO CONTRAST     INDICATION: Hyperglycemia, change in mental status, hypertension     COMPARISON: 2010.     CONTRAST: None.     TECHNIQUE: Unenhanced CT of the head was performed using 5 mm images. Brain and  bone windows were generated. Coronal and sagittal reformats. CT dose reduction  was achieved through use of a standardized protocol tailored for this  examination and automatic exposure control for dose modulation.       FINDINGS:  The ventricles and sulci are stable in size, shape and configuration. There is  moderate patchy paraventricular white matter hypodensity which has significantly  progressed as compared to 2010. Lacunar infarcts in both thalami, internal  capsules, and external capsules. Also in the midbrain.. There is no intracranial  hemorrhage, extra-axial collection, or mass effect. The basilar cisterns are  open. No CT evidence of acute infarct.     The bone windows demonstrate no abnormalities. The visualized portions of the  paranasal sinuses and mastoid air cells are clear.     IMPRESSION:  Central atrophy. Underlying white matter disease and multiple old lacunar  infarcts. No acute process identified by noncontrast CT.    HX:   Past Medical History:   Diagnosis Date    Arthritis     BILAT HANDS    CAD (coronary artery disease)     high cholesterol; heart murmur    Cancer (HCC)     uterine    Chronic kidney disease     Diabetes (HCC)     Endocrine disease     hypothyroidism    Hyperlipidemia     Hypertension     Long term current use of anticoagulant therapy     Murmur     Neurological disorder     TIA    Other ill-defined conditions(579.89)     PVD; benign right breast tumor removed    Peripheral

## 2024-04-16 ENCOUNTER — APPOINTMENT (OUTPATIENT)
Facility: HOSPITAL | Age: 74
End: 2024-04-16
Payer: MEDICARE

## 2024-04-16 ENCOUNTER — APPOINTMENT (OUTPATIENT)
Facility: HOSPITAL | Age: 74
End: 2024-04-16
Attending: INTERNAL MEDICINE
Payer: MEDICARE

## 2024-04-16 LAB
ALBUMIN SERPL-MCNC: 3.1 G/DL (ref 3.5–5)
ALBUMIN/GLOB SERPL: 0.9 (ref 1.1–2.2)
ALP SERPL-CCNC: 84 U/L (ref 45–117)
ALT SERPL-CCNC: 52 U/L (ref 12–78)
ANION GAP SERPL CALC-SCNC: 6 MMOL/L (ref 5–15)
AST SERPL-CCNC: 127 U/L (ref 15–37)
BASOPHILS # BLD: 0 K/UL (ref 0–0.1)
BASOPHILS NFR BLD: 0 % (ref 0–1)
BILIRUB SERPL-MCNC: 0.8 MG/DL (ref 0.2–1)
BUN SERPL-MCNC: 71 MG/DL (ref 6–20)
BUN/CREAT SERPL: 31 (ref 12–20)
CALCIUM SERPL-MCNC: 9.7 MG/DL (ref 8.5–10.1)
CHLORIDE SERPL-SCNC: 104 MMOL/L (ref 97–108)
CO2 SERPL-SCNC: 30 MMOL/L (ref 21–32)
CREAT SERPL-MCNC: 2.32 MG/DL (ref 0.55–1.02)
DIFFERENTIAL METHOD BLD: ABNORMAL
ECHO AR MAX VEL PISA: 2.3 M/S
ECHO AV AREA PEAK VELOCITY: 1.8 CM2
ECHO AV AREA VTI: 1.8 CM2
ECHO AV AREA/BSA PEAK VELOCITY: 1.1 CM2/M2
ECHO AV AREA/BSA VTI: 1.1 CM2/M2
ECHO AV MEAN GRADIENT: 3 MMHG
ECHO AV MEAN VELOCITY: 0.9 M/S
ECHO AV PEAK GRADIENT: 6 MMHG
ECHO AV PEAK VELOCITY: 1.2 M/S
ECHO AV REGURGITANT PHT: 245.7 MILLISECOND
ECHO AV VELOCITY RATIO: 0.75
ECHO AV VTI: 23.3 CM
ECHO BSA: 1.65 M2
ECHO EST RA PRESSURE: 15 MMHG
ECHO LA DIAMETER INDEX: 2.06 CM/M2
ECHO LA DIAMETER: 3.4 CM
ECHO LA VOL A-L A2C: 41 ML (ref 22–52)
ECHO LA VOL A-L A4C: 52 ML (ref 22–52)
ECHO LA VOL MOD A2C: 37 ML (ref 22–52)
ECHO LA VOL MOD A4C: 48 ML (ref 22–52)
ECHO LA VOLUME AREA LENGTH: 48 ML
ECHO LA VOLUME INDEX A-L A2C: 25 ML/M2 (ref 16–34)
ECHO LA VOLUME INDEX A-L A4C: 32 ML/M2 (ref 16–34)
ECHO LA VOLUME INDEX AREA LENGTH: 29 ML/M2 (ref 16–34)
ECHO LA VOLUME INDEX MOD A2C: 22 ML/M2 (ref 16–34)
ECHO LA VOLUME INDEX MOD A4C: 29 ML/M2 (ref 16–34)
ECHO LV E' LATERAL VELOCITY: 4 CM/S
ECHO LV E' SEPTAL VELOCITY: 3 CM/S
ECHO LV EDV A4C: 64 ML
ECHO LV EDV INDEX A4C: 39 ML/M2
ECHO LV EJECTION FRACTION A4C: 36 %
ECHO LV ESV A4C: 41 ML
ECHO LV ESV INDEX A4C: 25 ML/M2
ECHO LV FRACTIONAL SHORTENING: 8 % (ref 28–44)
ECHO LV INTERNAL DIMENSION DIASTOLE INDEX: 2.3 CM/M2
ECHO LV INTERNAL DIMENSION DIASTOLIC: 3.8 CM (ref 3.9–5.3)
ECHO LV INTERNAL DIMENSION SYSTOLIC INDEX: 2.12 CM/M2
ECHO LV INTERNAL DIMENSION SYSTOLIC: 3.5 CM
ECHO LV IVSD: 1.3 CM (ref 0.6–0.9)
ECHO LV MASS 2D: 117.3 G (ref 67–162)
ECHO LV MASS INDEX 2D: 71.1 G/M2 (ref 43–95)
ECHO LV POSTERIOR WALL DIASTOLIC: 0.7 CM (ref 0.6–0.9)
ECHO LV RELATIVE WALL THICKNESS RATIO: 0.37
ECHO LVOT AREA: 2.5 CM2
ECHO LVOT AV VTI INDEX: 0.71
ECHO LVOT DIAM: 1.8 CM
ECHO LVOT MEAN GRADIENT: 2 MMHG
ECHO LVOT PEAK GRADIENT: 3 MMHG
ECHO LVOT PEAK VELOCITY: 0.9 M/S
ECHO LVOT STROKE VOLUME INDEX: 25.6 ML/M2
ECHO LVOT SV: 42.2 ML
ECHO LVOT VTI: 16.6 CM
ECHO MV A VELOCITY: 0.89 M/S
ECHO MV AREA VTI: 1.9 CM2
ECHO MV E DECELERATION TIME (DT): 172.6 MS
ECHO MV E VELOCITY: 0.97 M/S
ECHO MV E/A RATIO: 1.09
ECHO MV E/E' LATERAL: 24.25
ECHO MV E/E' RATIO (AVERAGED): 28.29
ECHO MV LVOT VTI INDEX: 1.32
ECHO MV MAX VELOCITY: 1.2 M/S
ECHO MV MEAN GRADIENT: 4 MMHG
ECHO MV MEAN VELOCITY: 0.9 M/S
ECHO MV PEAK GRADIENT: 6 MMHG
ECHO MV VTI: 21.9 CM
ECHO RIGHT VENTRICULAR SYSTOLIC PRESSURE (RVSP): 63 MMHG
ECHO RV FREE WALL PEAK S': 10 CM/S
ECHO RV TAPSE: 2.5 CM (ref 1.7–?)
ECHO TV REGURGITANT MAX VELOCITY: 3.47 M/S
ECHO TV REGURGITANT PEAK GRADIENT: 48 MMHG
EOSINOPHIL # BLD: 0 K/UL (ref 0–0.4)
EOSINOPHIL NFR BLD: 0 % (ref 0–7)
ERYTHROCYTE [DISTWIDTH] IN BLOOD BY AUTOMATED COUNT: 14.5 % (ref 11.5–14.5)
GLOBULIN SER CALC-MCNC: 3.3 G/DL (ref 2–4)
GLUCOSE BLD STRIP.AUTO-MCNC: 131 MG/DL (ref 65–117)
GLUCOSE BLD STRIP.AUTO-MCNC: 134 MG/DL (ref 65–117)
GLUCOSE BLD STRIP.AUTO-MCNC: 204 MG/DL (ref 65–117)
GLUCOSE BLD STRIP.AUTO-MCNC: 285 MG/DL (ref 65–117)
GLUCOSE BLD STRIP.AUTO-MCNC: 59 MG/DL (ref 65–117)
GLUCOSE BLD STRIP.AUTO-MCNC: 59 MG/DL (ref 65–117)
GLUCOSE BLD STRIP.AUTO-MCNC: 76 MG/DL (ref 65–117)
GLUCOSE SERPL-MCNC: 93 MG/DL (ref 65–100)
HCT VFR BLD AUTO: 37.3 % (ref 35–47)
HGB BLD-MCNC: 12 G/DL (ref 11.5–16)
IMM GRANULOCYTES # BLD AUTO: 0.1 K/UL (ref 0–0.04)
IMM GRANULOCYTES NFR BLD AUTO: 0 % (ref 0–0.5)
INR PPP: 1.1 (ref 0.9–1.1)
LACTATE SERPL-SCNC: 2.3 MMOL/L (ref 0.4–2)
LYMPHOCYTES # BLD: 0.7 K/UL (ref 0.8–3.5)
LYMPHOCYTES NFR BLD: 6 % (ref 12–49)
MCH RBC QN AUTO: 30.5 PG (ref 26–34)
MCHC RBC AUTO-ENTMCNC: 32.2 G/DL (ref 30–36.5)
MCV RBC AUTO: 94.9 FL (ref 80–99)
MONOCYTES # BLD: 0.6 K/UL (ref 0–1)
MONOCYTES NFR BLD: 5 % (ref 5–13)
NEUTS SEG # BLD: 10.2 K/UL (ref 1.8–8)
NEUTS SEG NFR BLD: 88 % (ref 32–75)
NRBC # BLD: 0 K/UL (ref 0–0.01)
NRBC BLD-RTO: 0 PER 100 WBC
PLATELET # BLD AUTO: 160 K/UL (ref 150–400)
PMV BLD AUTO: 11 FL (ref 8.9–12.9)
POTASSIUM SERPL-SCNC: 3.8 MMOL/L (ref 3.5–5.1)
PROT SERPL-MCNC: 6.4 G/DL (ref 6.4–8.2)
PROTHROMBIN TIME: 11.1 SEC (ref 9–11.1)
RBC # BLD AUTO: 3.93 M/UL (ref 3.8–5.2)
SERVICE CMNT-IMP: ABNORMAL
SERVICE CMNT-IMP: NORMAL
SODIUM SERPL-SCNC: 140 MMOL/L (ref 136–145)
WBC # BLD AUTO: 11.7 K/UL (ref 3.6–11)

## 2024-04-16 PROCEDURE — 71045 X-RAY EXAM CHEST 1 VIEW: CPT

## 2024-04-16 PROCEDURE — 6360000002 HC RX W HCPCS: Performed by: INTERNAL MEDICINE

## 2024-04-16 PROCEDURE — 6370000000 HC RX 637 (ALT 250 FOR IP)

## 2024-04-16 PROCEDURE — 6370000000 HC RX 637 (ALT 250 FOR IP): Performed by: INTERNAL MEDICINE

## 2024-04-16 PROCEDURE — 83605 ASSAY OF LACTIC ACID: CPT

## 2024-04-16 PROCEDURE — 2580000003 HC RX 258: Performed by: INTERNAL MEDICINE

## 2024-04-16 PROCEDURE — 80053 COMPREHEN METABOLIC PANEL: CPT

## 2024-04-16 PROCEDURE — 93306 TTE W/DOPPLER COMPLETE: CPT

## 2024-04-16 PROCEDURE — 2580000003 HC RX 258

## 2024-04-16 PROCEDURE — 85610 PROTHROMBIN TIME: CPT

## 2024-04-16 PROCEDURE — 2700000000 HC OXYGEN THERAPY PER DAY

## 2024-04-16 PROCEDURE — 99231 SBSQ HOSP IP/OBS SF/LOW 25: CPT

## 2024-04-16 PROCEDURE — 85025 COMPLETE CBC W/AUTO DIFF WBC: CPT

## 2024-04-16 PROCEDURE — 6360000002 HC RX W HCPCS: Performed by: STUDENT IN AN ORGANIZED HEALTH CARE EDUCATION/TRAINING PROGRAM

## 2024-04-16 PROCEDURE — 2060000000 HC ICU INTERMEDIATE R&B

## 2024-04-16 PROCEDURE — 87040 BLOOD CULTURE FOR BACTERIA: CPT

## 2024-04-16 PROCEDURE — 36415 COLL VENOUS BLD VENIPUNCTURE: CPT

## 2024-04-16 PROCEDURE — P9047 ALBUMIN (HUMAN), 25%, 50ML: HCPCS | Performed by: INTERNAL MEDICINE

## 2024-04-16 PROCEDURE — 82962 GLUCOSE BLOOD TEST: CPT

## 2024-04-16 RX ORDER — 0.9 % SODIUM CHLORIDE 0.9 %
1000 INTRAVENOUS SOLUTION INTRAVENOUS ONCE
Status: COMPLETED | OUTPATIENT
Start: 2024-04-16 | End: 2024-04-16

## 2024-04-16 RX ORDER — MIDODRINE HYDROCHLORIDE 5 MG/1
5 TABLET ORAL
Status: DISCONTINUED | OUTPATIENT
Start: 2024-04-16 | End: 2024-04-19 | Stop reason: HOSPADM

## 2024-04-16 RX ORDER — ALBUMIN (HUMAN) 12.5 G/50ML
25 SOLUTION INTRAVENOUS ONCE
Status: COMPLETED | OUTPATIENT
Start: 2024-04-16 | End: 2024-04-16

## 2024-04-16 RX ORDER — METRONIDAZOLE 500 MG/100ML
500 INJECTION, SOLUTION INTRAVENOUS EVERY 8 HOURS
Status: DISCONTINUED | OUTPATIENT
Start: 2024-04-16 | End: 2024-04-19 | Stop reason: HOSPADM

## 2024-04-16 RX ORDER — METRONIDAZOLE 500 MG/100ML
500 INJECTION, SOLUTION INTRAVENOUS EVERY 12 HOURS
Status: DISCONTINUED | OUTPATIENT
Start: 2024-04-16 | End: 2024-04-16

## 2024-04-16 RX ORDER — MILRINONE LACTATE 0.2 MG/ML
0.06 INJECTION, SOLUTION INTRAVENOUS CONTINUOUS
Status: DISCONTINUED | OUTPATIENT
Start: 2024-04-16 | End: 2024-04-17

## 2024-04-16 RX ORDER — NOREPINEPHRINE BITARTRATE 0.06 MG/ML
1-100 INJECTION, SOLUTION INTRAVENOUS CONTINUOUS
Status: DISCONTINUED | OUTPATIENT
Start: 2024-04-16 | End: 2024-04-17

## 2024-04-16 RX ADMIN — MIDODRINE HYDROCHLORIDE 5 MG: 5 TABLET ORAL at 18:14

## 2024-04-16 RX ADMIN — LEVOTHYROXINE SODIUM 50 MCG: 0.05 TABLET ORAL at 06:56

## 2024-04-16 RX ADMIN — MIDODRINE HYDROCHLORIDE 5 MG: 5 TABLET ORAL at 14:38

## 2024-04-16 RX ADMIN — ALBUMIN (HUMAN) 25 G: 0.25 INJECTION, SOLUTION INTRAVENOUS at 13:00

## 2024-04-16 RX ADMIN — INSULIN LISPRO 3 UNITS: 100 INJECTION, SOLUTION INTRAVENOUS; SUBCUTANEOUS at 08:30

## 2024-04-16 RX ADMIN — METRONIDAZOLE 500 MG: 500 INJECTION, SOLUTION INTRAVENOUS at 14:44

## 2024-04-16 RX ADMIN — ENOXAPARIN SODIUM 30 MG: 100 INJECTION SUBCUTANEOUS at 09:29

## 2024-04-16 RX ADMIN — CLOPIDOGREL BISULFATE 75 MG: 75 TABLET ORAL at 09:29

## 2024-04-16 RX ADMIN — INSULIN HUMAN 5 UNITS: 100 INJECTION, SUSPENSION SUBCUTANEOUS at 20:40

## 2024-04-16 RX ADMIN — MILRINONE LACTATE 0.06 MCG/KG/MIN: 0.2 INJECTION, SOLUTION INTRAVENOUS at 19:36

## 2024-04-16 RX ADMIN — METRONIDAZOLE 500 MG: 500 INJECTION, SOLUTION INTRAVENOUS at 20:42

## 2024-04-16 RX ADMIN — DEXTROSE MONOHYDRATE: 100 INJECTION, SOLUTION INTRAVENOUS at 12:40

## 2024-04-16 RX ADMIN — SODIUM CHLORIDE, PRESERVATIVE FREE 10 ML: 5 INJECTION INTRAVENOUS at 09:33

## 2024-04-16 RX ADMIN — SODIUM CHLORIDE, PRESERVATIVE FREE 10 ML: 5 INJECTION INTRAVENOUS at 20:45

## 2024-04-16 RX ADMIN — SODIUM CHLORIDE 1000 ML: 9 INJECTION, SOLUTION INTRAVENOUS at 14:27

## 2024-04-16 RX ADMIN — SODIUM CHLORIDE 25 ML/HR: 9 INJECTION, SOLUTION INTRAVENOUS at 15:50

## 2024-04-16 RX ADMIN — BUMETANIDE 1 MG: 0.25 INJECTION INTRAMUSCULAR; INTRAVENOUS at 18:10

## 2024-04-16 RX ADMIN — VANCOMYCIN HYDROCHLORIDE 1250 MG: 10 INJECTION, POWDER, LYOPHILIZED, FOR SOLUTION INTRAVENOUS at 15:51

## 2024-04-16 RX ADMIN — ATORVASTATIN CALCIUM 20 MG: 20 TABLET, FILM COATED ORAL at 09:00

## 2024-04-16 RX ADMIN — INSULIN HUMAN 7 UNITS: 100 INJECTION, SUSPENSION SUBCUTANEOUS at 08:30

## 2024-04-16 NOTE — DIABETES MGMT
[facility]  DIABETES MANAGEMENT CONSULT    Consulted by Provider for advanced nursing evaluation and care for inpatient blood glucose management.    Evaluation and Action Plan   Almaz Goyal is a 73 year old female patient with usually well controlled diabetes. The patient uses an insulin ump ( Tandem), however her  is the one that operates and changes the set for her. The patient does not seem knowledgeable about insulin pump use. Today the patient is weak and does not feel well. The patient's  is not at the bedside. The patient;s BG's reading high and insulin pump alarming. The patient is unaware how to bolus on pump. I recommend the patient not use pump while hospitalized and veto resume at discharge. The patient must be able to use and operate insulin pump or her  must remain at the bedside to dos so in order to keep pump use while hospitalized. Neither of those things are in place. The patient is agreeable to removing pump. Insulin injections  basal/bolus will be uses instead.   FBG was stable this morning This afternoon the patient has a low BG. She has scheduled meal time , however I suspect she did not eat much. I will hold meal time insulin for now, until the patient is consuming greater than 50 % of meals.   Update: Spoke with the patient and patient's spouse on the policy regarding insulin pump. Since the patient is unable to operate on her own and the patient's  will not be staying with the patient through the night, it has been agreed that the insulin pump will remain off until discharge. The patient will be given basal/bolus insulin injections. The patient will require basal insulin since she has Type 1 diabetes ( dosing may require reductions at times).     Management Rationale Action Plan   Medication   Basal needs Using 0.17 units/kg/D based on weight  Using 5 units NPH Q 12 hours    Nutritional needs Using low dose  sensitivity based on  weight  Using 3 units with each meal when 50 % or more is consumed; hold for now   Corrective insulin Using medium dose  sensitivity based on BG trends    Additional orders          Diabetes Discharge Plan   Medication  TBD   Referral  []        Outpatient diabetes education   Additional orders            Initial Presentation   Almaz Goyal is a 73 y.o. female admitted  after experiencing hyperglycemia, vomiting and generalized un wellness. The patient reports insulin pump was not working properly.   LAB: Glucose 347. Creatine 1.91. GFR 28. A1c  6.2%.   CXR:Narrative & Impression  EXAM:  XR CHEST PORTABLE     INDICATION: hyperglycemia, screening      COMPARISON: 2021     TECHNIQUE: portable chest AP view at 1908     FINDINGS: The cardiac silhouette is normal. There is a diffuse increase in  interstitial markings, especially at the bases. No pleural effusion.        IMPRESSION:  Interstitial edema favored over interstitial pneumonia. Clinical correlation  needed.        CT:Narrative & Impression  EXAM: CT HEAD WO CONTRAST     INDICATION: Hyperglycemia, change in mental status, hypertension     COMPARISON: 2010.     CONTRAST: None.     TECHNIQUE: Unenhanced CT of the head was performed using 5 mm images. Brain and  bone windows were generated. Coronal and sagittal reformats. CT dose reduction  was achieved through use of a standardized protocol tailored for this  examination and automatic exposure control for dose modulation.       FINDINGS:  The ventricles and sulci are stable in size, shape and configuration. There is  moderate patchy paraventricular white matter hypodensity which has significantly  progressed as compared to 2010. Lacunar infarcts in both thalami, internal  capsules, and external capsules. Also in the midbrain.. There is no intracranial  hemorrhage, extra-axial collection, or mass effect. The basilar cisterns are  open. No CT evidence of acute infarct.     The bone windows demonstrate no

## 2024-04-16 NOTE — PROGRESS NOTES
Hospitalist Progress Note    NAME: Almaz Goyal   : 1950   MRN: [id number]     Patient PCP: Merline Flores APRN - NP    Assessment / Plan:     ?  Septic shock versus cardiogenic shock  Patient systolic blood pressure dropped down into the 70s, albumin ordered, repeat labs  Antibiotics broadened to include vancomycin and Flagyl in addition to Levaquin  Started on midodrine  Intensivist evaluated the patient, was giving additional IV fluid boluses  Lactic acid around 2.3  After extensive discussion between intensivist and family, patient decided to switch her CODE STATUS to DNR and plan was made not to escalate to ICU per intensivist note.  Therefore patient remained in the stepdown unit  Patient started on Levophed, also was evaluated by cardiology who started her on inotrope agent  Patient is high risk of further deterioration  Palliative care consulted    Severe hypoglycemia with blood sugar in the 50s  Uncontrolled type I diabetes mellitus  Non anion gap metabolic acidosis   noted that the SQ needle was dislodged and so patient likely has not been getting her insulin the last couple of days PTA  S/p regular insulin 5 units in ED; lantus 10 units x 1   Patient was on insulin pump up to yesterday, discussed with diabetic management team, plan is to hold the insulin pump as patient is not able to manage  And continue with subcutaneous insulin.  Mealtime insulin held as blood sugar was in the 50s    Acute pulmonary edema  Hypoxemia  -noted on CT chest  -NTproBNP 1690   -s/p NS bolus in ED   Patient is currently on IV Bumex along with pressors  -ECHO showed EF of 35 to 40%  -needing 2LNC, try wean off as we diurese      JESSICA vs CKD  Non anion gap metabolic acidosis  -no recent baseline  -stopping IVF due to pulm edema  -follow Cr, she maybe near baseline.  Need UA      Atypical Pneumonia?  -get UA, will need straight cath - discussed with nursing  -PCT 6.3.  Continue Levaquin  -repeat Procal  -wean

## 2024-04-16 NOTE — PROGRESS NOTES
Echo completed today. Reduced left ventricle ejection fraction with elevated central venous pressures. These findings in the setting of worsening renal function suggest cardiorenal syndrome. Recommend initiation of inotropic therapy (milrinone 0.06 mcg/kg/min) for supporting cardiac output and diuresis cautiously.     Regional wall motion abnormalities suggest underlying coronary artery disease, will need optimization of fluid status prior to left heart

## 2024-04-16 NOTE — PROGRESS NOTES
Pharmacy Antimicrobial Kinetic Dosing    Indication for Antimicrobials: Sepsis/CAP     Current Regimen of Each Antimicrobial:  Vancomycin pharmacy to dose; Start Date ; Day 1  Levofloxacin 750 mg IV q48h; Start Date ; Day 4  Metronidazole 500 mg IV q8h; Start Date ; Day 1    Previous Antimicrobial Therapy:       Goal Level: Vancomycin -600    Date Dose & Interval Measured (mcg/mL) Predicted AUC                       Significant Cultures:    blood: NGTD   urine: GNRs    Labs:  Recent Labs     Units 04/15/24  0526 04/15/24  0445 24  0421 24  1915   CREATININE MG/DL  --  2.00* 1.81* 1.91*   BUN MG/DL  --  50* 40* 31*   PROCAL ng/mL  --   --  6.38  --    WBC K/uL 19.2*  --  13.1* 9.3     Temp (24hrs), Av.9 °F (36.6 °C), Min:97.5 °F (36.4 °C), Max:98.2 °F (36.8 °C)      Conditions for Dosing Consideration: None    Creatinine Clearance (mL/min): Estimated Creatinine Clearance: 23 mL/min (A) (based on SCr of 2 mg/dL (H)).       Impression/Plan:   Patient in JESSICA  Give vancomycin 1250 mg loading dose, then dose by level  Vancomycin level  with AM labs  Metronidazole change to 500 mg q8h per protocol  Continue levofloxacin  Antimicrobial stop date TBD     Pharmacy will follow daily and adjust medications as appropriate for renal function and/or serum levels.    Thank you,  Carlos Styles [facility]

## 2024-04-16 NOTE — PROGRESS NOTES
Hospitalist Progress Note    NAME: Almaz Goyal   : 1950   MRN: [id number]     Patient PCP: Merline Flores APRN - NP    Assessment / Plan:       Uncontrolled type I diabetes mellitus  Non anion gap metabolic acidosis   noted that the SQ needle was dislodged and so patient likely has not been getting her insulin the last couple of days PTA  S/p regular insulin 5 units in ED; lantus 10 units x 1   Restarting insulin pump on  - basal and auto bolus --.On 4/15 insulin pump removed with out orders!! Will assess hospital policy re inuslin pump in demented pts. D/w . DM mngt consulted   They follow with Dr Michelle as OP    Acute pulmonary edema  Hypoxemia  -noted on CT chest  -NTproBNP 1690   -s/p NS bolus in ED and received IVFs since admission - stopping  -cardiology held bumex  -ECHO pending   -needing 2LNC, try wean off as we diurese      JESSICA vs CKD  Non anion gap metabolic acidosis  -no recent baseline  -stopping IVF due to pulm edema  -follow Cr, she maybe near baseline.  Need UA      Atypical Pneumonia?  -get UA, will need straight cath - discussed with nursing  -PCT 6.3.  Continue Levaquin  -repeat Procal  -wean off O2     CAD,    Hx of SHANNAN to LCx and RCA .   S/p LCX SHANNAN PCI 2021.    Intolerant to BB therapy. Nitrates & CCB cause orthostatic hypotension   Continue Plavix and lipitor     PAF   No longer on DOAC      Essential hypertension  Mixed hyperlipidemia   On statin      PAD (peripheral artery disease) (HCC)   S/p left SFA stent 2021, Rt SFA 70% lesion asymptomatic. Manage medically.    S/p right SFA atherectomy and DCB PTA 10/2017.     S/p b/l iliac stents 2014.     s/p left SFA laser athetectomy and stent 2011   s/p left SFA angioplasty for ISR 2012.    s/p left SFA redo laser atherectomy and PTA 10/2012   S/p left SFA angioplasty 2013    Medical Decision Making:   I personally reviewed labs:  CBC, BMP  I personally reviewed imaging:  Toxic drug monitoring:  during multidisciplinary rounds if indicated above   ________________________________________________________________________  Beatrice Chaudhary MD     Procedures: see electronic medical records for all procedures/Xrays and details which were not copied into this note but were reviewed prior to creation of Plan.      LABS:  I reviewed today's most current labs and imaging studies.  Pertinent labs include:  Recent Labs     04/13/24  1915 04/14/24  0421 04/15/24  0526   WBC 9.3 13.1* 19.2*   HGB 12.0 12.7 12.8   HCT 37.1 39.3 38.8    176 192       Recent Labs     04/13/24 1915 04/13/24  2355 04/14/24  0421 04/15/24  0445 04/16/24  0330     --  131* 137  --    K 4.0  --  4.7 4.9  --      --  105 105  --    CO2 20*  --  19* 21  --    BUN 31*  --  40* 50*  --    MG 1.5*  --   --  1.8  --    PHOS  --   --   --  5.1*  --    ALT 18  --   --   --   --    INR  --    < > 1.1 1.1 1.1    < > = values in this interval not displayed.

## 2024-04-16 NOTE — CONSULTS
Pulmonary and Critical Care  Consult Note    Requesting Provider: Dr. Dickinson    Reason for Consult: Hypotension    HPI: The patient is a 73/F with medical history as below who we are seeing in consultation at the request of Dr. Dickinson for evaluation of hypotension.     Briefly, patient was admitted due to DKA. While she was being treated, she was noted to have low BP. ICU was consulted for evaluation. It is worth noting that on my arrival, her MAP was 49 but she was mentating normally and gave me the entire history. She was also sitting up in the bed. She denies any cough, fever, chest pain or dyspnea.     During my interview, patient stated that she wants to go home and does not want to go to ICU. We discussed the reasons to go to ICU and the plan of treatment including the vasopressor use. Patient rightly said why does she need pressor when she has no symptoms? She went on to say that she values comfort more than all these intervention and would like to be at home and comfortable. I brought on the idea of palliative care to which she was delighted and said \"I love you\".  was at the bedside and agreed with the wife.     She also requested DNR status.     Review of Systems: All other systems have been reviewed and are negative except per HPI    Past Medical History:  Past Medical History:   Diagnosis Date    Arthritis     BILAT HANDS    CAD (coronary artery disease)     high cholesterol; heart murmur    Cancer (HCC)     uterine    Chronic kidney disease     Diabetes (HCC)     Endocrine disease     hypothyroidism    Hyperlipidemia     Hypertension     Long term current use of anticoagulant therapy     Murmur     Neurological disorder     TIA    Other ill-defined conditions(799.89)     PVD; benign right breast tumor removed    Peripheral vascular angioplasty status 8/14/2013 8/14/13 s/p angioplasty L RFA    Stroke (HCC)     TIA  X3 NO RESIDUAL    Thyroid disease     HYPOTHYROID       Social History:    both kidneys consistent with the clinical history   of medical renal parenchymal disease. No hydronephrosis. Normal urinary bladder.      CT CHEST WO CONTRAST   Final Result      1. There is moderate bilateral pulmonary edema and pleural effusions. These   findings are most likely related to congestive heart failure. There is some   dilatation of the left ventricle and overall thinning of the wall of the left   ventricle.   2. Fairly severe atherosclerotic change as described above especially involving   the coronary arteries.      CT Head W/O Contrast   Final Result   Central atrophy. Underlying white matter disease and multiple old lacunar   infarcts. No acute process identified by noncontrast CT.            XR CHEST PORTABLE   Final Result   Interstitial edema favored over interstitial pneumonia. Clinical correlation   needed.             Assessment     The patient is a 73/F with medical history as below who we are seeing in consultation at the request of Dr. Dickinson for evaluation of hypotension.     Hypotension - while hypotension technically could be defined as BP less than 90/60 or MAP less than 65, these number are arbitrary and not scientific in my opinion as the BP has to be individualized for each patients. If the patient is asymptomatic and there is no evidence of end organ damage then we should not be strictly relying on a fixed number. When I arrived at the bedside, patient had a MAP of 49 and had normal mentation, the periphery was warm.  Patient is currently getting IVF as she appears dry on exam (although CT scan of chest had shows pleural effusion and congestion). I agree with hydration, she is also getting ECHO, results of which are pending.     During my interview, I explained the patient about her \"supposedly low BP, reluctantly (since she was asymptomatic)\" and the reasons to go to ICU and the plan of treatment including the vasopressor use. Patient rightly said why does she need pressor when

## 2024-04-17 ENCOUNTER — APPOINTMENT (OUTPATIENT)
Facility: HOSPITAL | Age: 74
End: 2024-04-17
Payer: MEDICARE

## 2024-04-17 PROBLEM — Z51.5 PALLIATIVE CARE BY SPECIALIST: Status: ACTIVE | Noted: 2024-04-17

## 2024-04-17 PROBLEM — R57.0 CARDIOGENIC SHOCK (HCC): Status: ACTIVE | Noted: 2024-04-17

## 2024-04-17 PROBLEM — Z71.89 GOALS OF CARE, COUNSELING/DISCUSSION: Status: ACTIVE | Noted: 2024-04-17

## 2024-04-17 PROBLEM — Z71.89 DNR (DO NOT RESUSCITATE) DISCUSSION: Status: ACTIVE | Noted: 2024-04-17

## 2024-04-17 LAB
ANION GAP SERPL CALC-SCNC: 4 MMOL/L (ref 5–15)
BUN SERPL-MCNC: 65 MG/DL (ref 6–20)
BUN/CREAT SERPL: 34 (ref 12–20)
CALCIUM SERPL-MCNC: 9.3 MG/DL (ref 8.5–10.1)
CHLORIDE SERPL-SCNC: 106 MMOL/L (ref 97–108)
CO2 SERPL-SCNC: 29 MMOL/L (ref 21–32)
CREAT SERPL-MCNC: 1.93 MG/DL (ref 0.55–1.02)
GLUCOSE BLD STRIP.AUTO-MCNC: 167 MG/DL (ref 65–117)
GLUCOSE BLD STRIP.AUTO-MCNC: 176 MG/DL (ref 65–117)
GLUCOSE BLD STRIP.AUTO-MCNC: 260 MG/DL (ref 65–117)
GLUCOSE BLD STRIP.AUTO-MCNC: 375 MG/DL (ref 65–117)
GLUCOSE SERPL-MCNC: 181 MG/DL (ref 65–100)
POTASSIUM SERPL-SCNC: 3.9 MMOL/L (ref 3.5–5.1)
SERVICE CMNT-IMP: ABNORMAL
SODIUM SERPL-SCNC: 139 MMOL/L (ref 136–145)
VANCOMYCIN SERPL-MCNC: 15.3 UG/ML

## 2024-04-17 PROCEDURE — 2580000003 HC RX 258: Performed by: INTERNAL MEDICINE

## 2024-04-17 PROCEDURE — 82962 GLUCOSE BLOOD TEST: CPT

## 2024-04-17 PROCEDURE — 6370000000 HC RX 637 (ALT 250 FOR IP)

## 2024-04-17 PROCEDURE — 6370000000 HC RX 637 (ALT 250 FOR IP): Performed by: INTERNAL MEDICINE

## 2024-04-17 PROCEDURE — 6360000002 HC RX W HCPCS: Performed by: INTERNAL MEDICINE

## 2024-04-17 PROCEDURE — 99497 ADVNCD CARE PLAN 30 MIN: CPT | Performed by: NURSE PRACTITIONER

## 2024-04-17 PROCEDURE — 71045 X-RAY EXAM CHEST 1 VIEW: CPT

## 2024-04-17 PROCEDURE — 99222 1ST HOSP IP/OBS MODERATE 55: CPT | Performed by: NURSE PRACTITIONER

## 2024-04-17 PROCEDURE — 99231 SBSQ HOSP IP/OBS SF/LOW 25: CPT

## 2024-04-17 PROCEDURE — 36415 COLL VENOUS BLD VENIPUNCTURE: CPT

## 2024-04-17 PROCEDURE — 80202 ASSAY OF VANCOMYCIN: CPT

## 2024-04-17 PROCEDURE — 2060000000 HC ICU INTERMEDIATE R&B

## 2024-04-17 PROCEDURE — 80048 BASIC METABOLIC PNL TOTAL CA: CPT

## 2024-04-17 RX ORDER — IPRATROPIUM BROMIDE AND ALBUTEROL SULFATE 2.5; .5 MG/3ML; MG/3ML
1 SOLUTION RESPIRATORY (INHALATION) EVERY 4 HOURS PRN
Status: DISCONTINUED | OUTPATIENT
Start: 2024-04-17 | End: 2024-04-19 | Stop reason: HOSPADM

## 2024-04-17 RX ORDER — MILRINONE LACTATE 0.2 MG/ML
0.12 INJECTION, SOLUTION INTRAVENOUS CONTINUOUS
Status: DISCONTINUED | OUTPATIENT
Start: 2024-04-17 | End: 2024-04-18

## 2024-04-17 RX ORDER — INSULIN LISPRO 100 [IU]/ML
2 INJECTION, SOLUTION INTRAVENOUS; SUBCUTANEOUS ONCE
Status: COMPLETED | OUTPATIENT
Start: 2024-04-17 | End: 2024-04-17

## 2024-04-17 RX ADMIN — INSULIN HUMAN 5 UNITS: 100 INJECTION, SUSPENSION SUBCUTANEOUS at 08:30

## 2024-04-17 RX ADMIN — INSULIN LISPRO 2 UNITS: 100 INJECTION, SOLUTION INTRAVENOUS; SUBCUTANEOUS at 17:15

## 2024-04-17 RX ADMIN — ENOXAPARIN SODIUM 30 MG: 100 INJECTION SUBCUTANEOUS at 08:31

## 2024-04-17 RX ADMIN — BUMETANIDE 1 MG: 0.25 INJECTION INTRAMUSCULAR; INTRAVENOUS at 08:31

## 2024-04-17 RX ADMIN — METRONIDAZOLE 500 MG: 500 INJECTION, SOLUTION INTRAVENOUS at 21:09

## 2024-04-17 RX ADMIN — INSULIN HUMAN 5 UNITS: 100 INJECTION, SUSPENSION SUBCUTANEOUS at 21:04

## 2024-04-17 RX ADMIN — LEVOFLOXACIN 750 MG: 750 TABLET, FILM COATED ORAL at 08:39

## 2024-04-17 RX ADMIN — MIDODRINE HYDROCHLORIDE 5 MG: 5 TABLET ORAL at 12:36

## 2024-04-17 RX ADMIN — INSULIN LISPRO 8 UNITS: 100 INJECTION, SOLUTION INTRAVENOUS; SUBCUTANEOUS at 17:15

## 2024-04-17 RX ADMIN — LEVOTHYROXINE SODIUM 50 MCG: 0.05 TABLET ORAL at 05:43

## 2024-04-17 RX ADMIN — SODIUM CHLORIDE, PRESERVATIVE FREE 10 ML: 5 INJECTION INTRAVENOUS at 21:09

## 2024-04-17 RX ADMIN — Medication 2000 UNITS: at 08:31

## 2024-04-17 RX ADMIN — CLOPIDOGREL BISULFATE 75 MG: 75 TABLET ORAL at 08:31

## 2024-04-17 RX ADMIN — ATORVASTATIN CALCIUM 20 MG: 20 TABLET, FILM COATED ORAL at 08:31

## 2024-04-17 RX ADMIN — VANCOMYCIN HYDROCHLORIDE 500 MG: 500 INJECTION, POWDER, LYOPHILIZED, FOR SOLUTION INTRAVENOUS at 05:38

## 2024-04-17 RX ADMIN — METRONIDAZOLE 500 MG: 500 INJECTION, SOLUTION INTRAVENOUS at 12:35

## 2024-04-17 RX ADMIN — MIDODRINE HYDROCHLORIDE 5 MG: 5 TABLET ORAL at 08:37

## 2024-04-17 RX ADMIN — BUMETANIDE 1 MG: 0.25 INJECTION INTRAMUSCULAR; INTRAVENOUS at 17:17

## 2024-04-17 RX ADMIN — SODIUM CHLORIDE, PRESERVATIVE FREE 10 ML: 5 INJECTION INTRAVENOUS at 08:37

## 2024-04-17 RX ADMIN — MIDODRINE HYDROCHLORIDE 5 MG: 5 TABLET ORAL at 17:17

## 2024-04-17 RX ADMIN — SODIUM CHLORIDE: 9 INJECTION, SOLUTION INTRAVENOUS at 12:33

## 2024-04-17 RX ADMIN — ONDANSETRON 4 MG: 2 INJECTION INTRAMUSCULAR; INTRAVENOUS at 08:58

## 2024-04-17 RX ADMIN — METRONIDAZOLE 500 MG: 500 INJECTION, SOLUTION INTRAVENOUS at 04:09

## 2024-04-17 NOTE — PROGRESS NOTES
Hospitalist Progress Note    NAME: Almaz Goyal   : 1950   MRN: [id number]     Patient PCP: Merline Flores APRN - NP    Assessment / Plan:     ?  Septic shock versus cardiogenic shock  Patient systolic blood pressure dropped down into the 70s, albumin ordered, repeat labs  Antibiotics broadened to include vancomycin and Flagyl in addition to Levaquin  Started on midodrine  Intensivist evaluated the patient, was giving additional IV fluid boluses  Lactic acid around 2.3  After extensive discussion between intensivist and family, patient decided to switch her CODE STATUS to DNR and plan was made not to escalate to ICU per intensivist note.  Therefore patient remained in the stepdown unit  Patient started on Levophed, also was evaluated by cardiology who started her on inotrope agent  Patient is high risk of further deterioration  Palliative care consulted    , blood pressure stable overnight, continue midodrine.    Severe hypoglycemia with blood sugar in the 50s  Uncontrolled type I diabetes mellitus  Non anion gap metabolic acidosis   noted that the SQ needle was dislodged and so patient likely has not been getting her insulin the last couple of days PTA  S/p regular insulin 5 units in ED; lantus 10 units x 1   Patient was on insulin pump up to yesterday, discussed with diabetic management team, plan is to hold the insulin pump as patient is not able to manage  And continue with subcutaneous insulin.  Mealtime insulin held as blood sugar was in the 50s    , glucose controlled    Acute pulmonary edema  Hypoxemia  -noted on CT chest  -NTproBNP 1690   -s/p NS bolus in ED   Patient is currently on IV Bumex along with pressors  -ECHO showed EF of 35 to 40%  -needing 2LNC, try wean off as we diurese      JESSICA vs CKD  Non anion gap metabolic acidosis  -no recent baseline  -stopping IVF due to pulm edema  Creatinine improved, continue to hold nephrotoxic medication      Atypical Pneumonia?  -get  UA, will need straight cath - discussed with nursing  -PCT 6.3.  Continue Levaquin  -repeat Procal  -wean off O2     CAD,    Hx of SHANNAN to LCx and RCA .   S/p LCX SHANNAN PCI 6/2021.    Intolerant to BB therapy. Nitrates & CCB cause orthostatic hypotension   Continue Plavix and lipitor     PAF   No longer on DOAC      Essential hypertension  Mixed hyperlipidemia   On statin      PAD (peripheral artery disease) (HCC)   S/p left SFA stent 7/2021, Rt SFA 70% lesion asymptomatic. Manage medically.    S/p right SFA atherectomy and DCB PTA 10/2017.     S/p b/l iliac stents 7/2014.     s/p left SFA laser athetectomy and stent 9/2011   s/p left SFA angioplasty for ISR 1/2012.    s/p left SFA redo laser atherectomy and PTA 10/2012   S/p left SFA angioplasty 8/2013    Medical Decision Making:   I personally reviewed labs:  CBC, BMP  I personally reviewed imaging: Chest x-ray  Toxic drug monitoring:   Discussed case with: Cardiology,    Code status: DNR  Prophylaxis: Lovenox    Subjective:     Chief Complaint / Reason for Physician Visit    Blood pressure controlled/better than yesterday.  Patient started on milrinone.  Patient insistent on going home        Objective:     VITALS:   Most recent are:  Visit Vitals  BP (!) 106/49   Pulse 94   Temp 97.3 °F (36.3 °C) (Oral)   Resp 17   Ht 1.651 m (5' 5\")   Wt 58.3 kg (128 lb 8.5 oz)   SpO2 99%   BMI 21.39 kg/m²       I had a face to face encounter and independently examined this patient as outlined below:  PHYSICAL EXAM:  General: WD, WN. Alert, cooperative, no acute distress    EENT:  EOMI. Anicteric sclerae. MMM  Resp:  Diminished at bases.  No accessory muscle use  CV:  Regular  rhythm,  No edema  GI:  Soft, Non distended, Non tender.  +Bowel sounds  Neurologic:  Alert and awake, normal speech,   Psych:   Not anxious nor agitated  Skin:  No rashes.  No jaundice    Reviewed most current lab test results and cultures  YES  Reviewed most current radiology test results   YES  Review

## 2024-04-17 NOTE — PROGRESS NOTES
0710: Bedside shift change report given to AYLA Jones (oncoming nurse) by AYLA Miller (offgoing nurse). Report included the following information Nurse Handoff Report, Intake/Output, MAR, Recent Results, and Cardiac Rhythm Sinus Rhythm .     0830: This RN was present for all medication administrations completed by Heather Negro, Student Nurse on 4/17/24 from [id number].     0844: Milrinone gtt increased to 0.125 mcg/kg/min    0909: Patient got nauseated after taking her medication and vomited. All pills came up but patient's O2 dropped and she started with a junky cough. RT walking past and asked to listen, heard some rhonchi. Reached out to MD, CXR ordered and SLP consult.     1040: Patient spoke with Palliative NP and wishes to go home on hospice, SLP consult cancelled at this time.     1134: Palliative NP and Cardiologist had a conversation with patient and her  encouraging her to let us try to help her get better for the next 48hrs and discussed home milrinone. PT/OT eval ordered. Home health consult placed as well.     1506: New PIV placed.     1900:   End of Shift Note    Bedside shift change report given to AYLA Miller (oncoming nurse) by Lesa Cross RN (offgoing nurse).  Report included the following information SBAR, Intake/Output, MAR, and Recent Results    Shift worked:  0740-6091     Shift summary and any significant changes:     Please see above notes. Patient sat in the chair for a few hours today.      Concerns for physician to address:       Zone phone for oncoming shift:          Activity:     Number times ambulated in hallways past shift: 0  Number of times OOB to chair past shift: 1    Cardiac:   Cardiac Monitoring: Yes           Access:  Current line(s): PIV     Genitourinary:   Urinary status: voiding and incontinent    Respiratory:      Chronic home O2 use?: NO  Incentive spirometer at bedside: YES       GI:     Current diet:  ADULT DIET; Regular; 5 carb choices (75 gm/meal)  DIET ONE

## 2024-04-17 NOTE — CONSULTS
Palliative Medicine  Patient Name: Almaz Goyal  YOB: 1950  MRN: [id number]  Age: 73 y.o.  Gender: female    Date of Initial Consult: 4/17/24  Date of Service: 4/17/2024  Time: 3:34 PM  Provider: MODESTO Otoole NP  Hospital Day: 5  Admit Date: 4/13/2024  Referring Provider: Boris Ulloa MD     Reasons for Consultation:  Goals of Care    HISTORY OF PRESENT ILLNESS (HPI):   Almaz Goyal is a 73 y.o. female with a past medical history of DM1, CKD, HTN, and previous TIA, who was admitted on 4/13/2024 from home with a diagnosis of uncontrolled type 1 DM, with non anion gap metabolic acidosis.    Her hospitalization has been complicated by Hypotension from cardiac vs septic shock, pulmonary edema and cardio renal syndrome.  She is being treated with Milrinone at this time.     PALLIATIVE DIAGNOSES:    Goals of care  DNR Discussion  Cardiogenic shock  Palliative Encounter    ASSESSMENT AND PLAN:   Met with Ms Goyal today twice- she is adamant that she wants to go home TODAY.   Talked through her heart and kidney issues with her, and discussed with her some options along with cardiology  Home today with hospice support, no inotropes, and symptom mgmt through their team  Stay here for another 48 hours to fine tune the milrinone, see how well she moves around, and get milrinone set up outpatient.  Ms Goyal initially said yes to hospice, then no, but then yes again because she wants to leave today.  I really tried to encourage her to give us a little bit of time to maximize medical therapy so that she can get home and live well for a while longer.  She is reluctantly agreeable with support from her  but I agreed to come back Friday to discuss hospice with her again if we cannot get things set up for discharge by then  Talked to nursing, no need for speech eval at this time given that she is clear she doesn't want any escalation in her care, or any more procedures.    Completed DDNR with her  Status: [x] Normal mental status exam  [] Drowsy  [] Confused  []Other:  Cardiovascular: [x] Regular rate/rhythm  [] Arrhythmia  [] Other:  Chest: [x] Effort normal  []Lungs clear  [] Respiratory distress  []Tachypnea  [] Other:  Abdomen: [x] Soft/non-tender  [] Normal appearance  [] Distended  [] Ascites  [] Other:  Neurological: [x] Normal speech  [] Normal sensation  []Deficits present:  Extremity: [x] Normal skin color/temp  [] Clubbing/cyanosis  [] No edema  [] Other:    Wt Readings from Last 15 Encounters:   04/17/24 58.3 kg (128 lb 8.5 oz)   03/15/22 66.8 kg (147 lb 4.8 oz)   09/07/21 68.5 kg (151 lb)   06/22/21 69.8 kg (153 lb 14.4 oz)   05/27/21 71.7 kg (158 lb)   05/25/21 70.3 kg (155 lb)   05/11/21 71.7 kg (158 lb)        Current Diet: ADULT DIET; Regular; 5 carb choices (75 gm/meal)  DIET ONE TIME MESSAGE;       PSYCHOSOCIAL/SPIRITUAL SCREENING:   Palliative IDT has assessed this patient for cultural preferences / practices and a referral made as appropriate to needs (Cultural Services, Patient Advocacy, Ethics, etc.)    Spiritual Affiliation: Other    Any spiritual / Rastafarian concerns:  [] Yes /  [x] No   If \"Yes\" to discuss with pastoral care during IDT     Does caregiver feel burdened by caring for their loved one:   [] Yes /  [x] No /  [] No Caregiver Present/Available [] No Caregiver [] Pt Lives at Facility  If \"Yes\" to discuss with social work during IDT    Anticipatory grief assessment:   [x] Normal  / [] Maladaptive     If \"Maladaptive\" to discuss with social work during IDT    ESAS Anxiety: Anxiety Score: Not anxious    ESAS Depression: Depression Score: Not depressed        LAB AND IMAGING FINDINGS:   Objective data reviewed:  labs, images, records, medication use, vitals, and chart     FINAL COMMENTS   Thank you for allowing Palliative Medicine to participate in the care of Almaz Goyal.    Electronically signed by   MODESTO Otoole NP  Palliative Care Team  on 4/17/2024 at 3:34

## 2024-04-17 NOTE — ACP (ADVANCE CARE PLANNING)
Advance Care Planning      Palliative Medicine Provider (MD/NP)  Advance Care Planning (ACP) Conversation      Date of Conversation: 04/17/24    The patient and/or authorized decision maker consented to a voluntary Advance Care Planning conversation.   Individuals present for the conversation:  Patient with decision making capacity  Other persons present:  Spouse Richard    Legal Healthcare Decision Maker(s):    Primary Decision Maker: Richard Goyal - Spouse - [phone]    ACP documents available in EMR prior to discussion:  [] Advance Medical Directive  [] Portable DNR  [] POLST  [] [place] MOST   [x] None  [] ACP documents have been previously completed by patient or surrogate, but are not available today for review.        Primary Palliative Diagnosis:   Cardiogenic shock  Severe hypoglycemia  Anorexia  Goals of care  DNR Discussion    Conversation Summary:   Ms Goyal wants to go home- she is not interested in any medical management because she wants to be home.  We talked about Hospice, and she wants hospice if it means she can leave today.  Encouraged her to give us another 48 hours to allow us to set up home oxygen, see how much she can do with PT/OT, and fine tune her milrinone.  Cards is willing to manage her at home with a milrinone gtt so this will need to be set up.    She is not keen on this plan, but has reluctantly agreed.  She tells me that she was never to live past age 8 due to her type 1DM, but her mom was on top of everything and ensured that she stayed on track.  She now has her  who is an EMT and is her accountability partner.      If we cannot get this all set up in 48 hours, I promised her that we will re-visit hospice    Resuscitation Status:   Code Status: DNR     Outcomes / Completed Documentation:  An explanation of advance directives and their importance was provided and the following forms completed:    [] Advance Medical Directive  [x] Portable DNR   [] POLST  [] No new  documents completed  [] Patient or surrogate was asked to provide previously completed documents to the palliative team    If new document completed, original was provided to patient and/or family member.    Copy was placed for scanning into the [facility] EMR.      I spent 46 minutes providing separately identifiable ACP services with the patient and/or surrogate decision maker in a voluntary, in-person conversation discussing the patient's wishes and goals as detailed in the above note.       MODESTO Otoole - NP

## 2024-04-17 NOTE — PLAN OF CARE
Problem: Discharge Planning  Goal: Discharge to home or other facility with appropriate resources  Outcome: Progressing     Problem: Skin/Tissue Integrity  Goal: Absence of new skin breakdown  Description: 1.  Monitor for areas of redness and/or skin breakdown  2.  Assess vascular access sites hourly  3.  Every 4-6 hours minimum:  Change oxygen saturation probe site  4.  Every 4-6 hours:  If on nasal continuous positive airway pressure, respiratory therapy assess nares and determine need for appliance change or resting period.  Outcome: Progressing     Problem: Safety - Adult  Goal: Free from fall injury  4/17/2024 1041 by Lesa Cross, RN  Outcome: Progressing  4/17/2024 0004 by Angela Campos RN  Outcome: Progressing     Problem: Neurosensory - Adult  Goal: Achieves stable or improved neurological status  Outcome: Progressing  Goal: Absence of seizures  Outcome: Progressing  Goal: Remains free of injury related to seizures activity  Outcome: Progressing  Goal: Achieves maximal functionality and self care  Outcome: Progressing     Problem: Respiratory - Adult  Goal: Achieves optimal ventilation and oxygenation  Outcome: Progressing     Problem: Cardiovascular - Adult  Goal: Maintains optimal cardiac output and hemodynamic stability  Outcome: Progressing  Goal: Absence of cardiac dysrhythmias or at baseline  Outcome: Progressing     Problem: Skin/Tissue Integrity - Adult  Goal: Skin integrity remains intact  Outcome: Progressing  Goal: Incisions, wounds, or drain sites healing without S/S of infection  Outcome: Progressing  Goal: Oral mucous membranes remain intact  Outcome: Progressing     Problem: Musculoskeletal - Adult  Goal: Return mobility to safest level of function  Outcome: Progressing  Goal: Maintain proper alignment of affected body part  Outcome: Progressing  Goal: Return ADL status to a safe level of function  Outcome: Progressing     Problem: Gastrointestinal - Adult  Goal: Minimal or  absence of nausea and vomiting  Outcome: Progressing  Goal: Maintains or returns to baseline bowel function  Outcome: Progressing  Goal: Maintains adequate nutritional intake  Outcome: Progressing  Goal: Establish and maintain optimal ostomy function  Outcome: Progressing     Problem: Genitourinary - Adult  Goal: Absence of urinary retention  Outcome: Progressing  Goal: Urinary catheter remains patent  Outcome: Progressing     Problem: Infection - Adult  Goal: Absence of infection at discharge  Outcome: Progressing  Goal: Absence of infection during hospitalization  Outcome: Progressing  Goal: Absence of fever/infection during anticipated neutropenic period  Outcome: Progressing     Problem: Metabolic/Fluid and Electrolytes - Adult  Goal: Electrolytes maintained within normal limits  Outcome: Progressing  Goal: Hemodynamic stability and optimal renal function maintained  Outcome: Progressing  Goal: Glucose maintained within prescribed range  Outcome: Progressing     Problem: Chronic Conditions and Co-morbidities  Goal: Patient's chronic conditions and co-morbidity symptoms are monitored and maintained or improved  Outcome: Progressing

## 2024-04-17 NOTE — PROGRESS NOTES
Attempted to see pt for OT services.  Pt had just returned to supine and nursing was drawing labs.  On second attempt pt declined getting OOB due to just laying back down.  Will defer but continue to follow.

## 2024-04-17 NOTE — PROGRESS NOTES
Physical Therapy     Chart reviewed up to date and orders acknowledged. Attempted to see pt for PT services.  Pt had just returned to supine and nursing was drawing labs.  On second attempt pt declined getting OOB due to just laying back down.  Will defer but continue to follow.     Mary Santos PT, DPT, NCS

## 2024-04-17 NOTE — DIABETES MGMT
Medication   Basal needs Using 0.17 units/kg/D based on weight  Using 5 units NPH Q 12 hours    Nutritional needs Using low dose  sensitivity based on weight  Using 3 units with each meal when 50 % or more is consumed; hold for now   Corrective insulin Using medium dose  sensitivity based on BG trends    Additional orders          Diabetes Discharge Plan   Medication  TBD   Referral  []        Outpatient diabetes education   Additional orders            Initial Presentation   Almaz Goyal is a 73 y.o. female admitted  after experiencing hyperglycemia, vomiting and generalized un wellness. The patient reports insulin pump was not working properly.   LAB: Glucose 347. Creatine 1.91. GFR 28. A1c  6.2%.   CXR:Narrative & Impression  EXAM:  XR CHEST PORTABLE     INDICATION: hyperglycemia, screening      COMPARISON: 2021     TECHNIQUE: portable chest AP view at 1908     FINDINGS: The cardiac silhouette is normal. There is a diffuse increase in  interstitial markings, especially at the bases. No pleural effusion.        IMPRESSION:  Interstitial edema favored over interstitial pneumonia. Clinical correlation  needed.        CT:Narrative & Impression  EXAM: CT HEAD WO CONTRAST     INDICATION: Hyperglycemia, change in mental status, hypertension     COMPARISON: 2010.     CONTRAST: None.     TECHNIQUE: Unenhanced CT of the head was performed using 5 mm images. Brain and  bone windows were generated. Coronal and sagittal reformats. CT dose reduction  was achieved through use of a standardized protocol tailored for this  examination and automatic exposure control for dose modulation.       FINDINGS:  The ventricles and sulci are stable in size, shape and configuration. There is  moderate patchy paraventricular white matter hypodensity which has significantly  progressed as compared to 2010. Lacunar infarcts in both thalami, internal  capsules, and external capsules. Also in the midbrain.. There is no  Deferred     Physical activity pattern Deferred   [x] Employing a physical activity program to control BG    Monitoring pattern   [x] Testing BGs sufficiently to inform self-management adjustments    Taking medications pattern  [x] Consistent administration  [x] Affordable    Social determinants of health impacting diabetes self-management practices    Concerned that you need to know more about how to stay healthy with diabetes    Overall evaluation:    [x] Achieving A1c target     Subjective   ”I want to go home\"      Objective   Physical exam  General Normal weight  female in acute distress; ill-appearing. Weak and cooperative  Neuro  Alert, oriented   Vital Signs   Vitals:    04/17/24 1105   BP: (!) 99/53   Pulse: 90   Resp: 12   Temp: 97.4 °F (36.3 °C)   SpO2: 94%         Laboratory  Recent Labs     04/15/24  0526 04/16/24  1335   WBC 19.2* 11.7*   HGB 12.8 12.0   HCT 38.8 37.3   MCV 94.9 94.9    160       Recent Labs     04/15/24  0445 04/16/24  1335 04/17/24  0905    140 139   K 4.9 3.8 3.9    104 106   CO2 21 30 29   PHOS 5.1*  --   --    BUN 50* 71* 65*   CREATININE 2.00* 2.32* 1.93*       Lab Results   Component Value Date    ALT 52 04/16/2024     (H) 04/16/2024    ALKPHOS 84 04/16/2024    BILITOT 0.8 04/16/2024     No results found for: \"TSH\", \"TSHREFLEX\", \"TSHFT4\", \"TSHELE\", \"AKX8ACB\", \"TSHHS\"  Lab Results   Component Value Date    LABA1C 6.2 (H) 04/14/2024       Factors impacting BG management  Factor Dose Comments   Nutrition:  Standard meals     60 grams/meal      Not eating much   Pain PRN acetaminophen    Infection Levaquin    Other:   Kidney function  Liver function     JESSICA   ( 4/16)        Blood glucose pattern        Assessment and Nursing Intervention   Nursing Diagnosis Risk for unstable blood glucose pattern   Nursing Intervention Domain 0869 Decision-making Support   Nursing Interventions Examined current inpatient diabetes/blood glucose control   Explored  factors facilitating and impeding inpatient management  Explored corrective strategies with patient and responsible inpatient provider   Informed patient of rational for insulin strategy while hospitalized     Nursing Diagnosis 44600 Ineffective Health Management   Nursing Intervention Domain 5250 Decision-making Support   Nursing Interventions Identified diabetes self-management practices impeding diabetes control  Discussed diabetes survival skills related to  Healthy Plate eating plan; given handouts  Role of physical activity in improving insulin sensitivity and action  Procedure for blood glucose monitoring & options for low-cost products  Medications plan at discharge     Billing Code(s)   [] 28101 IP subsequent hospital care - 50 minutes   [] 33406 IP subsequent hospital care - 35 minutes   [x] 66766 IP subsequent hospital care - 25 minutes   [] 68464 IP initial hospital care - 40 minutes     Before making these care recommendations, I personally reviewed the hospitalization record, including notes, laboratory & diagnostic data and current medications, and examined the patient at the bedside (circumstances permitting) before determining care. More than fifty (50) percent of the time was spent in patient counseling and/or care coordination.  Total minutes: 25 minutes    MODESTO Giordano - CNS  Diabetes Clinical Nurse Specialist  [facility]  Access via "[employer]

## 2024-04-17 NOTE — PLAN OF CARE
Problem: Discharge Planning  Goal: Discharge to home or other facility with appropriate resources  4/16/2024 1852 by Mer Caceres RN  Outcome: Not Progressing     Problem: Skin/Tissue Integrity  Goal: Absence of new skin breakdown  Description: 1.  Monitor for areas of redness and/or skin breakdown  2.  Assess vascular access sites hourly  3.  Every 4-6 hours minimum:  Change oxygen saturation probe site  4.  Every 4-6 hours:  If on nasal continuous positive airway pressure, respiratory therapy assess nares and determine need for appliance change or resting period.  4/16/2024 1852 by Mer Caceres RN  Outcome: Not Progressing     Problem: Neurosensory - Adult  Goal: Achieves stable or improved neurological status  4/16/2024 1852 by Mer Caceres RN  Outcome: Not Progressing  Flowsheets (Taken 4/16/2024 0830)  Achieves stable or improved neurological status: Assess for and report changes in neurological status  Goal: Absence of seizures  4/16/2024 1852 by Mer Caceres RN  Outcome: Not Progressing  Goal: Remains free of injury related to seizures activity  4/16/2024 1852 by Mer Caceres RN  Outcome: Not Progressing  Goal: Achieves maximal functionality and self care  4/16/2024 1852 by Mer Caceres RN  Outcome: Not Progressing     Problem: Respiratory - Adult  Goal: Achieves optimal ventilation and oxygenation  4/16/2024 1852 by Mer Caceres RN  Outcome: Not Progressing     Problem: Cardiovascular - Adult  Goal: Maintains optimal cardiac output and hemodynamic stability  4/16/2024 1852 by Mer Caceres RN  Outcome: Not Progressing  Goal: Absence of cardiac dysrhythmias or at baseline  4/16/2024 1852 by Mer Caceres RN  Outcome: Not Progressing     Problem: Skin/Tissue Integrity - Adult  Goal: Skin integrity remains intact  4/16/2024 1852 by Mer Caceres RN  Outcome: Not Progressing  Flowsheets (Taken 4/16/2024 0830)  Skin Integrity Remains Intact: Monitor for areas of redness  and/or skin breakdown  Goal: Incisions, wounds, or drain sites healing without S/S of infection  4/16/2024 1852 by Mer Caceres RN  Outcome: Not Progressing  Goal: Oral mucous membranes remain intact  4/16/2024 1852 by Mer Caceres RN  Outcome: Not Progressing     Problem: Musculoskeletal - Adult  Goal: Return mobility to safest level of function  4/16/2024 1852 by Mer Caceres RN  Outcome: Not Progressing  Goal: Maintain proper alignment of affected body part  4/16/2024 1852 by Mer Caceres RN  Outcome: Not Progressing  Goal: Return ADL status to a safe level of function  4/16/2024 1852 by Mer Caceres RN  Outcome: Not Progressing     Problem: Gastrointestinal - Adult  Goal: Minimal or absence of nausea and vomiting  4/16/2024 1852 by Mer Caceres RN  Outcome: Not Progressing  Goal: Maintains or returns to baseline bowel function  4/16/2024 1852 by Mer Caceres RN  Outcome: Not Progressing  Goal: Maintains adequate nutritional intake  4/16/2024 1852 by Mer Caceres RN  Outcome: Not Progressing  Goal: Establish and maintain optimal ostomy function  4/16/2024 1852 by Mer Caceres RN  Outcome: Not Progressing     Problem: Genitourinary - Adult  Goal: Absence of urinary retention  4/16/2024 1852 by Mer Caceres RN  Outcome: Not Progressing  Goal: Urinary catheter remains patent  4/16/2024 1852 by Mer Caceres RN  Outcome: Not Progressing     Problem: Infection - Adult  Goal: Absence of infection at discharge  4/16/2024 1852 by Mer Caceres RN  Outcome: Not Progressing  Goal: Absence of infection during hospitalization  4/16/2024 1852 by Mer Caceres RN  Outcome: Not Progressing  Goal: Absence of fever/infection during anticipated neutropenic period  4/16/2024 1852 by Mer Caceres RN  Outcome: Not Progressing     Problem: Metabolic/Fluid and Electrolytes - Adult  Goal: Electrolytes maintained within normal limits  4/16/2024 1852 by Mer Caceres RN  Outcome: Not

## 2024-04-17 NOTE — PROGRESS NOTES
Pharmacy Antimicrobial Kinetic Dosing    Indication for Antimicrobials: Sepsis/CAP     Current Regimen of Each Antimicrobial:  Vancomycin pharmacy to dose; Start Date ; Day 2  Levofloxacin 750 mg IV q48h; Start Date ; Day 5  Metronidazole 500 mg IV q8h; Start Date ; Day 2    Previous Antimicrobial Therapy:       Goal Level: Vancomycin -600    Date Dose & Interval Measured (mcg/mL) Predicted AUC    0154 1250mg x 1  15.3                  Significant Cultures:    blood: NGTD   urine: GNRs    Labs:  Recent Labs     Units 24  1335 04/15/24  0526 04/15/24  0445 24  0421   CREATININE MG/DL 2.32*  --  2.00* 1.81*   BUN MG/DL 71*  --  50* 40*   PROCAL ng/mL  --   --   --  6.38   WBC K/uL 11.7* 19.2*  --  13.1*     Temp (24hrs), Av.8 °F (36.6 °C), Min:97.5 °F (36.4 °C), Max:98.1 °F (36.7 °C)      Conditions for Dosing Consideration: None    Creatinine Clearance (mL/min): Estimated Creatinine Clearance: 20 mL/min (A) (based on SCr of 2.32 mg/dL (H)).       Impression/Plan:   Patient in JESSICA  Vancomycin level within therapeutic range (15.3)   Give 500mg x 1   Recheck random level 4/18 am labs   Metronidazole change to 500 mg q8h per protocol  Continue levofloxacin  Antimicrobial stop date TBD     Pharmacy will follow daily and adjust medications as appropriate for renal function and/or serum levels.    Thank you,  Yoana Guerra, [facility]

## 2024-04-17 NOTE — PROGRESS NOTES
[facility]  [address], [place] [id number] [phone]  [email or web address]  CARDIOLOGY PROGRESS NOTE    4/17/2024 7:06 PM    Admit Date: 4/13/2024    Admit Diagnosis:   Hypomagnesemia [E83.42]  Hyperglycemia [R73.9]  Abnormal chest x-ray [R93.89]  JESSICA (acute kidney injury) (HCC) [N17.9]  Lactic acidosis due to diabetes mellitus (HCC) [E11.10]    Subjective:     Almaz Goyal was seen and examined at the bedside.  Discussed treatment options for heart failure and possibility of improvement.  However, the patient insists that she would like to go home as soon as possible.  Wishes to have hospice care instituted.  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  The patient does report poor appetite.  Patient's usual weight is about 150 pounds.    BP (!) 106/49   Pulse 94   Temp 97.3 °F (36.3 °C) (Oral)   Resp 17   Ht 1.651 m (5' 5\")   Wt 58.3 kg (128 lb 8.5 oz)   SpO2 99%   BMI 21.39 kg/m²     Current Facility-Administered Medications   Medication Dose Route Frequency    [START ON 4/18/2024] Vanco random level 4/18 with AM labs - please collect   Other Once    milrinone (PRIMACOR) 20 mg in dextrose 5 % 100 mL infusion  0.125 mcg/kg/min IntraVENous Continuous    ipratropium 0.5 mg-albuterol 2.5 mg (DUONEB) nebulizer solution 1 Dose  1 Dose Inhalation Q4H PRN    midodrine (PROAMATINE) tablet 5 mg  5 mg Oral TID WC    metroNIDAZOLE (FLAGYL) 500 mg in 0.9% NaCl 100 mL IVPB premix  500 mg IntraVENous Q8H    Vancomycin- Pharmacy to dose  1 each Other RX Placeholder    insulin NPH (HumuLIN N;NovoLIN N) injection vial 5 Units  5 Units SubCUTAneous BID    glucose chewable tablet 16 g  4 tablet Oral PRN    dextrose bolus 10% 125 mL  125 mL IntraVENous PRN    Or    dextrose bolus 10% 250 mL  250 mL IntraVENous PRN    glucagon injection 1 mg  1 mg SubCUTAneous PRN    dextrose 10 % infusion   IntraVENous Continuous PRN    [Held by provider] insulin lispro

## 2024-04-18 LAB
GLUCOSE BLD STRIP.AUTO-MCNC: 234 MG/DL (ref 65–117)
GLUCOSE BLD STRIP.AUTO-MCNC: 240 MG/DL (ref 65–117)
GLUCOSE BLD STRIP.AUTO-MCNC: 241 MG/DL (ref 65–117)
GLUCOSE BLD STRIP.AUTO-MCNC: 285 MG/DL (ref 65–117)
SERVICE CMNT-IMP: ABNORMAL
VANCOMYCIN SERPL-MCNC: 11.6 UG/ML

## 2024-04-18 PROCEDURE — 82962 GLUCOSE BLOOD TEST: CPT

## 2024-04-18 PROCEDURE — 6360000002 HC RX W HCPCS: Performed by: STUDENT IN AN ORGANIZED HEALTH CARE EDUCATION/TRAINING PROGRAM

## 2024-04-18 PROCEDURE — 97530 THERAPEUTIC ACTIVITIES: CPT

## 2024-04-18 PROCEDURE — 6360000002 HC RX W HCPCS: Performed by: INTERNAL MEDICINE

## 2024-04-18 PROCEDURE — 2060000000 HC ICU INTERMEDIATE R&B

## 2024-04-18 PROCEDURE — 36415 COLL VENOUS BLD VENIPUNCTURE: CPT

## 2024-04-18 PROCEDURE — 2580000003 HC RX 258: Performed by: INTERNAL MEDICINE

## 2024-04-18 PROCEDURE — 6370000000 HC RX 637 (ALT 250 FOR IP): Performed by: STUDENT IN AN ORGANIZED HEALTH CARE EDUCATION/TRAINING PROGRAM

## 2024-04-18 PROCEDURE — 80202 ASSAY OF VANCOMYCIN: CPT

## 2024-04-18 PROCEDURE — 6370000000 HC RX 637 (ALT 250 FOR IP)

## 2024-04-18 PROCEDURE — 97535 SELF CARE MNGMENT TRAINING: CPT | Performed by: OCCUPATIONAL THERAPIST

## 2024-04-18 PROCEDURE — 97530 THERAPEUTIC ACTIVITIES: CPT | Performed by: OCCUPATIONAL THERAPIST

## 2024-04-18 PROCEDURE — 97163 PT EVAL HIGH COMPLEX 45 MIN: CPT

## 2024-04-18 PROCEDURE — 6370000000 HC RX 637 (ALT 250 FOR IP): Performed by: INTERNAL MEDICINE

## 2024-04-18 PROCEDURE — 97165 OT EVAL LOW COMPLEX 30 MIN: CPT | Performed by: OCCUPATIONAL THERAPIST

## 2024-04-18 PROCEDURE — 99231 SBSQ HOSP IP/OBS SF/LOW 25: CPT

## 2024-04-18 RX ORDER — DIGOXIN 125 MCG
62.5 TABLET ORAL DAILY
Status: DISCONTINUED | OUTPATIENT
Start: 2024-04-18 | End: 2024-04-19 | Stop reason: HOSPADM

## 2024-04-18 RX ORDER — TORSEMIDE 20 MG/1
20 TABLET ORAL DAILY
Status: DISCONTINUED | OUTPATIENT
Start: 2024-04-18 | End: 2024-04-19 | Stop reason: HOSPADM

## 2024-04-18 RX ORDER — MILRINONE LACTATE 0.2 MG/ML
0.06 INJECTION, SOLUTION INTRAVENOUS CONTINUOUS
Status: DISPENSED | OUTPATIENT
Start: 2024-04-18 | End: 2024-04-18

## 2024-04-18 RX ADMIN — MIDODRINE HYDROCHLORIDE 5 MG: 5 TABLET ORAL at 08:23

## 2024-04-18 RX ADMIN — ENOXAPARIN SODIUM 30 MG: 100 INJECTION SUBCUTANEOUS at 08:58

## 2024-04-18 RX ADMIN — CLOPIDOGREL BISULFATE 75 MG: 75 TABLET ORAL at 08:22

## 2024-04-18 RX ADMIN — INSULIN LISPRO 3 UNITS: 100 INJECTION, SOLUTION INTRAVENOUS; SUBCUTANEOUS at 17:42

## 2024-04-18 RX ADMIN — TORSEMIDE 20 MG: 20 TABLET ORAL at 13:31

## 2024-04-18 RX ADMIN — INSULIN LISPRO 4 UNITS: 100 INJECTION, SOLUTION INTRAVENOUS; SUBCUTANEOUS at 12:12

## 2024-04-18 RX ADMIN — LEVOTHYROXINE SODIUM 50 MCG: 0.05 TABLET ORAL at 05:44

## 2024-04-18 RX ADMIN — ONDANSETRON 4 MG: 2 INJECTION INTRAMUSCULAR; INTRAVENOUS at 12:19

## 2024-04-18 RX ADMIN — ATORVASTATIN CALCIUM 20 MG: 20 TABLET, FILM COATED ORAL at 08:23

## 2024-04-18 RX ADMIN — METRONIDAZOLE 500 MG: 500 INJECTION, SOLUTION INTRAVENOUS at 21:15

## 2024-04-18 RX ADMIN — SODIUM CHLORIDE, PRESERVATIVE FREE 10 ML: 5 INJECTION INTRAVENOUS at 08:30

## 2024-04-18 RX ADMIN — SODIUM CHLORIDE, PRESERVATIVE FREE 10 ML: 5 INJECTION INTRAVENOUS at 21:13

## 2024-04-18 RX ADMIN — MIDODRINE HYDROCHLORIDE 5 MG: 5 TABLET ORAL at 12:12

## 2024-04-18 RX ADMIN — METRONIDAZOLE 500 MG: 500 INJECTION, SOLUTION INTRAVENOUS at 13:47

## 2024-04-18 RX ADMIN — Medication 2000 UNITS: at 08:23

## 2024-04-18 RX ADMIN — INSULIN HUMAN 7 UNITS: 100 INJECTION, SUSPENSION SUBCUTANEOUS at 21:15

## 2024-04-18 RX ADMIN — METRONIDAZOLE 500 MG: 500 INJECTION, SOLUTION INTRAVENOUS at 05:38

## 2024-04-18 RX ADMIN — INSULIN LISPRO 2 UNITS: 100 INJECTION, SOLUTION INTRAVENOUS; SUBCUTANEOUS at 17:43

## 2024-04-18 RX ADMIN — VANCOMYCIN HYDROCHLORIDE 500 MG: 500 INJECTION, POWDER, LYOPHILIZED, FOR SOLUTION INTRAVENOUS at 10:44

## 2024-04-18 RX ADMIN — INSULIN LISPRO 2 UNITS: 100 INJECTION, SOLUTION INTRAVENOUS; SUBCUTANEOUS at 08:23

## 2024-04-18 RX ADMIN — MIDODRINE HYDROCHLORIDE 5 MG: 5 TABLET ORAL at 17:42

## 2024-04-18 RX ADMIN — SODIUM CHLORIDE: 9 INJECTION, SOLUTION INTRAVENOUS at 10:40

## 2024-04-18 RX ADMIN — INSULIN HUMAN 5 UNITS: 100 INJECTION, SUSPENSION SUBCUTANEOUS at 08:23

## 2024-04-18 RX ADMIN — MILRINONE LACTATE IN DEXTROSE 0.06 MCG/KG/MIN: 200 INJECTION, SOLUTION INTRAVENOUS at 14:05

## 2024-04-18 RX ADMIN — DIGOXIN 62.5 MCG: 125 TABLET ORAL at 12:11

## 2024-04-18 RX ADMIN — SODIUM CHLORIDE: 9 INJECTION, SOLUTION INTRAVENOUS at 13:37

## 2024-04-18 NOTE — PROGRESS NOTES
04/18/24 1125 04/18/24 1130 04/18/24 1133   Vital Signs   Pulse 89  --  (!) 109   BP (!) 93/41  --  (!) 97/58   MAP (Calculated) 58  --  71   Patient Position Semi fowlers  --  Sitting   Oxygen Therapy   SpO2 96 % 92 %  --    O2 Device Nasal cannula None (Room air)  --       04/18/24 1139 04/18/24 1140 04/18/24 1144   Vital Signs   Pulse (!) 132 (!) 120 (!) 114   BP (!) 78/39 (!) 103/47 (!) 95/49   MAP (Calculated) 52 66 64   Patient Position Standing Sitting Sitting;Other (Comment)  (post transfer bed to chair(legs down))   Oxygen Therapy   SpO2 91 %  --  92 %   O2 Device None (Room air)  --  None (Room air)      04/18/24 1145 04/18/24 1147 04/18/24 1152   Vital Signs   Pulse (!) 102 100 (!) 101   BP (!) 89/39 (!) 95/47 (!) 97/43   MAP (Calculated) 56 63 61   Patient Position Sitting;Other (Comment)  (legs elevated) Sitting  (legs up and reclined) Supine   Oxygen Therapy   SpO2 90 %  --  90 %   O2 Device  --   --  None (Room air)       Tee Curry PT

## 2024-04-18 NOTE — PROGRESS NOTES
Informed by case management that the patient did not opt for hospice therapy and is considering home milrinone.  Noted weight loss of about 2 pounds with diuretic and inotropic therapy yesterday with some improvement in renal function.  Recommend weaning milrinone to 0.06 mcg/kg/min dose now and with aim to stop this therapy in 12 hours.  Start digoxin 62.5 mcg daily.  Would recommend to continue diuretic as an outpatient, recommend torsemide 20 mg orally daily with lab work for kidneys and office visit with [facility] within a week from discharge.    Marked hypotension is limiting institution of any guideline directed medical therapy.  Midodrine as needed.    Coronary artery disease: Continue clopidogrel    Thank you for giving us the opportunity to participate in the care of this patient.  We will follow.

## 2024-04-18 NOTE — DIABETES MGMT
[facility]  PROGRAM FOR DIABETES HEALTH  DIABETES MANAGEMENT CONSULT    Consulted by Provider for advanced nursing evaluation and care for inpatient blood glucose management.    Evaluation and Action Plan   Almaz Goyal is a 73 year old female patient with usually well controlled diabetes. The patient uses an insulin ump ( Tandem), however her  is the one that operates and changes the set for her. The patient does not seem knowledgeable about insulin pump use. Today the patient is weak and does not feel well. The patient's  is not at the bedside. The patient;s BG's reading high and insulin pump alarming. The patient is unaware how to bolus on pump. I recommend the patient not use pump while hospitalized and veto resume at discharge. The patient must be able to use and operate insulin pump or her  must remain at the bedside to dos so in order to keep pump use while hospitalized. Neither of those things are in place. The patient is agreeable to removing pump. Insulin injections  basal/bolus will be uses instead.   FBG was stable this morning This afternoon the patient has a low BG. She has scheduled meal time , however I suspect she did not eat much. I will hold meal time insulin for now, until the patient is consuming greater than 50 % of meals.   Update: Spoke with the patient and patient's spouse on the policy regarding insulin pump. Since the patient is unable to operate on her own and the patient's  will not be staying with the patient through the night, it has been agreed that the insulin pump will remain off until discharge. The patient will be given basal/bolus insulin injections. The patient will require basal insulin since she has Type 1 diabetes ( dosing may require reductions at times).     BG's stable on the current dosing. The patient is still eating very little today. I will continue to hold the scheduled meal time insulin for now.     Bg's have been elevated overnight and

## 2024-04-18 NOTE — PLAN OF CARE
Problem: Discharge Planning  Goal: Discharge to home or other facility with appropriate resources  Outcome: Progressing     Problem: Skin/Tissue Integrity  Goal: Absence of new skin breakdown  Description: 1.  Monitor for areas of redness and/or skin breakdown  2.  Assess vascular access sites hourly  3.  Every 4-6 hours minimum:  Change oxygen saturation probe site  4.  Every 4-6 hours:  If on nasal continuous positive airway pressure, respiratory therapy assess nares and determine need for appliance change or resting period.  Outcome: Progressing     Problem: Safety - Adult  Goal: Free from fall injury  Outcome: Progressing     Problem: Neurosensory - Adult  Goal: Achieves stable or improved neurological status  Outcome: Progressing  Goal: Absence of seizures  Outcome: Progressing  Goal: Remains free of injury related to seizures activity  Outcome: Progressing  Goal: Achieves maximal functionality and self care  Outcome: Progressing     Problem: Respiratory - Adult  Goal: Achieves optimal ventilation and oxygenation  Outcome: Progressing     Problem: Cardiovascular - Adult  Goal: Maintains optimal cardiac output and hemodynamic stability  Outcome: Progressing  Goal: Absence of cardiac dysrhythmias or at baseline  Outcome: Progressing     Problem: Skin/Tissue Integrity - Adult  Goal: Skin integrity remains intact  Outcome: Progressing  Goal: Incisions, wounds, or drain sites healing without S/S of infection  Outcome: Progressing  Goal: Oral mucous membranes remain intact  Outcome: Progressing     Problem: Musculoskeletal - Adult  Goal: Return mobility to safest level of function  Outcome: Progressing  Goal: Maintain proper alignment of affected body part  Outcome: Progressing  Goal: Return ADL status to a safe level of function  Outcome: Progressing     Problem: Gastrointestinal - Adult  Goal: Minimal or absence of nausea and vomiting  Outcome: Progressing  Goal: Maintains or returns to baseline bowel

## 2024-04-18 NOTE — PLAN OF CARE
Problem: Occupational Therapy - Adult  Goal: By Discharge: Performs self-care activities at highest level of function for planned discharge setting.  See evaluation for individualized goals.  Description: FUNCTIONAL STATUS PRIOR TO ADMISSION:  Patient reports ambulating with a quad cane and being independent to mod I for ADLs using bathroom DME.  HOME SUPPORT: Patient was living with her .    Occupational Therapy Goals:  Initiated 4/18/2024  1.  Patient will perform grooming standing at sink with Supervision within 7 day(s).  2.  Patient will perform upper body dressing with Set-up and Supervision within 7 day(s).  3.  Patient will perform lower body dressing with Set-up and Supervision within 7 day(s).  4.  Patient will perform toilet transfers with Set-up and Supervision  within 7 day(s).  5.  Patient will perform all aspects of toileting with Set-up and Supervision within 7 day(s).  6.  Patient will sponge bathing with Set-up and Supervision within 7 day(s).     4/18/2024 1504 by Rick Arellano, OTR/L  Outcome: Progressingz    OCCUPATIONAL THERAPY EVALUATION    Patient: Almaz Goyal (73 y.o. female)  Date: 4/18/2024  Primary Diagnosis: Hypomagnesemia [E83.42]  Hyperglycemia [R73.9]  Abnormal chest x-ray [R93.89]  JESSICA (acute kidney injury) (HCC) [N17.9]  Lactic acidosis due to diabetes mellitus (HCC) [E11.10]         Precautions: Up as Tolerated, Fall Risk, Bed Alarm, Other (comment) (orthostatic hypotension)                  ASSESSMENT :  The patient is currently limited by GW, orthostatic hypotension, decreased activity tolerance, decreased safety awareness and decreased standing balance which is impairing her functional independence. She is now functioning below her independent to mod I baseline, performing ADLs at an independent to min A level and is CGA to max A of 2 for functional mobility. At this time the patient will continue to benefit from acute OT and she will need HHOT, PT and the    Entrance Stairs - Rails: None  Bathroom Shower/Tub: Walk-in shower, Shower chair with back  Bathroom Toilet: Standard     Bathroom Accessibility: Accessible  Home Equipment: Cane, quad (electric scooter)  Has the patient had two or more falls in the past year or any fall with injury in the past year?: Yes        Hand Dominance: right     EXAMINATION OF PERFORMANCE DEFICITS:    Cognitive/Behavioral Status:  Orientation  Overall Orientation Status: Within Functional Limits  Orientation Level: Oriented to person;Oriented to place  Cognition  Overall Cognitive Status: Exceptions  Following Commands: Follows multistep commands with increased time;Follows multistep commands with repitition  Attention Span: Appears intact  Safety Judgement: Decreased awareness of need for safety  Insights: Decreased awareness of deficits  Initiation: Does not require cues  Sequencing: Does not require cues    Hearing:    WFL    Vision/Perceptual:    Vision - Basic Assessment  Prior Vision: No visual deficits        Perception  Overall Perceptual Status: WFL    Range of Motion:   AROM: Generally decreased, functional         Strength:  Strength: Generally decreased, functional      Coordination:  Coordination: Within functional limits     Coordination: Within functional limits      Tone & Sensation:   Tone: Normal       Functional Mobility and Transfers for ADLs:    Bed Mobility:     Bed Mobility Training  Bed Mobility Training: Yes  Interventions: Verbal cues;Tactile cues;Manual cues  Rolling: Stand-by assistance  Supine to Sit: Contact-guard assistance;Minimum assistance;Assist X2  Sit to Supine: Maximum assistance;Assist X2  Scooting: Contact-guard assistance    Transfers:     Transfer Training  Transfer Training: Yes  Interventions: Verbal cues;Tactile cues;Safety awareness training  Sit to Stand: Contact-guard assistance  Stand to Sit: Contact-guard assistance  Bed to Chair: Minimum assistance;Adaptive equipment;Assist X2

## 2024-04-18 NOTE — PROGRESS NOTES
Hospitalist Progress Note    NAME: Almaz Goyal   : 1950   MRN: [id number]     Patient PCP: Merline Flores APRN - NP    Assessment / Plan:     ?  Septic shock versus cardiogenic shock  Patient systolic blood pressure dropped down into the 70s, albumin ordered, repeat labs  Antibiotics broadened to include vancomycin and Flagyl in addition to Levaquin  Started on midodrine  Intensivist evaluated the patient, was giving additional IV fluid boluses  Lactic acid around 2.3  After extensive discussion between intensivist and family, patient decided to switch her CODE STATUS to DNR and plan was made not to escalate to ICU per intensivist note.  Therefore patient remained in the stepdown unit  Patient started on Levophed, also was evaluated by cardiology who started her on inotrope agent  Patient is high risk of further deterioration  Palliative care consulted    , blood pressure stable overnight, continue midodrine.  -blood pressure borderline, plan to wean off milrinone.  Continue diuretics, changed to p.o.    Severe hypoglycemia with blood sugar in the 50s  Uncontrolled type I diabetes mellitus  Non anion gap metabolic acidosis   noted that the SQ needle was dislodged and so patient likely has not been getting her insulin the last couple of days PTA  S/p regular insulin 5 units in ED; lantus 10 units x 1   Patient was on insulin pump up to yesterday, discussed with diabetic management team, plan is to hold the insulin pump as patient is not able to manage  And continue with subcutaneous insulin.  Mealtime insulin held as blood sugar was in the 50s    , glucose controlled    Acute pulmonary edema  Hypoxemia  -noted on CT chest  -NTproBNP 1690   -s/p NS bolus in ED   Patient is currently on IV Bumex along with pressors  -ECHO showed EF of 35 to 40%  -needing 2LNC, t wean as able     JESSICA vs CKD  Non anion gap metabolic acidosis  -no recent baseline  -stopping IVF due to pulm  jaundice    Reviewed most current lab test results and cultures  YES  Reviewed most current radiology test results   YES  Review and summation of old records today    NO  Reviewed patient's current orders and MAR    YES  PMH/SH reviewed - no change compared to H&P    ________________________________________________________________________  Care Plan discussed with:    Comments   Patient x    Family  x    RN x    Care Manager     Consultant                        Multidiciplinary team rounds were held today with , nursing, pharmacist and clinical coordinator.  Patient's plan of care was discussed; medications were reviewed and discharge planning was addressed.     ________________________________________________________________________  Total NON critical care TIME:   Minutes    Total CRITICAL CARE TIME Spent:  60 Minutes non procedure based      Comments   >50% of visit spent in counseling and coordination of care x     This includes time during multidisciplinary rounds if indicated above   ________________________________________________________________________  Froylan Hoover MD     Procedures: see electronic medical records for all procedures/Xrays and details which were not copied into this note but were reviewed prior to creation of Plan.      LABS:  I reviewed today's most current labs and imaging studies.  Pertinent labs include:  Recent Labs     04/16/24  1335   WBC 11.7*   HGB 12.0   HCT 37.3          Recent Labs     04/16/24  0330 04/16/24  1335 04/17/24  0905   NA  --  140 139   K  --  3.8 3.9   CL  --  104 106   CO2  --  30 29   BUN  --  71* 65*   ALT  --  52  --    INR 1.1  --   --

## 2024-04-18 NOTE — PLAN OF CARE
Problem: Physical Therapy - Adult  Goal: By Discharge: Performs mobility at highest level of function for planned discharge setting.  See evaluation for individualized goals.  Description: FUNCTIONAL STATUS PRIOR TO ADMISSION: Patient was modified independent using a small base quad cane for functional mobility. and The patient  required stand-by assistance for basic and instrumental ADLs.    HOME SUPPORT PRIOR TO ADMISSION: The patient lived with spouse and required minimal assistance for bathing.     Physical Therapy Goals  Initiated 4/18/2024  1.  Patient will move from supine to sit and sit to supine, scoot up and down, and roll side to side in bed with supervision/set-up within 7 day(s).    2.  Patient will perform sit to stand with contact guard assist within 7 day(s).  3.  Patient will transfer from bed to chair and chair to bed with contact guard assist using the least restrictive device within 7 day(s).  4.  Patient will ambulate with contact guard assist for 25 feet with the least restrictive device within 7 day(s).   5.  Patient will perform 5 sit to stands in <40 seconds with contact guard assist within 7 day(s).   Outcome: Not Progressing   PHYSICAL THERAPY EVALUATION    Patient: Almaz Goyal (73 y.o. female)  Date: 4/18/2024  Primary Diagnosis: Hypomagnesemia [E83.42]  Hyperglycemia [R73.9]  Abnormal chest x-ray [R93.89]  JESSICA (acute kidney injury) (HCC) [N17.9]  Lactic acidosis due to diabetes mellitus (HCC) [E11.10]       Precautions: Restrictions/Precautions: Up as Tolerated, Fall Risk, Bed Alarm, Other (comment) (orthostatic hypotension)  Required Braces or Orthoses?: No Required Braces or Orthoses?: No                    ASSESSMENT :   DEFICITS/IMPAIRMENTS:   The patient is limited by decreased functional mobility, independence in ADLs, high-level IADLs, strength, activity tolerance, endurance, safety awareness, cognition, attention/concentration, balance, orthostatic hypotension following  Scores Predict Discharge Destination After Acute Care Hospitalization in Select Patient Groups: A Retrospective, Observational Study. Arch Rehabil Res Clin Transl. 2022;4(3):156038. doi: 10.1016/j.arrct..178103. PMID: 35509401; PMCID: ZJV3620123.  4. Susan Weir S, Bing W, Coreen BERMUDEZ. AM-PAC Short Forms Manual 4.0. Revised 2020.                                                                                                                                                                                                                              Pain Ratin/10   Pain Intervention(s):        Activity Tolerance:   Poor, requires frequent rest breaks, and signs and symptoms of orthostatic hypotension    After treatment:   Patient left in no apparent distress in bed, Call bell within reach, Bed/ chair alarm activated, Caregiver / family present, Side rails x3, and Heels elevated for pressure relief    COMMUNICATION/EDUCATION:   The patient's plan of care was discussed with: occupational therapist, registered nurse, and physician    Patient Education  Education Given To: Patient;Family  Education Provided: Role of Therapy;Plan of Care;Precautions;Transfer Training;Fall Prevention Strategies;Equipment;Family Education  Education Provided Comments: using RW for transfers and gait  Education Method: Demonstration;Verbal;Teach Back  Barriers to Learning: Cognition  Education Outcome: Unable to verbalize;Unable to demonstrate understanding;Continued education needed    Thank you for this referral.  Tee Curry, PT  Minutes: 41      Physical Therapy Evaluation Charge Determination   History Examination Presentation Decision-Making   HIGH Complexity :3+ comorbidities / personal factors will impact the outcome/ POC  HIGH Complexity : 4+ Standardized tests and measures addressing body structure, function, activity limitation and / or participation in recreation  HIGH Complexity : Unstable and unpredictable

## 2024-04-18 NOTE — PROGRESS NOTES
End of Shift Note    Bedside shift change report given to AYLA Miller (oncoming nurse) by Lesa Cross RN (offgoing nurse).  Report included the following information SBAR, Intake/Output, MAR, and Recent Results    Shift worked:  3957-9034     Shift summary and any significant changes:     Milrinone gtt decreased to 0.0625 mcg/kg/min. Severely orthostatic with PT/OT today. Bumex switched to 20mg oral Torsemide. Started on 62.5mcg daily of digoxin, hopefully will help with soft BP as it is a positive inotrope. Palliative probably coming back to discuss options with patient and family tomorrow. Poor PO intake.      Concerns for physician to address:       Zone phone for oncoming shift:          Activity:     Number times ambulated in hallways past shift: 0  Number of times OOB to chair past shift: 0    Cardiac:   Cardiac Monitoring: Yes           Access:  Current line(s): PIV     Genitourinary:   Urinary status: incontinent    Respiratory:      Chronic home O2 use?: NO  Incentive spirometer at bedside: N/A       GI:     Current diet:  ADULT DIET; Regular; 5 carb choices (75 gm/meal)  DIET ONE TIME MESSAGE;  Passing flatus: YES  Tolerating current diet: NO       Pain Management:   Patient states pain is manageable on current regimen: N/A    Skin:     Interventions: float heels, increase time out of bed, PT/OT consult, and nutritional support    Patient Safety:  Fall Score:    Interventions: bed/chair alarm, assistive device (walker, cane. etc), gripper socks, pt to call before getting OOB, and stay with me (per policy)       Length of Stay:  Expected LOS: 6  Actual LOS: 5      Lesa Cross RN

## 2024-04-18 NOTE — PLAN OF CARE
Orders received, chart reviewed and patient evaluated by physical therapy. Pending progression with skilled acute physical therapy, recommend:  Therapy 2x a week in the home, however, may require supervision, cognitive and/or physical assistance due to the following concerns listed below:   The patient is only wanting to return home and not go to rehab. Will likely need a wheelchair, BSC and RW.     Recommend with nursing OOB to chair 2x/day  with 2 person assist and rolling walker. Thank you for completing as able in order to maintain patient strength, endurance and independence.     Full evaluation to follow.      Tee Curry, PT

## 2024-04-18 NOTE — PROGRESS NOTES
This RN was present for all medication administrations completed by Heather Negro, Student Nurse on 4/18/24 from [id number].

## 2024-04-19 VITALS
OXYGEN SATURATION: 92 % | HEART RATE: 91 BPM | HEIGHT: 65 IN | SYSTOLIC BLOOD PRESSURE: 96 MMHG | WEIGHT: 126.32 LBS | RESPIRATION RATE: 18 BRPM | TEMPERATURE: 97.4 F | BODY MASS INDEX: 21.05 KG/M2 | DIASTOLIC BLOOD PRESSURE: 58 MMHG

## 2024-04-19 LAB
ANION GAP SERPL CALC-SCNC: 6 MMOL/L (ref 5–15)
BACTERIA SPEC CULT: NORMAL
BACTERIA SPEC CULT: NORMAL
BUN SERPL-MCNC: 69 MG/DL (ref 6–20)
BUN/CREAT SERPL: 33 (ref 12–20)
CALCIUM SERPL-MCNC: 9 MG/DL (ref 8.5–10.1)
CHLORIDE SERPL-SCNC: 106 MMOL/L (ref 97–108)
CO2 SERPL-SCNC: 28 MMOL/L (ref 21–32)
CREAT SERPL-MCNC: 2.1 MG/DL (ref 0.55–1.02)
GLUCOSE BLD STRIP.AUTO-MCNC: 245 MG/DL (ref 65–117)
GLUCOSE BLD STRIP.AUTO-MCNC: 247 MG/DL (ref 65–117)
GLUCOSE SERPL-MCNC: 262 MG/DL (ref 65–100)
POTASSIUM SERPL-SCNC: 3.6 MMOL/L (ref 3.5–5.1)
SERVICE CMNT-IMP: ABNORMAL
SERVICE CMNT-IMP: ABNORMAL
SERVICE CMNT-IMP: NORMAL
SERVICE CMNT-IMP: NORMAL
SODIUM SERPL-SCNC: 140 MMOL/L (ref 136–145)
VANCOMYCIN SERPL-MCNC: 13.8 UG/ML

## 2024-04-19 PROCEDURE — 97530 THERAPEUTIC ACTIVITIES: CPT

## 2024-04-19 PROCEDURE — 6370000000 HC RX 637 (ALT 250 FOR IP): Performed by: STUDENT IN AN ORGANIZED HEALTH CARE EDUCATION/TRAINING PROGRAM

## 2024-04-19 PROCEDURE — 36415 COLL VENOUS BLD VENIPUNCTURE: CPT

## 2024-04-19 PROCEDURE — 97535 SELF CARE MNGMENT TRAINING: CPT

## 2024-04-19 PROCEDURE — 2580000003 HC RX 258: Performed by: INTERNAL MEDICINE

## 2024-04-19 PROCEDURE — 80202 ASSAY OF VANCOMYCIN: CPT

## 2024-04-19 PROCEDURE — 6370000000 HC RX 637 (ALT 250 FOR IP)

## 2024-04-19 PROCEDURE — 82962 GLUCOSE BLOOD TEST: CPT

## 2024-04-19 PROCEDURE — 6370000000 HC RX 637 (ALT 250 FOR IP): Performed by: INTERNAL MEDICINE

## 2024-04-19 PROCEDURE — 99231 SBSQ HOSP IP/OBS SF/LOW 25: CPT

## 2024-04-19 PROCEDURE — 6360000002 HC RX W HCPCS: Performed by: INTERNAL MEDICINE

## 2024-04-19 PROCEDURE — 80048 BASIC METABOLIC PNL TOTAL CA: CPT

## 2024-04-19 RX ORDER — MIDODRINE HYDROCHLORIDE 5 MG/1
5 TABLET ORAL
Qty: 90 TABLET | Refills: 3 | Status: SHIPPED | OUTPATIENT
Start: 2024-04-19

## 2024-04-19 RX ORDER — DIGOXIN 0.06 MG/1
62.5 TABLET ORAL DAILY
Qty: 30 TABLET | Refills: 3 | Status: SHIPPED | OUTPATIENT
Start: 2024-04-20

## 2024-04-19 RX ORDER — TORSEMIDE 20 MG/1
20 TABLET ORAL DAILY
Qty: 30 TABLET | Refills: 3 | Status: SHIPPED | OUTPATIENT
Start: 2024-04-20

## 2024-04-19 RX ADMIN — SODIUM CHLORIDE, PRESERVATIVE FREE 10 ML: 5 INJECTION INTRAVENOUS at 08:34

## 2024-04-19 RX ADMIN — TORSEMIDE 20 MG: 20 TABLET ORAL at 08:33

## 2024-04-19 RX ADMIN — INSULIN LISPRO 3 UNITS: 100 INJECTION, SOLUTION INTRAVENOUS; SUBCUTANEOUS at 11:48

## 2024-04-19 RX ADMIN — ATORVASTATIN CALCIUM 20 MG: 20 TABLET, FILM COATED ORAL at 08:31

## 2024-04-19 RX ADMIN — LEVOFLOXACIN 750 MG: 750 TABLET, FILM COATED ORAL at 08:48

## 2024-04-19 RX ADMIN — DIGOXIN 62.5 MCG: 125 TABLET ORAL at 08:29

## 2024-04-19 RX ADMIN — MIDODRINE HYDROCHLORIDE 5 MG: 5 TABLET ORAL at 08:33

## 2024-04-19 RX ADMIN — INSULIN LISPRO 3 UNITS: 100 INJECTION, SOLUTION INTRAVENOUS; SUBCUTANEOUS at 08:48

## 2024-04-19 RX ADMIN — Medication 2000 UNITS: at 08:29

## 2024-04-19 RX ADMIN — ENOXAPARIN SODIUM 30 MG: 100 INJECTION SUBCUTANEOUS at 08:29

## 2024-04-19 RX ADMIN — LEVOTHYROXINE SODIUM 50 MCG: 0.05 TABLET ORAL at 08:30

## 2024-04-19 RX ADMIN — CLOPIDOGREL BISULFATE 75 MG: 75 TABLET ORAL at 08:31

## 2024-04-19 RX ADMIN — INSULIN LISPRO 2 UNITS: 100 INJECTION, SOLUTION INTRAVENOUS; SUBCUTANEOUS at 11:48

## 2024-04-19 RX ADMIN — INSULIN HUMAN 7 UNITS: 100 INJECTION, SUSPENSION SUBCUTANEOUS at 08:48

## 2024-04-19 RX ADMIN — MIDODRINE HYDROCHLORIDE 5 MG: 5 TABLET ORAL at 11:47

## 2024-04-19 RX ADMIN — INSULIN LISPRO 2 UNITS: 100 INJECTION, SOLUTION INTRAVENOUS; SUBCUTANEOUS at 08:49

## 2024-04-19 RX ADMIN — METRONIDAZOLE 500 MG: 500 INJECTION, SOLUTION INTRAVENOUS at 05:56

## 2024-04-19 NOTE — PLAN OF CARE
Problem: Physical Therapy - Adult  Goal: By Discharge: Performs mobility at highest level of function for planned discharge setting.  See evaluation for individualized goals.  Description: FUNCTIONAL STATUS PRIOR TO ADMISSION: Patient was modified independent using a small base quad cane for functional mobility. and The patient  required stand-by assistance for basic and instrumental ADLs.    HOME SUPPORT PRIOR TO ADMISSION: The patient lived with spouse and required minimal assistance for bathing.     Physical Therapy Goals  Initiated 4/18/2024  1.  Patient will move from supine to sit and sit to supine, scoot up and down, and roll side to side in bed with supervision/set-up within 7 day(s).    2.  Patient will perform sit to stand with contact guard assist within 7 day(s).  3.  Patient will transfer from bed to chair and chair to bed with contact guard assist using the least restrictive device within 7 day(s).  4.  Patient will ambulate with contact guard assist for 25 feet with the least restrictive device within 7 day(s).   5.  Patient will perform 5 sit to stands in <40 seconds with contact guard assist within 7 day(s).   Outcome: Progressing   PHYSICAL THERAPY TREATMENT    Patient: Almaz Goyal (73 y.o. female)  Date: 4/19/2024  Diagnosis: Hypomagnesemia [E83.42]  Hyperglycemia [R73.9]  Abnormal chest x-ray [R93.89]  JESSICA (acute kidney injury) (HCC) [N17.9]  Lactic acidosis due to diabetes mellitus (HCC) [E11.10] JESSICA (acute kidney injury) (HCC)      Precautions: Up as Tolerated, Fall Risk, Bed Alarm, Other (comment) (orthostatic hypotension)                      ASSESSMENT:  Patient continues to benefit from skilled PT services and is slowly progressing towards goals. Patient lying in bed when PT arrived.          PLAN:  Patient continues to benefit from skilled intervention to address the above impairments.  Continue treatment per established plan of care.    Recommend with staff: ***    Recommend next PT

## 2024-04-19 NOTE — PROGRESS NOTES
PCP hospital follow-up transitional care appointment has been scheduled with Dr. Flores on 4/29/24 at 1300. Pending patient discharge. Ilinaa Trinh, Care Management Assistant

## 2024-04-19 NOTE — DISCHARGE SUMMARY
Discharge Summary    Name: Almaz Goyal  [id number]  YOB: 1950 (Age: 73 y.o.)   Date of Admission: 4/13/2024  Date of Discharge: 4/19/2024  Attending Physician: Froylan Hoover MD    Discharge Diagnosis:   Cardiorenal syndrome  Hypotension  CHF  Acute pulmonary edema  Hypoxemia  CKD stage III  Pneumonia  CAD,   PAF  Essential hypertension  Mixed hyperlipidemia    PAD (peripheral artery disease) ([facility])    Consultations:  IP CONSULT TO HOSPITALIST  IP CONSULT TO CARDIOLOGY  IP CONSULT TO DIABETES MANAGEMENT  IP CONSULT TO PHARMACY  IP CONSULT TO PALLIATIVE CARE  IP CONSULT HOME HEALTH      Brief Admission History/Reason for Admission Per Antonia Goodrich MD:   Almaz Goyal is a 73 y.o.  female with PMHx significant for type I diabetes mellitus, CKD, hypertension, prior TIA presented to ED with c/o elevated blood sugar. They felt that insulin pump was not working and blood sugar was elevated all day. Family gave 13 units of insulin before coming to the hospital. At the time of my encounter,  states that he realized now that insulin pump was working, however, pump infusion spot was loose- and patient was not getting insulin. He is going to bring another device early in the morning tomorrow morning.   Patient had 2 episode of vomiting today.    Brief Hospital Course by Main Problems:   ?  Septic shock versus cardiogenic shock  Patient systolic blood pressure dropped down into the 70s, albumin ordered, repeat labs  Antibiotics broadened to include vancomycin and Flagyl in addition to Levaquin  Started on midodrine  Intensivist evaluated the patient, was giving additional IV fluid boluses  Lactic acid around 2.3  After extensive discussion between intensivist and family, patient decided to switch her CODE STATUS to DNR and plan was made not to escalate to ICU per intensivist note.  Therefore patient remained in the stepdown unit  Patient started on Levophed, also was          * This list has 5 medication(s) that are the same as other medications prescribed for you. Read the directions carefully, and ask your doctor or other care provider to review them with you.                      DISPOSITION:    Home with Family:       Home with HH/PT/OT/RN:    SNF/LTC:    YESSI:    OTHER:            Code status:   Recommended diet: {diet:91648}  Recommended activity: {discharge activity:56941}  Wound care: {WOUND CARE:69515109}      Follow up with:   PCP : Merline Flores APRN - NP    No follow-up provider specified.        Total time in minutes spent coordinating this discharge (includes going over instructions, follow-up, prescriptions, and preparing report for sign off to her PCP) :  35 minutes

## 2024-04-19 NOTE — PLAN OF CARE
Problem: Physical Therapy - Adult  Goal: By Discharge: Performs mobility at highest level of function for planned discharge setting.  See evaluation for individualized goals.  Description: FUNCTIONAL STATUS PRIOR TO ADMISSION: Patient was modified independent using a small base quad cane for functional mobility. and The patient  required stand-by assistance for basic and instrumental ADLs.    HOME SUPPORT PRIOR TO ADMISSION: The patient lived with spouse and required minimal assistance for bathing.     Physical Therapy Goals  Initiated 4/18/2024  1.  Patient will move from supine to sit and sit to supine, scoot up and down, and roll side to side in bed with supervision/set-up within 7 day(s).    2.  Patient will perform sit to stand with contact guard assist within 7 day(s).  3.  Patient will transfer from bed to chair and chair to bed with contact guard assist using the least restrictive device within 7 day(s).  4.  Patient will ambulate with contact guard assist for 25 feet with the least restrictive device within 7 day(s).   5.  Patient will perform 5 sit to stands in <40 seconds with contact guard assist within 7 day(s).   4/18/2024 1808 by Tee Curry, PT  Outcome: Not Progressing     Problem: Physical Therapy - Adult  Goal: By Discharge: Performs mobility at highest level of function for planned discharge setting.  See evaluation for individualized goals.  Description: FUNCTIONAL STATUS PRIOR TO ADMISSION: Patient was modified independent using a small base quad cane for functional mobility. and The patient  required stand-by assistance for basic and instrumental ADLs.    HOME SUPPORT PRIOR TO ADMISSION: The patient lived with spouse and required minimal assistance for bathing.     Physical Therapy Goals  Initiated 4/18/2024  1.  Patient will move from supine to sit and sit to supine, scoot up and down, and roll side to side in bed with supervision/set-up within 7 day(s).    2.  Patient will perform sit to  stand with contact guard assist within 7 day(s).  3.  Patient will transfer from bed to chair and chair to bed with contact guard assist using the least restrictive device within 7 day(s).  4.  Patient will ambulate with contact guard assist for 25 feet with the least restrictive device within 7 day(s).   5.  Patient will perform 5 sit to stands in <40 seconds with contact guard assist within 7 day(s).   4/18/2024 1808 by Tee Curry, PT  Outcome: Not Progressing

## 2024-04-19 NOTE — DISCHARGE INSTRUCTIONS
HOSPITALIST DISCHARGE INSTRUCTIONS    NAME: Almaz Goyal   :  1950   MRN:  [id number]     Date/Time:  2024 12:34 PM    ADMIT DATE: 2024   DISCHARGE DATE: 2024     Cardiorenal syndrome  Hypotension  CHF  Acute pulmonary edema  Hypoxemia  CKD stage III  Pneumonia  CAD,   PAF  Essential hypertension  Mixed hyperlipidemia    PAD (peripheral artery disease) (HCC)    It is important that you take the medication exactly as they are prescribed.   Keep your medication in the bottles provided by the pharmacist and keep a list of the medication names, dosages, and times to be taken in your wallet.   Do not take other medications without consulting your doctor.       What to do at Home    Recommended diet:  cardiac diet    Recommended activity: activity as tolerated      If you have questions regarding the hospital related prescriptions or hospital related issues please call [facility]' office at . You can always direct your questions to your primary care doctor if you are unable to reach your hospital physician; your PCP works as an extension of your hospital doctor just like your hospital doctor is an extension of your PCP for your time at the hospital ([facility])    If you experience any of the following symptoms then please call your primary care physician or return to the emergency room if you cannot get hold of your doctor:    Fever, chills, nausea, vomiting, or persistent diarrhea  Worsening weakness or new problems with your speech or balance  Dark stools or visible blood in your stools  New Leg swelling or shortness of breath as these could be signs of a clot    Additional Instructions:      Bring these papers with you to your follow up appointments. The papers will help your doctors be sure to continue the care plan from the hospital.              Information obtained by :  I understand that if any problems occur once I am at home I am to contact my physician.    I

## 2024-04-19 NOTE — CARE COORDINATION
04/19/24 1405   Discharge Planning   Type of Residence House   Living Arrangements Spouse/Significant Other   Potential Assistance Needed Home Care   Patient expects to be discharged to: House   Services At/After Discharge   [place] Resource Information Provided? No   Mode of Transport at Discharge Other (see comment)   Confirm Follow Up Transport Family   Condition of Participation: Discharge Planning   The Plan for Transition of Care is related to the following treatment goals: PCP and specialist     Patient is able to sit in chair comfortably without sob. She was asymptomatic with orthostatic bp as per nurse. Family is going to take her home in their vehicle. She has HH set up to start Monday.  She does not need 02 to go home with. Patient looking forward to going home.    English Nimo AGUILA CM   [phone]

## 2024-04-19 NOTE — PLAN OF CARE
Problem: Physical Therapy - Adult  Goal: By Discharge: Performs mobility at highest level of function for planned discharge setting.  See evaluation for individualized goals.  Description: FUNCTIONAL STATUS PRIOR TO ADMISSION: Patient was modified independent using a small base quad cane for functional mobility. and The patient  required stand-by assistance for basic and instrumental ADLs.    HOME SUPPORT PRIOR TO ADMISSION: The patient lived with spouse and required minimal assistance for bathing.     Physical Therapy Goals  Initiated 4/18/2024  1.  Patient will move from supine to sit and sit to supine, scoot up and down, and roll side to side in bed with supervision/set-up within 7 day(s).    2.  Patient will perform sit to stand with contact guard assist within 7 day(s).  3.  Patient will transfer from bed to chair and chair to bed with contact guard assist using the least restrictive device within 7 day(s).  4.  Patient will ambulate with contact guard assist for 25 feet with the least restrictive device within 7 day(s).   5.  Patient will perform 5 sit to stands in <40 seconds with contact guard assist within 7 day(s).   4/19/2024 1520 by Tee Curry, PT  Outcome: Progressing  PHYSICAL THERAPY TREATMENT    Patient: Almaz Goyal (73 y.o. female)  Date: 4/19/2024  Diagnosis: Hypomagnesemia [E83.42]  Hyperglycemia [R73.9]  Abnormal chest x-ray [R93.89]  JESSICA (acute kidney injury) (HCC) [N17.9]  Lactic acidosis due to diabetes mellitus (HCC) [E11.10] JESSICA (acute kidney injury) (HCC)      Precautions: Up as Tolerated, Fall Risk, Bed Alarm, Other (comment) (orthostatic hypotension)                      ASSESSMENT:  Patient continues to benefit from skilled PT services and is slowly progressing towards goals. The patient was lying in bed when PT arrived with her spouse and other family members present. She came to sitting with min/mod a with good/fair sitting balance. Stood with min a and transferred bed to chair

## 2024-04-19 NOTE — DIABETES MGMT
[facility]  DIABETES MANAGEMENT CONSULT    Consulted by Provider for advanced nursing evaluation and care for inpatient blood glucose management.    Evaluation and Action Plan   Almaz Goyal is a 73 year old female patient with usually well controlled diabetes. The patient uses an insulin ump ( Tandem), however her  is the one that operates and changes the set for her. The patient does not seem knowledgeable about insulin pump use. Today the patient is weak and does not feel well. The patient's  is not at the bedside. The patient;s BG's reading high and insulin pump alarming. The patient is unaware how to bolus on pump. I recommend the patient not use pump while hospitalized and veto resume at discharge. The patient must be able to use and operate insulin pump or her  must remain at the bedside to dos so in order to keep pump use while hospitalized. Neither of those things are in place. The patient is agreeable to removing pump. Insulin injections  basal/bolus will be uses instead.   FBG was stable this morning This afternoon the patient has a low BG. She has scheduled meal time , however I suspect she did not eat much. I will hold meal time insulin for now, until the patient is consuming greater than 50 % of meals.   Update: Spoke with the patient and patient's spouse on the policy regarding insulin pump. Since the patient is unable to operate on her own and the patient's  will not be staying with the patient through the night, it has been agreed that the insulin pump will remain off until discharge. The patient will be given basal/bolus insulin injections. The patient will require basal insulin since she has Type 1 diabetes ( dosing may require reductions at times).     BG's stable on the current dosing. The patient is still eating very little today. I will continue to hold the scheduled meal time insulin for now.     Bg's have been elevated overnight and  Serve

## 2024-04-19 NOTE — PROGRESS NOTES
Physician Progress Note      PATIENT:               CATALINA MA  CSN #:                  [id number]  :                       1950  ADMIT DATE:       2024 6:42 PM  DISCH DATE:  RESPONDING  PROVIDER #:        Froylan Hoover MD          QUERY TEXT:    Dr Hoover  Pt admitted with DM. Pt noted to have acute pulmonary edema with Cardiac   consult; \", we would be available for further management of heart failure\". If   possible, please document in the progress notes and discharge summary if you   are evaluating and/or treating any of the following:    The medical record reflects the following:  Risk Factors: JESSICA, PAD  Clinical Indicators: Cardiac consult notes on 4/15: \"we would be available for   further management of heart failure.\" with acute pulmonary edema on CT of   chest, with proBNp 1690; Echo showed EF of 35 to 40%  Treatment: Bumex IV, oxygen as needed,  Options provided:  -- Acute pulmonary edema due to heart disease  -- Acute pulmonary edema due to heart failure  -- Chronic pulmonary edema due to heart disease  -- Chronic pulmonary edema due to heart failure  -- Noncardiogenic acute pulmonary edema due to IVF  -- Other - I will add my own diagnosis  -- Disagree - Not applicable / Not valid  -- Disagree - Clinically unable to determine / Unknown  -- Refer to Clinical Documentation Reviewer    PROVIDER RESPONSE TEXT:    This patient has acute pulmonary edema due to heart disease.    Query created by: Idalmis Khan on 2024 2:42 PM      Electronically signed by:  Froylan Hoover MD 2024 9:55 PM

## 2024-04-19 NOTE — CONSULTS
Palliative Medicine  Patient Name: Almaz Goyal  YOB: 1950  MRN: [id number]  Age: 73 y.o.  Gender: female    Patient is working with cards and the attending to create a d/c plan with home health  As long as she is open to this, I will not re-approach hospice    DDNR signed and her  has it    Will sign off    Please re-consult if we are needed    MODESTO Otoole - NP

## 2024-04-19 NOTE — PROGRESS NOTES
[facility]  [address] [place] [address]  [phone]  [email or web address]  CARDIOLOGY PROGRESS NOTE    4/19/2024 1:39 PM    Admit Date: 4/13/2024    Admit Diagnosis:   Hypomagnesemia [E83.42]  Hyperglycemia [R73.9]  Abnormal chest x-ray [R93.89]  JESSICA (acute kidney injury) (HCC) [N17.9]  Lactic acidosis due to diabetes mellitus (HCC) [E11.10]    Subjective:     Almaz Goyal was seen and examined at the bedside.  No complaints.  Would like to go home.    BP (!) 96/58   Pulse 91   Temp 97.4 °F (36.3 °C) (Oral)   Resp 18   Ht 1.651 m (5' 5\")   Wt 57.3 kg (126 lb 5.2 oz)   SpO2 92%   BMI 21.02 kg/m²     Current Facility-Administered Medications   Medication Dose Route Frequency    torsemide (DEMADEX) tablet 20 mg  20 mg Oral Daily    digoxin (LANOXIN) tablet 62.5 mcg  62.5 mcg Oral Daily    insulin NPH (HumuLIN N;NovoLIN N) injection vial 7 Units  7 Units SubCUTAneous BID    ipratropium 0.5 mg-albuterol 2.5 mg (DUONEB) nebulizer solution 1 Dose  1 Dose Inhalation Q4H PRN    midodrine (PROAMATINE) tablet 5 mg  5 mg Oral TID WC    metroNIDAZOLE (FLAGYL) 500 mg in 0.9% NaCl 100 mL IVPB premix  500 mg IntraVENous Q8H    glucose chewable tablet 16 g  4 tablet Oral PRN    dextrose bolus 10% 125 mL  125 mL IntraVENous PRN    Or    dextrose bolus 10% 250 mL  250 mL IntraVENous PRN    glucagon injection 1 mg  1 mg SubCUTAneous PRN    dextrose 10 % infusion   IntraVENous Continuous PRN    insulin lispro (HUMALOG) injection vial 3 Units  0.05 Units/kg SubCUTAneous TID WC    insulin lispro (HUMALOG) injection vial 10 Units  10 Units SubCUTAneous Once    insulin lispro (HUMALOG) injection vial 0-8 Units  0-8 Units SubCUTAneous TID WC    insulin lispro (HUMALOG) injection vial 0-4 Units  0-4 Units SubCUTAneous Nightly    [Held by provider] bumetanide (BUMEX) injection 1 mg  1 mg IntraVENous BID    sodium chloride flush 0.9 % injection 5-40 mL  5-40 mL IntraVENous 2

## 2024-04-19 NOTE — PROGRESS NOTES
04/19/24 1230 04/19/24 1245 04/19/24 1246   Vital Signs   Pulse 88 88 (!) 108   Respirations  --  18  --    BP (!) 101/57 (!) 101/57 (!) 75/51   MAP (Calculated) 72 72 59   MAP (mmHg)  --  66  --    BP Location Left upper arm  --   --    BP Method Automatic  --   --    Patient Position Supine  --  Sitting   Oxygen Therapy   SpO2 100 % 100 % 95 %   Pulse via Oximetry  --  85 beats per minute  --    O2 Device None (Room air)  --   --       04/19/24 1248 04/19/24 1252 04/19/24 1253   Vital Signs   Pulse (!) 103 (!) 107 (!) 102   Respirations  --   --   --    BP (!) 94/49 (!) 80/37 (!) 90/46   MAP (Calculated) 64 51 61   MAP (mmHg)  --   --   --    BP Location  --   --   --    BP Method  --   --   --    Patient Position  --  Sitting  (post transfer bed to chair) Sitting  (after marching in [facility])   Oxygen Therapy   SpO2  --   --   --    Pulse via Oximetry  --   --   --    O2 Device  --   --   --       04/19/24 1259   Vital Signs   Pulse 91   Respirations  --    BP (!) 96/58   MAP (Calculated) 71   MAP (mmHg)  --    BP Location  --    BP Method  --    Patient Position Sitting  (legs elevated')   Oxygen Therapy   SpO2 92 %   Pulse via Oximetry  --    O2 Device None (Room air)       Patient was asymptomatic the entire session.    Tee Curry, PT

## 2024-04-19 NOTE — PLAN OF CARE
Problem: Occupational Therapy - Adult  Goal: By Discharge: Performs self-care activities at highest level of function for planned discharge setting.  See evaluation for individualized goals.  Description: FUNCTIONAL STATUS PRIOR TO ADMISSION:  Patient reports ambulating with a quad cane and being independent to mod I for ADLs using bathroom DME.  HOME SUPPORT: Patient was living with her .    Occupational Therapy Goals:  Initiated 4/18/2024  1.  Patient will perform grooming standing at sink with Supervision within 7 day(s).  2.  Patient will perform upper body dressing with Set-up and Supervision within 7 day(s).  3.  Patient will perform lower body dressing with Set-up and Supervision within 7 day(s).  4.  Patient will perform toilet transfers with Set-up and Supervision  within 7 day(s).  5.  Patient will perform all aspects of toileting with Set-up and Supervision within 7 day(s).  6.  Patient will sponge bathing with Set-up and Supervision within 7 day(s).     Outcome: Progressing     OCCUPATIONAL THERAPY TREATMENT  Patient: Almaz Goyal (73 y.o. female)  Date: 4/19/2024  Primary Diagnosis: Hypomagnesemia [E83.42]  Hyperglycemia [R73.9]  Abnormal chest x-ray [R93.89]  JESSICA (acute kidney injury) (HCC) [N17.9]  Lactic acidosis due to diabetes mellitus (HCC) [E11.10]       Precautions: Up as Tolerated, Fall Risk, Bed Alarm, Other (comment) (orthostatic hypotension)                Chart, occupational therapy assessment, plan of care, and goals were reviewed.    ASSESSMENT  Patient continues to benefit from skilled OT services and is progressing towards goals. Pt cleared for therapy by nursing and received supine in bed with family in room and on RA. Pt agreeable to therapy with encouragement, however, perseverating on going home throughout session. Overall pt Min A for functional mobility and transfer to chair. BP monitored with significant drop in BP with positional changes (see below). Pt asymptomatic.  SUMMARY:   Cognitive/Behavioral Status:     Cognition  Overall Cognitive Status: Exceptions  Following Commands: Follows multistep commands with increased time;Follows multistep commands with repitition  Attention Span: Appears intact  Safety Judgement: Decreased awareness of need for safety  Insights: Decreased awareness of deficits  Initiation: Does not require cues  Sequencing: Does not require cues    Functional Mobility and Transfers for ADLs:  Bed Mobility:  Bed Mobility Training  Bed Mobility Training: Yes  Interventions: Verbal cues;Tactile cues;Manual cues;Visual cues  Supine to Sit: Moderate assistance;Minimum assistance  Scooting: Contact-guard assistance     Transfers:   Transfer Training  Transfer Training: Yes  Interventions: Verbal cues;Tactile cues;Safety awareness training;Visual cues  Sit to Stand: Minimum assistance  Stand to Sit: Minimum assistance  Bed to Chair: Minimum assistance;Contact-guard assistance;Assist X2;Adaptive equipment (RW)         Balance:     Balance  Sitting: Impaired  Sitting - Static: Good (unsupported)  Sitting - Dynamic: Fair (occasional)  Standing - Static: Constant support;Good  Standing - Dynamic: Fair;Constant support    ADL Intervention:  LE Dressing: Moderate assistance     Pain Rating:  None   Pain Intervention(s):   pain is at a level acceptable to the patient      Activity Tolerance:   signs and symptoms of orthostatic hypotension  Please refer to the flowsheet for vital signs taken during this treatment.    After treatment:   Patient left in no apparent distress sitting up in chair, Call bell within reach, Bed/ chair alarm activated, and Caregiver / family present    COMMUNICATION/EDUCATION:   The patient's plan of care was discussed with: physical therapist and registered nurse         Thank you for this referral.  Virginia Vaughan OT  Minutes: 25

## 2024-04-19 NOTE — CARE COORDINATION
Transition of Care Plan:    RUR: 14%  Prior Level of Functioning: independant  Disposition: home with HH  If SNF or IPR: Date FOC offered: Patient not interested in SNF  Date FOC received:   Accepting facility:   Date authorization started with reference number:   Date authorization received and expires:   Follow up appointments: PCP and specialist  DME needed: has a walker  Transportation at discharge: TBA  IM/IMM Medicare/ letter given: sign at DC  Is patient a  and connected with [facility]?    If yes, was  transfer form completed and [facility] notified?   Caregiver Contact:     Richard Goyal (Spouse)  [phone]      Discharge Caregiver contacted prior to discharge?   Care Conference needed?   Barriers to discharge: clinical inprovement    Plan: home with HH and Milrinone  Delay: clinical improvement so that she is stable to go home  She is still on Milrinone and 02  Plan is to wean off of 02  She has Home Health set up for Skilled nursing, PT OT if she can tolerate it.  She has [employer] on board once they get the final order for the Milrinone if patient is still going home on it. Noted that patient was orthostatic with therapy yesterday    I talked with patient briefly yesterday and with  when he came to visit. Patient will be discussed in rounds today    Milrinone cannot be set up on the weekend    English Nimo AGUILA CM   [id number]

## 2024-04-21 LAB
BACTERIA SPEC CULT: NORMAL
SERVICE CMNT-IMP: NORMAL

## 2024-04-22 LAB
BACTERIA SPEC CULT: NORMAL
SERVICE CMNT-IMP: NORMAL

## 2024-04-23 NOTE — PROGRESS NOTES
Physician Progress Note      PATIENT:               CATALINA MA  [facility] #:                  [id number]  :                       1950  ADMIT DATE:       2024 6:42 PM  DISCH DATE:        2024 2:30 PM  RESPONDING  PROVIDER #:        Froylan Hoover MD          QUERY TEXT:    Dr Brasherel  Patient admitted with hypoglycemia. Noted documentation of DKA in Dr Ulloa on   . In order to support the diagnosis of DKA please include additional   clinical indicators in your documentation.  Or please document if the   diagnosis of DKA has been ruled out after further study.    The medical record reflects the following:  Risk Factors: DM type 1  Clinical Indicators: Insulin pump has not been working all day per family, HP   notes; uncontrolled DM1, with non-anion gap metabolic acidosis in HP  Treatment: 1 liter NS bolus, restarting insulin pump  Options provided:  -- DKA present as evidenced by, Please document evidence.  -- DKA was ruled out  -- Other - I will add my own diagnosis  -- Disagree - Not applicable / Not valid  -- Disagree - Clinically unable to determine / Unknown  -- Refer to Clinical Documentation Reviewer    PROVIDER RESPONSE TEXT:    DKA is present as evidenced by    Query created by: Idalmis Khan on 2024 1:29 PM      QUERY TEXT:    Dr Brasherel  Patient admitted with uncontrolled type 1 DM. Noted documentation of Pneumonia   in HP/PN. In order to support the diagnosis of pneumonia, please include   additional clinical indicators in your documentation.  Or please document if   the diagnosis of pneumonia has been ruled out after further study.    The medical record reflects the following:  Risk Factors: HTN, DM, PAF, CAD  Clinical Indicators: pneumonia noted in HP, with chest xray on  showing;   Interstitial edema favored over interstitial pneumonia. repeat chest xray on   ; showing: Suspect developing left lower airspace disease/pneumonia.   Persistent pleural effusions.  Treatment:  Zithromax, Bumex iv, Levaquin, Flagyl, Levophed gtt, 1ltr bolus NS,  Options provided:  -- pneumonia present as evidenced by, Please document evidence.  -- pneumonia was ruled out  -- Other - I will add my own diagnosis  -- Disagree - Not applicable / Not valid  -- Disagree - Clinically unable to determine / Unknown  -- Refer to Clinical Documentation Reviewer    PROVIDER RESPONSE TEXT:    pneumonia is present as evidenced by    Query created by: Idalmis Khan on 4/23/2024 1:43 PM      QUERY TEXT:    Dr Hoover  Patient admitted with DM hyperglycemia.  Noted documentation of septic shock   vs cardiogenic shock and asymptomatic hypotension by Dr Ulloa on 4/16  If possible, please document in progress notes and discharge summary if you   are evaluating and /or treating any of the following:    The medical record reflects the following:  Risk Factors: DM, HTN, CAD  Clinical Indicators: WBC 13-19, LA 4.7-2.7 with DM1, Dr Ulloa on 4/16;   \"patient is asymptomatic and there is no evidence of end organ damage then we   should not be strictly relying on a fixed number. When I arrived at the   bedside, patient had a MAP of 49 and had normal mentation\"  Treatment: Zithormax, Levaquin, 1ltr NS bolus, Lavished gtt.  Options provided:  -- Septic shock confirmed  -- Cardiogenic shock confirmed  -- Septic shock and Cardiogenic shock ruled out  -- Other - I will add my own diagnosis  -- Disagree - Not applicable / Not valid  -- Disagree - Clinically unable to determine / Unknown  -- Refer to Clinical Documentation Reviewer    PROVIDER RESPONSE TEXT:    After study, septic shock confirmed.    Query created by: Idalmis Khan on 4/23/2024 1:52 PM      Electronically signed by:  Froylan Hoover MD 4/23/2024 6:06 PM

## (undated) DEVICE — Device: Brand: QUICK-CROSS SUPPORT CATHETER

## (undated) DEVICE — TR BAND RADIAL ARTERY COMPRESSION DEVICE: Brand: TR BAND

## (undated) DEVICE — MINI TREK CORONARY DILATATION CATHETER 2.0 MM X 8 MM / RAPID-EXCHANGE: Brand: MINI TREK

## (undated) DEVICE — TORCON NB, ADVANTAGE CATHETER: Brand: TORCON NB

## (undated) DEVICE — TUBING PRSS MON L6IN PVC M FEM CONN

## (undated) DEVICE — KIT ANGIOGRAPHY CUST MRMC

## (undated) DEVICE — RADIFOCUS GLIDEWIRE ADVANTAGE GUIDEWIRE: Brand: GLIDEWIRE ADVANTAGE

## (undated) DEVICE — CATH GUID COR EB35 6FR 100CM -- LAUNCHER

## (undated) DEVICE — PINNACLE INTRODUCER SHEATH: Brand: PINNACLE

## (undated) DEVICE — CUSTOM KT PTCA INFL DEV K05 00053H

## (undated) DEVICE — CATHETER ETER ANGIO L110CM OD5FR ID046IN L75CM 038IN 145DEG CARD

## (undated) DEVICE — 3M™ TEGADERM™ TRANSPARENT FILM DRESSING FRAME STYLE, 1626W, 4 IN X 4-3/4 IN (10 CM X 12 CM), 50/CT 4CT/CASE: Brand: 3M™ TEGADERM™

## (undated) DEVICE — SYRINGE ANGIO 10 CC BRL STD PRNT POLYCARB LT BLU MEDALLION

## (undated) DEVICE — ANGIOGRAPHY KIT CUST [K0910930B] [MERIT MEDICAL SYSTEMS INC]

## (undated) DEVICE — SPLINT WR POS F/ARTERIAL ACC -- BX/10

## (undated) DEVICE — CATHETER ANGIO PIG 0.052 INX5 FRX65 CM PERFORMA

## (undated) DEVICE — GUIDEWIRE VASC L260CM 0.035IN J TIP L3MM PTFE FIX COR NAMIC

## (undated) DEVICE — TOWEL,OR,DSP,ST,BLUE,STD,2/PK,40PK/CS: Brand: MEDLINE

## (undated) DEVICE — SUTURE PERMAHAND SZ 2-0 L18IN NONABSORBABLE BLK L26MM PS 1588H

## (undated) DEVICE — GLIDESHEATH SLENDER ACCESS KIT: Brand: GLIDESHEATH SLENDER

## (undated) DEVICE — HI-TORQUE VERSACORE MODIFIED J GUIDE WIRE SYSTEM 260 CM: Brand: HI-TORQUE VERSACORE

## (undated) DEVICE — RUNTHROUGH NS EXTRA FLOPPY PTCA GUIDEWIRE: Brand: RUNTHROUGH

## (undated) DEVICE — R2P DESTINATION SLENDER GUIDING SHEATH: Brand: R2P DESTINATION SLENDER

## (undated) DEVICE — PRESSURE GUIDEWIRE: Brand: COMET

## (undated) DEVICE — KIT ACCS INTRO 4FR L10CM NDL 21GA L7CM GWIRE L40CM

## (undated) DEVICE — HI-TORQUE VERSACORE FLOPPY GUIDE WIRE SYSTEM 145 CM: Brand: HI-TORQUE VERSACORE

## (undated) DEVICE — PACK PROCEDURE SURG HRT CATH

## (undated) DEVICE — DRAPE PRB US TRNSDCR 6X96IN --

## (undated) DEVICE — BLADE ASSEMB CLP HAIR FINE --

## (undated) DEVICE — SYR POWER 150ML 8IN FILL TUBE --

## (undated) DEVICE — CATHETER ETER CARD MULTIPAK MULTIPAK 5FR PERFORMA

## (undated) DEVICE — Device: Brand: PROWATER

## (undated) DEVICE — DECANTER FLD L85IN IV FLX TBNG CLMP DLP

## (undated) DEVICE — GUIDEWIRE VASC L145CM 0.035IN J TIP L3MM PTFE FIX COR NAMIC

## (undated) DEVICE — INTRODUCER SHTH THN WALLED 6 FRX10 CM 22 GA ANGLED RAIN SHTH

## (undated) DEVICE — AIRLIFE™  ADULT CUSHION NASAL CANNULA WITH 7 FOOT (2.1 M) CRUSH-RESISTANT OXYGEN TUBING, AND U/CONNECT-IT ADAPTER: Brand: AIRLIFE™

## (undated) DEVICE — Device

## (undated) DEVICE — RADIFOCUS OPTITORQUE ANGIOGRAPHIC CATHETER: Brand: OPTITORQUE

## (undated) DEVICE — DRAPE OPHTH 4 PLY SGL FEN

## (undated) DEVICE — PRESSURE MONITORING SET: Brand: TRUWAVE